# Patient Record
Sex: MALE | Race: WHITE
[De-identification: names, ages, dates, MRNs, and addresses within clinical notes are randomized per-mention and may not be internally consistent; named-entity substitution may affect disease eponyms.]

---

## 2019-01-06 ENCOUNTER — HOSPITAL ENCOUNTER (EMERGENCY)
Dept: HOSPITAL 57 - BURERS | Age: 69
Discharge: ANOTHER HOSPITAL | End: 2019-01-06
Payer: COMMERCIAL

## 2019-01-06 DIAGNOSIS — I10: ICD-10-CM

## 2019-01-06 DIAGNOSIS — Z79.4: ICD-10-CM

## 2019-01-06 DIAGNOSIS — I25.2: ICD-10-CM

## 2019-01-06 DIAGNOSIS — A41.9: Primary | ICD-10-CM

## 2019-01-06 DIAGNOSIS — N19: ICD-10-CM

## 2019-01-06 DIAGNOSIS — Z79.82: ICD-10-CM

## 2019-01-06 DIAGNOSIS — Z79.899: ICD-10-CM

## 2019-01-06 DIAGNOSIS — E78.1: ICD-10-CM

## 2019-01-06 LAB
ALBUMIN SERPL BCG-MCNC: 3.6 G/DL (ref 3.4–4.8)
ALP SERPL-CCNC: 85 U/L (ref 40–150)
ALT SERPL W P-5'-P-CCNC: 36 U/L (ref 8–55)
ANION GAP SERPL CALC-SCNC: 29 MMOL/L (ref 10–20)
AST SERPL-CCNC: 44 U/L (ref 5–34)
BILIRUB SERPL-MCNC: 1.2 MG/DL (ref 0.2–1.2)
BUN SERPL-MCNC: 44 MG/DL (ref 8.4–25.7)
CALCIUM SERPL-MCNC: 9.8 MG/DL (ref 7.8–10.44)
CHLORIDE SERPL-SCNC: 84 MMOL/L (ref 98–107)
CK MB SERPL-MCNC: 2.2 NG/ML (ref 0–6.6)
CO2 SERPL-SCNC: 15 MMOL/L (ref 23–31)
CREAT CL PREDICTED SERPL C-G-VRATE: 0 ML/MIN (ref 70–130)
DOHLE BOD BLD QL SMEAR: SLIGHT
GLOBULIN SER CALC-MCNC: 3.8 G/DL (ref 2.4–3.5)
GLUCOSE SERPL-MCNC: 538 MG/DL (ref 80–115)
HGB BLD-MCNC: 15.1 G/DL (ref 14–18)
MCH RBC QN AUTO: 30.5 PG (ref 27–31)
MCV RBC AUTO: 90.6 FL (ref 78–98)
MDIFF COMPLETE?: YES
PLATELET # BLD AUTO: 114 THOU/UL (ref 130–400)
POTASSIUM SERPL-SCNC: 3.6 MMOL/L (ref 3.5–5.1)
RBC # BLD AUTO: 4.93 MILL/UL (ref 4.7–6.1)
SODIUM SERPL-SCNC: 124 MMOL/L (ref 136–145)
TOXIC GRANULES BLD QL SMEAR: SLIGHT
WBC # BLD AUTO: 12.5 THOU/UL (ref 4.8–10.8)

## 2019-01-06 PROCEDURE — 93005 ELECTROCARDIOGRAM TRACING: CPT

## 2019-01-06 PROCEDURE — 87070 CULTURE OTHR SPECIMN AEROBIC: CPT

## 2019-01-06 PROCEDURE — 87186 SC STD MICRODIL/AGAR DIL: CPT

## 2019-01-06 PROCEDURE — 87077 CULTURE AEROBIC IDENTIFY: CPT

## 2019-01-06 PROCEDURE — 36416 COLLJ CAPILLARY BLOOD SPEC: CPT

## 2019-01-06 PROCEDURE — 84484 ASSAY OF TROPONIN QUANT: CPT

## 2019-01-06 PROCEDURE — 96375 TX/PRO/DX INJ NEW DRUG ADDON: CPT

## 2019-01-06 PROCEDURE — 83605 ASSAY OF LACTIC ACID: CPT

## 2019-01-06 PROCEDURE — 87040 BLOOD CULTURE FOR BACTERIA: CPT

## 2019-01-06 PROCEDURE — 82553 CREATINE MB FRACTION: CPT

## 2019-01-06 PROCEDURE — 96374 THER/PROPH/DIAG INJ IV PUSH: CPT

## 2019-01-06 PROCEDURE — 87205 SMEAR GRAM STAIN: CPT

## 2019-01-06 PROCEDURE — 80053 COMPREHEN METABOLIC PANEL: CPT

## 2019-01-06 PROCEDURE — 87149 DNA/RNA DIRECT PROBE: CPT

## 2019-01-06 PROCEDURE — 10060 I&D ABSCESS SIMPLE/SINGLE: CPT

## 2019-01-06 PROCEDURE — 71045 X-RAY EXAM CHEST 1 VIEW: CPT

## 2019-01-06 PROCEDURE — 83880 ASSAY OF NATRIURETIC PEPTIDE: CPT

## 2019-01-06 PROCEDURE — 85025 COMPLETE CBC W/AUTO DIFF WBC: CPT

## 2019-01-06 NOTE — RAD
AP PORTABLE CHEST:

 

01/06/2019

 

2214 HOURS

 

COMPARISON:

 No prior films available for comparison.

 

FINDINGS:

The heart is mildly enlarged, but there is no congestive change or pleural effusion.  The lung are cl
ear.  The mediastinum shows no widening or shift.

 

IMPRESSION:

Slight cardiomegaly.

 

POS: HOME

## 2019-01-07 ENCOUNTER — HOSPITAL ENCOUNTER (INPATIENT)
Dept: HOSPITAL 92 - ERS | Age: 69
LOS: 25 days | Discharge: NURSING, REHAB OR LONG-TERM CARE | DRG: 3 | End: 2019-02-01
Attending: FAMILY MEDICINE | Admitting: FAMILY MEDICINE
Payer: COMMERCIAL

## 2019-01-07 VITALS — BODY MASS INDEX: 33.7 KG/M2

## 2019-01-07 DIAGNOSIS — E11.51: ICD-10-CM

## 2019-01-07 DIAGNOSIS — I47.1: ICD-10-CM

## 2019-01-07 DIAGNOSIS — E87.6: ICD-10-CM

## 2019-01-07 DIAGNOSIS — R65.21: ICD-10-CM

## 2019-01-07 DIAGNOSIS — J44.9: ICD-10-CM

## 2019-01-07 DIAGNOSIS — D64.9: ICD-10-CM

## 2019-01-07 DIAGNOSIS — I13.0: ICD-10-CM

## 2019-01-07 DIAGNOSIS — Z88.5: ICD-10-CM

## 2019-01-07 DIAGNOSIS — E66.01: ICD-10-CM

## 2019-01-07 DIAGNOSIS — Z79.899: ICD-10-CM

## 2019-01-07 DIAGNOSIS — Z95.5: ICD-10-CM

## 2019-01-07 DIAGNOSIS — I50.31: ICD-10-CM

## 2019-01-07 DIAGNOSIS — G92: ICD-10-CM

## 2019-01-07 DIAGNOSIS — I48.91: ICD-10-CM

## 2019-01-07 DIAGNOSIS — E11.10: ICD-10-CM

## 2019-01-07 DIAGNOSIS — L03.116: ICD-10-CM

## 2019-01-07 DIAGNOSIS — E78.5: ICD-10-CM

## 2019-01-07 DIAGNOSIS — J96.01: ICD-10-CM

## 2019-01-07 DIAGNOSIS — E87.5: ICD-10-CM

## 2019-01-07 DIAGNOSIS — A40.9: Primary | ICD-10-CM

## 2019-01-07 DIAGNOSIS — F17.210: ICD-10-CM

## 2019-01-07 DIAGNOSIS — E87.2: ICD-10-CM

## 2019-01-07 DIAGNOSIS — I25.2: ICD-10-CM

## 2019-01-07 DIAGNOSIS — E87.1: ICD-10-CM

## 2019-01-07 DIAGNOSIS — R53.81: ICD-10-CM

## 2019-01-07 DIAGNOSIS — N17.9: ICD-10-CM

## 2019-01-07 DIAGNOSIS — I25.10: ICD-10-CM

## 2019-01-07 DIAGNOSIS — N18.9: ICD-10-CM

## 2019-01-07 DIAGNOSIS — E11.22: ICD-10-CM

## 2019-01-07 DIAGNOSIS — L02.416: ICD-10-CM

## 2019-01-07 DIAGNOSIS — E27.40: ICD-10-CM

## 2019-01-07 DIAGNOSIS — N17.0: ICD-10-CM

## 2019-01-07 DIAGNOSIS — G47.33: ICD-10-CM

## 2019-01-07 LAB
ALBUMIN SERPL BCG-MCNC: 2.7 G/DL (ref 3.4–4.8)
ALP SERPL-CCNC: 66 U/L (ref 40–150)
ALT SERPL W P-5'-P-CCNC: 26 U/L (ref 8–55)
ANALYZER IN CARDIO: (no result)
ANION GAP SERPL CALC-SCNC: 17 MMOL/L (ref 10–20)
ANION GAP SERPL CALC-SCNC: 17 MMOL/L (ref 10–20)
ANION GAP SERPL CALC-SCNC: 21 MMOL/L (ref 10–20)
ANION GAP SERPL CALC-SCNC: 22 MMOL/L (ref 10–20)
ANION GAP SERPL CALC-SCNC: 23 MMOL/L (ref 10–20)
AST SERPL-CCNC: 37 U/L (ref 5–34)
BASE EXCESS STD BLDA CALC-SCNC: -11.5 MEQ/L
BILIRUB SERPL-MCNC: 0.8 MG/DL (ref 0.2–1.2)
BUN SERPL-MCNC: 45 MG/DL (ref 8.4–25.7)
BUN SERPL-MCNC: 46 MG/DL (ref 8.4–25.7)
BUN SERPL-MCNC: 47 MG/DL (ref 8.4–25.7)
BUN SERPL-MCNC: 47 MG/DL (ref 8.4–25.7)
BUN SERPL-MCNC: 50 MG/DL (ref 8.4–25.7)
CA-I BLDA-SCNC: 1.04 MMOL/L (ref 1.12–1.3)
CALCIUM SERPL-MCNC: 7.4 MG/DL (ref 7.8–10.44)
CALCIUM SERPL-MCNC: 7.4 MG/DL (ref 7.8–10.44)
CALCIUM SERPL-MCNC: 7.9 MG/DL (ref 7.8–10.44)
CHLORIDE SERPL-SCNC: 94 MMOL/L (ref 98–107)
CHLORIDE SERPL-SCNC: 94 MMOL/L (ref 98–107)
CHLORIDE SERPL-SCNC: 95 MMOL/L (ref 98–107)
CHLORIDE SERPL-SCNC: 98 MMOL/L (ref 98–107)
CHLORIDE SERPL-SCNC: 99 MMOL/L (ref 98–107)
CO2 SERPL-SCNC: 14 MMOL/L (ref 23–31)
CO2 SERPL-SCNC: 14 MMOL/L (ref 23–31)
CO2 SERPL-SCNC: 15 MMOL/L (ref 23–31)
CO2 SERPL-SCNC: 16 MMOL/L (ref 23–31)
CO2 SERPL-SCNC: 17 MMOL/L (ref 23–31)
CREAT CL PREDICTED SERPL C-G-VRATE: 0 ML/MIN (ref 70–130)
CREAT CL PREDICTED SERPL C-G-VRATE: 0 ML/MIN (ref 70–130)
CREAT CL PREDICTED SERPL C-G-VRATE: 32 ML/MIN (ref 70–130)
CREAT CL PREDICTED SERPL C-G-VRATE: 33 ML/MIN (ref 70–130)
CREAT CL PREDICTED SERPL C-G-VRATE: 36 ML/MIN (ref 70–130)
GLOBULIN SER CALC-MCNC: 3 G/DL (ref 2.4–3.5)
GLUCOSE SERPL-MCNC: 167 MG/DL (ref 80–115)
GLUCOSE SERPL-MCNC: 207 MG/DL (ref 80–115)
GLUCOSE SERPL-MCNC: 384 MG/DL (ref 80–115)
GLUCOSE SERPL-MCNC: 430 MG/DL (ref 80–115)
GLUCOSE SERPL-MCNC: 477 MG/DL (ref 80–115)
HCO3 BLDA-SCNC: 14.6 MEQ/L (ref 22–28)
HCT VFR BLDA CALC: 40 % (ref 42–52)
HGB BLD-MCNC: 13.1 G/DL (ref 14–18)
HGB BLDA-MCNC: 13.7 G/DL (ref 14–18)
MCH RBC QN AUTO: 32.2 PG (ref 27–31)
MCV RBC AUTO: 92.2 FL (ref 78–98)
MDIFF COMPLETE?: YES
PCO2 BLDA: 33.8 MMHG (ref 35–45)
PH BLDA: 7.25 [PH] (ref 7.35–7.45)
PLATELET # BLD AUTO: 93 THOU/UL (ref 130–400)
PO2 BLDA: 92.9 MMHG (ref 80–?)
POTASSIUM BLD-SCNC: 3.51 MMOL/L (ref 3.7–5.3)
POTASSIUM SERPL-SCNC: 3.6 MMOL/L (ref 3.5–5.1)
POTASSIUM SERPL-SCNC: 3.8 MMOL/L (ref 3.5–5.1)
POTASSIUM SERPL-SCNC: 4 MMOL/L (ref 3.5–5.1)
RBC # BLD AUTO: 4.07 MILL/UL (ref 4.7–6.1)
SODIUM SERPL-SCNC: 126 MMOL/L (ref 136–145)
SODIUM SERPL-SCNC: 127 MMOL/L (ref 136–145)
SODIUM SERPL-SCNC: 127 MMOL/L (ref 136–145)
SODIUM SERPL-SCNC: 128 MMOL/L (ref 136–145)
SODIUM SERPL-SCNC: 128 MMOL/L (ref 136–145)
SPECIMEN DRAWN FROM PATIENT: (no result)
TOXIC GRANULES BLD QL SMEAR: SLIGHT
WBC # BLD AUTO: 10.9 THOU/UL (ref 4.8–10.8)

## 2019-01-07 PROCEDURE — 85007 BL SMEAR W/DIFF WBC COUNT: CPT

## 2019-01-07 PROCEDURE — C1887 CATHETER, GUIDING: HCPCS

## 2019-01-07 PROCEDURE — 74176 CT ABD & PELVIS W/O CONTRAST: CPT

## 2019-01-07 PROCEDURE — 71045 X-RAY EXAM CHEST 1 VIEW: CPT

## 2019-01-07 PROCEDURE — C1725 CATH, TRANSLUMIN NON-LASER: HCPCS

## 2019-01-07 PROCEDURE — 86850 RBC ANTIBODY SCREEN: CPT

## 2019-01-07 PROCEDURE — 93010 ELECTROCARDIOGRAM REPORT: CPT

## 2019-01-07 PROCEDURE — 36556 INSERT NON-TUNNEL CV CATH: CPT

## 2019-01-07 PROCEDURE — P9016 RBC LEUKOCYTES REDUCED: HCPCS

## 2019-01-07 PROCEDURE — 5A1955Z RESPIRATORY VENTILATION, GREATER THAN 96 CONSECUTIVE HOURS: ICD-10-PCS | Performed by: SURGERY

## 2019-01-07 PROCEDURE — 80061 LIPID PANEL: CPT

## 2019-01-07 PROCEDURE — 85520 HEPARIN ASSAY: CPT

## 2019-01-07 PROCEDURE — C1769 GUIDE WIRE: HCPCS

## 2019-01-07 PROCEDURE — P9045 ALBUMIN (HUMAN), 5%, 250 ML: HCPCS

## 2019-01-07 PROCEDURE — 82010 KETONE BODYS QUAN: CPT

## 2019-01-07 PROCEDURE — 94003 VENT MGMT INPAT SUBQ DAY: CPT

## 2019-01-07 PROCEDURE — 90935 HEMODIALYSIS ONE EVALUATION: CPT

## 2019-01-07 PROCEDURE — 93454 CORONARY ARTERY ANGIO S&I: CPT

## 2019-01-07 PROCEDURE — 80076 HEPATIC FUNCTION PANEL: CPT

## 2019-01-07 PROCEDURE — 87205 SMEAR GRAM STAIN: CPT

## 2019-01-07 PROCEDURE — 96374 THER/PROPH/DIAG INJ IV PUSH: CPT

## 2019-01-07 PROCEDURE — 36415 COLL VENOUS BLD VENIPUNCTURE: CPT

## 2019-01-07 PROCEDURE — 94640 AIRWAY INHALATION TREATMENT: CPT

## 2019-01-07 PROCEDURE — C9113 INJ PANTOPRAZOLE SODIUM, VIA: HCPCS

## 2019-01-07 PROCEDURE — 80053 COMPREHEN METABOLIC PANEL: CPT

## 2019-01-07 PROCEDURE — 36430 TRANSFUSION BLD/BLD COMPNT: CPT

## 2019-01-07 PROCEDURE — 85027 COMPLETE CBC AUTOMATED: CPT

## 2019-01-07 PROCEDURE — 93798 PHYS/QHP OP CAR RHAB W/ECG: CPT

## 2019-01-07 PROCEDURE — 87086 URINE CULTURE/COLONY COUNT: CPT

## 2019-01-07 PROCEDURE — 96375 TX/PRO/DX INJ NEW DRUG ADDON: CPT

## 2019-01-07 PROCEDURE — 92928 PRQ TCAT PLMT NTRAC ST 1 LES: CPT

## 2019-01-07 PROCEDURE — 87040 BLOOD CULTURE FOR BACTERIA: CPT

## 2019-01-07 PROCEDURE — 86704 HEP B CORE ANTIBODY TOTAL: CPT

## 2019-01-07 PROCEDURE — 83605 ASSAY OF LACTIC ACID: CPT

## 2019-01-07 PROCEDURE — 84443 ASSAY THYROID STIM HORMONE: CPT

## 2019-01-07 PROCEDURE — 93970 EXTREMITY STUDY: CPT

## 2019-01-07 PROCEDURE — 87149 DNA/RNA DIRECT PROBE: CPT

## 2019-01-07 PROCEDURE — G0257 UNSCHED DIALYSIS ESRD PT HOS: HCPCS

## 2019-01-07 PROCEDURE — 5A1D70Z PERFORMANCE OF URINARY FILTRATION, INTERMITTENT, LESS THAN 6 HOURS PER DAY: ICD-10-PCS | Performed by: SURGERY

## 2019-01-07 PROCEDURE — 99153 MOD SED SAME PHYS/QHP EA: CPT

## 2019-01-07 PROCEDURE — 84300 ASSAY OF URINE SODIUM: CPT

## 2019-01-07 PROCEDURE — C1876 STENT, NON-COA/NON-COV W/DEL: HCPCS

## 2019-01-07 PROCEDURE — 82533 TOTAL CORTISOL: CPT

## 2019-01-07 PROCEDURE — 76770 US EXAM ABDO BACK WALL COMP: CPT

## 2019-01-07 PROCEDURE — 80162 ASSAY OF DIGOXIN TOTAL: CPT

## 2019-01-07 PROCEDURE — 80202 ASSAY OF VANCOMYCIN: CPT

## 2019-01-07 PROCEDURE — 85347 COAGULATION TIME ACTIVATED: CPT

## 2019-01-07 PROCEDURE — 83735 ASSAY OF MAGNESIUM: CPT

## 2019-01-07 PROCEDURE — 94660 CPAP INITIATION&MGMT: CPT

## 2019-01-07 PROCEDURE — G0365 VESSEL MAPPING HEMO ACCESS: HCPCS

## 2019-01-07 PROCEDURE — 86803 HEPATITIS C AB TEST: CPT

## 2019-01-07 PROCEDURE — 84484 ASSAY OF TROPONIN QUANT: CPT

## 2019-01-07 PROCEDURE — 86706 HEP B SURFACE ANTIBODY: CPT

## 2019-01-07 PROCEDURE — 93306 TTE W/DOPPLER COMPLETE: CPT

## 2019-01-07 PROCEDURE — 81001 URINALYSIS AUTO W/SCOPE: CPT

## 2019-01-07 PROCEDURE — 80048 BASIC METABOLIC PNL TOTAL CA: CPT

## 2019-01-07 PROCEDURE — 87077 CULTURE AEROBIC IDENTIFY: CPT

## 2019-01-07 PROCEDURE — 86900 BLOOD TYPING SEROLOGIC ABO: CPT

## 2019-01-07 PROCEDURE — P9047 ALBUMIN (HUMAN), 25%, 50ML: HCPCS

## 2019-01-07 PROCEDURE — 85025 COMPLETE CBC W/AUTO DIFF WBC: CPT

## 2019-01-07 PROCEDURE — 87070 CULTURE OTHR SPECIMN AEROBIC: CPT

## 2019-01-07 PROCEDURE — 82570 ASSAY OF URINE CREATININE: CPT

## 2019-01-07 PROCEDURE — 82805 BLOOD GASES W/O2 SATURATION: CPT

## 2019-01-07 PROCEDURE — 94002 VENT MGMT INPAT INIT DAY: CPT

## 2019-01-07 PROCEDURE — 36416 COLLJ CAPILLARY BLOOD SPEC: CPT

## 2019-01-07 PROCEDURE — C1752 CATH,HEMODIALYSIS,SHORT-TERM: HCPCS

## 2019-01-07 PROCEDURE — 83036 HEMOGLOBIN GLYCOSYLATED A1C: CPT

## 2019-01-07 PROCEDURE — 87340 HEPATITIS B SURFACE AG IA: CPT

## 2019-01-07 PROCEDURE — 86901 BLOOD TYPING SEROLOGIC RH(D): CPT

## 2019-01-07 PROCEDURE — 96361 HYDRATE IV INFUSION ADD-ON: CPT

## 2019-01-07 PROCEDURE — 99152 MOD SED SAME PHYS/QHP 5/>YRS: CPT

## 2019-01-07 PROCEDURE — 93005 ELECTROCARDIOGRAM TRACING: CPT

## 2019-01-07 RX ADMIN — POTASSIUM CHLORIDE, DEXTROSE MONOHYDRATE AND SODIUM CHLORIDE PRN MLS: 150; 5; 450 INJECTION, SOLUTION INTRAVENOUS at 11:24

## 2019-01-07 RX ADMIN — INSULIN HUMAN SCH MLS: 100 INJECTION, SOLUTION PARENTERAL at 17:06

## 2019-01-07 RX ADMIN — Medication SCH ML: at 08:27

## 2019-01-07 RX ADMIN — POTASSIUM CHLORIDE, DEXTROSE MONOHYDRATE AND SODIUM CHLORIDE PRN MLS: 150; 5; 450 INJECTION, SOLUTION INTRAVENOUS at 07:20

## 2019-01-07 RX ADMIN — Medication SCH ML: at 20:23

## 2019-01-07 RX ADMIN — ASPIRIN 81 MG CHEWABLE TABLET SCH MG: 81 TABLET CHEWABLE at 08:27

## 2019-01-07 RX ADMIN — POTASSIUM CHLORIDE, DEXTROSE MONOHYDRATE AND SODIUM CHLORIDE PRN MLS: 150; 5; 450 INJECTION, SOLUTION INTRAVENOUS at 21:45

## 2019-01-07 RX ADMIN — POTASSIUM CHLORIDE, DEXTROSE MONOHYDRATE AND SODIUM CHLORIDE PRN MLS: 150; 5; 450 INJECTION, SOLUTION INTRAVENOUS at 16:13

## 2019-01-07 NOTE — CON
DATE OF CONSULTATION:  



HISTORY OF PRESENT ILLNESS:  Danial Bojorquez is a morbidly obese 68-year-old

gentleman from [place], [place].  They states he is working in [place] when he

presented with several day history of swelling of his left groin.  He went to

[place] where diagnosis of cellulitis and sepsis was made.  His lactic acid was

elevated at 7.7.  His initial blood pressure was 98/61, pulse 135, O2 saturation is

98% on room air.  Apparently, there was a swelling in the groin, which was lanced.

He was admitted to the ICU with hypotension, uncontrolled diabetes, and sepsis

syndrome. 



He has smoked a pack a day most of his life.  Denies any prior history of TB,

pneumonia, or bronchial asthma.  According to his son, who is present at the

bedside, he clearly snores. 



PAST MEDICAL HISTORY:  Coronary artery disease, myocardial infarction, status post

multiple stents at least 4, three last year in 2017, hypertension, diabetes, and

high triglyceride. 



PREVIOUS SURGERIES:  Including multiple stents, shoulder surgery, and knee surgery.



SOCIAL HISTORY:  Alcohol, none.  Tobacco, as noted.  He is apparently does some kind

of . 



MEDICATIONS:  From home includes;

1. Metformin 500 two tablets twice a day.

2. Metoprolol one tablet a day.

3. Losartan insulin 35 units twice a day.

4. Aspirin.

5. Amlodipine. 

He is now started on vancomycin and Zosyn.



REVIEW OF SYSTEMS:  Otherwise, 10-point negative.



PHYSICAL EXAMINATION:

GENERAL:  Obese gentleman. 

VITAL SIGNS:  O2 saturations are 99% on room air, pulse is 91, blood pressure is

140/72, respiratory rate 18. 

CHEST:  Decreased breath sounds.  No wheezing. 

CARDIAC:  Normal S1, S2.  No gallops. 

ABDOMEN:  Obese. 

EXTREMITIES:  His left leg is swollen.  There is evidence of edema.



DIAGNOSTIC DATA:  Creatinine 3.62.  Sodium is 128.  His blood sugar is 257 in

insulin drip.  His calcium is 7.9, and his lactic acid is still elevated at 6.9,

magnesium 0.7.  Ultrasound has been ordered. 



Chest x-ray was otherwise unremarkable.  He had a CT done of his abdomen and pelvis,

which showed cellulitis above his proximal left thigh inguinal area. 



IMPRESSION:  

1. Cellulitis, left leg.

2. Uncontrolled diabetes.

3. Renal failure.

4. Hypertension.

5. Coronary artery disease.

6. Sleep apnea.

7. Chronic obstructive pulmonary disease.



PLAN:  I agree with present antibiotic coverage.  Ultrasound of leg is being

ordered.  Initiate some neb treatments.  Diet, PT supportive Care.  We will follow

while in the ICU. 



Consultation note, 70 minutes, 50% direct patient care.







Job ID:  [id number]

## 2019-01-07 NOTE — EKG
Test Reason : 

Blood Pressure : ***/*** mmHG

Vent. Rate : 102 BPM     Atrial Rate : 102 BPM

   P-R Int : 096 ms          QRS Dur : 090 ms

    QT Int : 334 ms       P-R-T Axes : 031 027 011 degrees

   QTc Int : 435 ms

 

Sinus tachycardia with short MI

ns T wave changes

When compared with ECG of 07-JAN-2019 00:17, (Unconfirmed)

Sinus rhythm has replaced Atrial fibrillation

Nonspecific T wave abnormality, improved in Lateral leads

Confirmed by DR. KEEGAN NAVARRO ([id number]) on 1/7/2019 5:14:59 PM

 

Referred By:  MALLORIE           Confirmed By:DR. KEEGAN NAVARRO

## 2019-01-07 NOTE — ULT
ULTRASOUND WITH DOPPLER DUPLEX VENOUS LOWER EXTREMITY LEFT:

 

CPT: 44669

ICD-10-PCS: B54D

 

INDICATION:

Left lower extremity edema. 

 

TECHNIQUE:

Color flow Doppler, spectral waveform analysis of pulsed Doppler, and gray-scale imaging with radha
arcelia and augmentation, were used to evaluate the left common femoral, femoral, popliteal, posterior t
ibial, and superficial femoral, veins; and the proximal portions of the profunda femoral and greater 
saphenous, veins.

 

FINDINGS:

There is appropriate compressibility and flow within the imaged dep vein system of the visualized lef
t lower extremity. Incidental note of prominent sized left inguinal regional lymph node. There is sof
t tissue edema. 

 

IMPRESSION:

No deep venous thrombosis of the imaged left lower extremity. 

 

 

 

POS: [facility]

## 2019-01-07 NOTE — RAD
CHEST 1 VIEW:

 

HISTORY: 

Chest pain.

 

COMPARISON: 

Chest radiograph from prior day.

 

FINDINGS: 

New right IJ central venous catheter is in place with tip at the mid SVC.  No pneumothorax.  The rosaline
venice of the findings are unchanged.

 

IMPRESSION: 

Uncomplicated placement right internal jugular central venous catheter.

 

POS: CET

## 2019-01-07 NOTE — CT
CT ABDOMEN AND PELVIS NONCONTARST:

 

INDICATION: 

Cellulitis, sepsis.  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

 

FINDINGS: 

There is abnormal increased density of the ventral and left lateral subcutaneous tissues of the proxi
mal left thigh with overlying skin thickening.  No soft tissue gas is seen.  There is increased subcu
taneous density of the scrotal soft tissues, incompletely evaluated.  There is increased subcutaneous
 density of the scrotal soft tissues, incompletely evaluated.  No soft tissue gas in this region.  Th
ere is hepatic steatosis.  The gallbladder is contracted.  There is bilateral mild perinephric fat st
randing.  Prostate calcification is seen. There is mild distention of the urinary bladder with mild w
all prominence primarily to the incompletely distended state.  Visualized lung bases reveal no consol
idation or significant effusion.  There is diffuse vascular calcification.  No free air.  Metallic cl
ips are seen at the right lower quadrant.  No acute osseous abnormality.  Mild enlargement of left in
guinal lymph nodes may be reactive.

 

IMPRESSION: 

1.  Findings indicating cellulitis of the proximal left thigh and inguinal region with probable assoc
iated left inguinal reactive adenopathy.  No soft tissue gas is seen.

 

2.  There is mild increased density of the soft tissues of the scrotum without soft tissue gas.  If h
ere is concern, consider followup with scrotal ultrasound as indicated.

 

3.  Additional details are as described above.

 

POS: [facility]

## 2019-01-07 NOTE — PDOC.PN
- Subjective


Encounter Start Date: 01/07/19


Encounter Start Time: 09:31


Subjective: some sob, no chest pain, fever





- Objective


Resuscitation Status - Order Detail:





01/07/19 04:33


Resuscitation Status Routine 


   Resuscitation Status: FULL: Full Resuscitation








MAR Reviewed: Yes


Vital Signs & Weight: 


 Vital Signs (12 hours)











  Temp Pulse Ox


 


 01/07/19 07:00  97.9 F 


 


 01/07/19 05:00  98.1 F 


 


 01/07/19 04:00   95


 


 01/07/19 03:40  98.1 F 








 Weight











Weight                         249 lb 1.957 oz











 Most Recent Monitor Data











Heart Rate from ECG            105


 


NIBP                           124/62


 


NIBP BP-Mean                   82


 


Respiration from ECG           33


 


SpO2                           96














I&O: 


 











 01/06/19 01/07/19 01/08/19





 06:59 06:59 06:59


 


Intake Total  1100.6 480


 


Output Total  250 20


 


Balance  850.6 460











Result Diagrams: 


 01/07/19 00:18





 01/07/19 04:27


Additional Labs: 


 Accuchecks











  01/07/19 01/07/19 01/07/19





  09:12 08:27 07:15


 


POC Glucose  205 H  215 H  208 H














  01/07/19 01/07/19 01/07/19





  06:13 05:22 04:30


 


POC Glucose  257 H  306 H  321 H














  01/07/19 01/07/19





  03:35 02:52


 


POC Glucose  425 H  376 H














Phys Exam





- Physical Examination


Neck: no JVD


Respiratory: clear to auscultation bilateral


Cardiovascular: RRR, no significant murmur


Gastrointestinal: soft, non-tender, positive bowel sounds


tense edema L leg to groin, erythema, 2 cm open lesion L medial upper thigh





Dx/Plan


(1) Septic shock


Code(s): A41.9 - SEPSIS, UNSPECIFIED ORGANISM; R65.21 - SEVERE SEPSIS WITH 

SEPTIC SHOCK   Status: Acute   





(2) Acute renal failure


Status: Acute   





(3) Abscess of left thigh


Code(s): L02.416 - CUTANEOUS ABSCESS OF LEFT LOWER LIMB   Status: Acute   





(4) CAD (coronary artery disease)


Code(s): I25.10 - ATHSCL HEART DISEASE OF NATIVE CORONARY ARTERY W/O ANG PCTRS 

  Status: Acute   


Qualifiers: 


   Coronary Disease-Associated Artery/Lesion type: native artery   Native vs. 

transplanted heart: native heart   Associated angina: without angina   

Qualified Code(s): I25.10 - Atherosclerotic heart disease of native coronary 

artery without angina pectoris   





(5) HTN (hypertension)


Code(s): I10 - ESSENTIAL (PRIMARY) HYPERTENSION   Status: Acute   


Qualifiers: 


   Hypertension type: essential hypertension   Qualified Code(s): I10 - 

Essential (primary) hypertension   





(6) DM type 2 (diabetes mellitus, type 2)


Status: Acute   


Qualifiers: 


   Diabetes mellitus long term insulin use: with long term use   Diabetes 

mellitus complication status: with kidney complications 





- Plan


cont iv fluids, iv insulin, serial accu/bmp


-: cont iv antibx, blood C&S pending


-: cont levophed as needed for BP control


-: serial exams





* .

## 2019-01-07 NOTE — CON
DATE OF CONSULTATION:  01/07/2019



HISTORY OF PRESENT ILLNESS:  Danial Bojorquez is a 68-year-old white male, 
transferred

from [place] with sepsis and atrial fibrillation.  He has a previous cardiac

history with history of myocardial infarction, as well as previous stents 
placed x5.

 Last time stents were placed was in April 2017.  Currently, he is not on dual

antiplatelet therapy and when asked about Plavix, Brilinta or Effient, he is not

familiar with any of those medications. 



He has been noticing increasing edema and redness of his left leg.  He began to 
have

increasing weakness, shortness of breath and with the left groin pain, went to 
the

emergency room in [place].  He underwent incision and drainage of a small 
abscess

of the left groin.  He was transferred here for further care.  He is in atrial

fibrillation with fast ventricular response, was given digoxin 0.25 IV and

eventually has converted to sinus rhythm.  He denied any palpitations or ever 
having

A type of rhythm problems.  He denied any chest discomfort or palpitations, but 
did have some

shortness of breath. 



PAST MEDICAL HISTORY:  

1. Coronary artery disease status post multiple stent placements since 1998.

2. Diabetes.

3. Hypertension.

4. Hyperlipidemia.

5. Smoker and uses smokeless tobacco.

6. Peripheral neuropathy.



OPERATIONS:  Cardiac stent placement, left shoulder repair, knee surgery.



MEDICATIONS:  

1. Amlodipine 2.5 mg daily.

2. Enteric-coated aspirin 81 daily.

3. Fenofibrate 160 daily.

4. Omega-3 of 1000 mg b.i.d.

5. NovoLog 70/30 of 35 units b.i.d.

6. Losartan/hydrochlorothiazide 100/12.5 daily.

7. Metformin 1000 mg b.i.d.

8. Metoprolol 100 daily.

9. Multivitamin daily.



ALLERGIES:  MORPHINE.



SOCIAL HISTORY:  He continues to smoke one-half to 1 pack per day and dips 
snuff.

He is a .  He occasionally drinks alcohol. 



REVIEW OF SYSTEMS:  A 12-point review of systems is otherwise unremarkable.



PHYSICAL EXAMINATION:

VITAL SIGNS:  Blood pressure 85/69, pulse 86. 

HEENT:  PERRL. 

NECK:  Supple. 

CHEST:  Clear. 

CARDIAC:  S1 and S2 normal without any S3, S4, or murmurs. 

ABDOMEN:  Obese.  Normal bowel sounds.  No tenderness. 

EXTREMITIES:  Revealed 3 to 4+ tense edema of the left leg, trace edema of the 
right

leg. 

NEUROLOGICAL:  Grossly intact. 

SKIN:  Warm and dry.



LABORATORY DATA:  EKG in [place] revealed atrial fibrillation with fast 
ventricular

response of 140 per minute, nonspecific ST and T-wave changes.  Another EKG

here shows sinus tachycardia with rate of 102 per minute.  White count 10,900;

hemoglobin 13.1; hematocrit 37.5; platelets 93,000.  PH 7.25, pCO2 of 33.8, PO2 
of

92.9.  Sodium 128, potassium 4.0, chloride 98, carbon dioxide 17, BUN 50, 
creatinine

3.51.  Creatinine on admission was 4.39.  Troponin I 0.054. 



IMPRESSION:  

1. Septic shock with abscess in the left groin and hypotension.

2. Atrial fibrillation with fast ventricular response.  He ultimately slowed and

converted to sinus rhythm. 

3. Diabetic ketoacidosis.

4. Left groin abscess, status post incision and drainage.

5. Diabetes.

6. Smoker.

7. Smokeless tobacco use.

8. Acute kidney injury.

9. Hypercholesterolemia.

10.Coronary artery disease, history of stent placement and MI.



PLAN:  The patient at the present time has converted to sinus rhythm.

Echocardiogram will be performed to assess left ventricular function.  The 
patient

will be maintained on his beta blocker once his blood pressure is adequate. 







Job ID:  [id number]



[facility]

## 2019-01-08 LAB
ANALYZER IN CARDIO: (no result)
ANION GAP SERPL CALC-SCNC: 17 MMOL/L (ref 10–20)
BACTERIA UR QL AUTO: (no result) HPF
BASE EXCESS STD BLDA CALC-SCNC: -12.5 MEQ/L
BUN SERPL-MCNC: 57 MG/DL (ref 8.4–25.7)
CA-I BLDA-SCNC: 0.99 MMOL/L (ref 1.12–1.3)
CALCIUM SERPL-MCNC: 7 MG/DL (ref 7.8–10.44)
CHLORIDE SERPL-SCNC: 99 MMOL/L (ref 98–107)
CO2 SERPL-SCNC: 14 MMOL/L (ref 23–31)
CREAT CL PREDICTED SERPL C-G-VRATE: 32 ML/MIN (ref 70–130)
CRYSTAL-AUWI FLAG: 0.1 (ref 0–15)
DOHLE BOD BLD QL SMEAR: SLIGHT
GLUCOSE SERPL-MCNC: 220 MG/DL (ref 80–115)
HCO3 BLDA-SCNC: 16.5 MEQ/L (ref 22–28)
HCT VFR BLDA CALC: 38 % (ref 42–52)
HEV IGM SER QL: 15.7 (ref 0–7.99)
HGB BLD-MCNC: 12 G/DL (ref 14–18)
HGB BLDA-MCNC: 13 G/DL (ref 14–18)
HYALINE CASTS #/AREA URNS LPF: (no result) LPF
MCH RBC QN AUTO: 31.6 PG (ref 27–31)
MCV RBC AUTO: 93 FL (ref 78–98)
MDIFF COMPLETE?: YES
PATHC CAST-AUWI FLAG: 1.01 (ref 0–2.49)
PCO2 BLDA: 49.8 MMHG (ref 35–45)
PH BLDA: 7.14 [PH] (ref 7.35–7.45)
PLATELET # BLD AUTO: 116 THOU/UL (ref 130–400)
PO2 BLDA: 93.7 MMHG (ref 80–?)
POTASSIUM BLD-SCNC: 4 MMOL/L (ref 3.7–5.3)
POTASSIUM SERPL-SCNC: 4.1 MMOL/L (ref 3.5–5.1)
PROT UR STRIP.AUTO-MCNC: 30 MG/DL
RBC # BLD AUTO: 3.81 MILL/UL (ref 4.7–6.1)
RBC UR QL AUTO: (no result) HPF (ref 0–3)
SODIUM SERPL-SCNC: 126 MMOL/L (ref 136–145)
SODIUM UR-SCNC: 30 MMOL/L
SP GR UR STRIP: 1.01 (ref 1–1.04)
SPECIMEN DRAWN FROM PATIENT: (no result)
SPERM-AUWI FLAG: 0 (ref 0–9.9)
TOXIC GRANULES BLD QL SMEAR: SLIGHT
VANCOMYCIN TROUGH SERPL-MCNC: 15.4 UG/ML
WBC # BLD AUTO: 12.2 THOU/UL (ref 4.8–10.8)
WBC UR QL AUTO: (no result) HPF (ref 0–3)
YEAST-AUWI FLAG: 157.5 (ref 0–25)

## 2019-01-08 PROCEDURE — 0J9C0ZX DRAINAGE OF PELVIC REGION SUBCUTANEOUS TISSUE AND FASCIA, OPEN APPROACH, DIAGNOSTIC: ICD-10-PCS | Performed by: SURGERY

## 2019-01-08 RX ADMIN — INSULIN HUMAN SCH MLS: 100 INJECTION, SOLUTION PARENTERAL at 18:11

## 2019-01-08 RX ADMIN — Medication SCH: at 10:17

## 2019-01-08 RX ADMIN — PENICILLIN G POTASSIUM SCH MLS: 5000000 POWDER, FOR SOLUTION INTRAMUSCULAR; INTRAPLEURAL; INTRATHECAL; INTRAVENOUS at 17:15

## 2019-01-08 RX ADMIN — POTASSIUM CHLORIDE, DEXTROSE MONOHYDRATE AND SODIUM CHLORIDE PRN MLS: 150; 5; 450 INJECTION, SOLUTION INTRAVENOUS at 22:48

## 2019-01-08 RX ADMIN — CLINDAMYCIN PHOSPHATE SCH MLS: 900 INJECTION, SOLUTION INTRAVENOUS at 09:11

## 2019-01-08 RX ADMIN — CLINDAMYCIN PHOSPHATE SCH MLS: 900 INJECTION, SOLUTION INTRAVENOUS at 16:55

## 2019-01-08 RX ADMIN — FENTANYL CITRATE SCH MLS: 50 INJECTION, SOLUTION INTRAMUSCULAR; INTRAVENOUS at 16:00

## 2019-01-08 RX ADMIN — Medication SCH MLS: at 22:49

## 2019-01-08 RX ADMIN — POTASSIUM CHLORIDE, DEXTROSE MONOHYDRATE AND SODIUM CHLORIDE PRN MLS: 150; 5; 450 INJECTION, SOLUTION INTRAVENOUS at 02:07

## 2019-01-08 RX ADMIN — PENICILLIN G POTASSIUM SCH MLS: 5000000 POWDER, FOR SOLUTION INTRAMUSCULAR; INTRAPLEURAL; INTRATHECAL; INTRAVENOUS at 22:51

## 2019-01-08 RX ADMIN — ASPIRIN 81 MG CHEWABLE TABLET SCH: 81 TABLET CHEWABLE at 10:00

## 2019-01-08 RX ADMIN — Medication SCH ML: at 19:47

## 2019-01-08 NOTE — CON
DATE OF CONSULTATION:  01/08/2019



REASON FOR CONSULTATION:  Sepsis with likely necrotizing fasciitis.



HISTORY OF PRESENT ILLNESS:  This is a 68-year-old who has a history of type 2

diabetes mellitus and coronary artery disease with prior revascularization and

hypertension, who developed an acute inflammatory process left lower extremity

associated with Streptococcus pyogenes bacteremia and now is in the intensive care

unit septic with multiorgan dysfunction.  Surgical evaluation has been done in the

groin to see if there were necrotizing features and that was not the case, but he

does have other findings described below in the lower extremity that are concerning

for necrotizing process.  The patient is intubated and sedated at this time.  He is

on pressors and broad-spectrum antimicrobial coverage. 



PAST MEDICAL HISTORY:  Coronary artery disease, type 2 diabetes, hypertension,

hyperlipidemia, peripheral neuropathy. 



PAST SURGICAL HISTORY:  Shoulder repair, knee arthroscopy.



MEDICATIONS:  

1. Norvasc.

2. Aspirin.

3. Fenofibrate.

4. Insulin.

5. Losartan.

6. Metformin.

7. Metoprolol.

8. He is also on inhalers.

9. He had been on vancomycin, Zosyn, and clindamycin, and has been switched to

clindamycin and penicillin G. 



PHYSICAL EXAMINATION:

VITAL SIGNS:  T-max 98.6, blood pressure 134/50, pulse 92, respirations 12, blood

pressure 150/63, and O2 saturation 96%. 

SKIN:  Exam shows multiple blisters with sort of a hemorrhagic fluid, some of them

have ruptured in the left leg.  The left thigh is swollen, but not as taut as it had

been before.  In the groin area, there is an area of surgical dissection which is

limited to the subcutaneous tissue. 

:  The patient has an indwelling Tom catheter.  Urinary output ranging from 200

to 410 per 24 hours.  The patient has a right IJ central venous catheter in place. 

HEENT:  His sclerae are white.  Pupils are miotic. 

NECK:  No jugular venous distention. 

LUNGS:  Symmetric air entry.  S1 and S2, regular rate. 

ABDOMEN:  Soft, mildly distended.  No bladder distention or organomegaly.  The left

leg has dopplerable dorsalis pedis pulses in the left side, the right side has 1+

cap refill is delayed.  There is blanching of the second toe left foot, multiple

blisters with a taut skin, some bluish discoloration of some segments of the

anterior left leg.  He has movements of the extremities, but does not follow

commands. 



LABORATORY DATA:  White cell count 10.9 and 12.2, hemoglobin 13, platelets 93 and

116,000, with 21 and now 48% bands, 2% metamyelocytes.  PH of 7.14, pCO2 49, PO2 93.

 Sodium 126, creatinine 3.53, AST 37, ALT 26, and alkaline phosphatase 66.

Bilirubin 0.8, albumin 2.7, globulin 3.0. 



Microbiology:  We have a Strep pyogenes from January 6.  Groin cultures have Strep

pyogenes from 2 sets of blood cultures. 



Echocardiogram with LV size mildly increased, EF 40% to 45%, mild valvular

regurgitation. 



Abdomen and pelvis CT with cellulitis, left thigh, inguinal region, reactive

lymphadenopathy. 



Chest x-ray with no infiltrates.



ASSESSMENT:  

1. Type 2 diabetes.

2. Inflammatory process with aggressive features, left lower extremity.

3. Multiorgan dysfunction.

4. Strep pyogenes bacteremia as well as Strep pyogenes, recovering from groin

blister. 



DISCUSSION:  The patient has an aggressive necrotizing infection of the left lower

extremity, ascending toward the groin with multiorgan dysfunction.  We will switch

him to pen G plus clindamycin.  Pen G adjustment for renal function.  Supportive

care.  The patient is at high risk for amputation.  Consideration could be given to

exploration of the soft tissues of the left leg to see if there is already evidence

of necrosis that would warrant further debridement.  The patient is at high risk for

amputation of the left lower extremity at a high level. 







Job ID:  [id number]

## 2019-01-08 NOTE — CON
DATE OF CONSULTATION:  



RENAL MEDICINE



HISTORY OF PRESENT ILLNESS:  Mr. Bojorquez is a 68-year-old white male, known history

of diabetes mellitus and initially presented with generalized malaise and shortness

of breath.  He also complained of left groin pain based on the history.  During the

initial evaluation, the patient was noted to be hyponatremic and had metabolic

acidosis.  The consent was for DKA.  He was admitted in the ICU and IV hydration has

been done.  IV fluid has been adjusted downwards.  We are now being consulted for

his acute kidney injury on top of his chronic renal failure.  He tells me he has

underlying chronic renal problem.  He is originally from [place], but currently

travels and has a temporary residence in [place]. 



REVIEW OF SYSTEMS:  Positive for generalized malaise.  Positive for left leg

erythema and cellulitis.  Positive for nausea, but no vomiting.  Decreased energy

level.  Decreased appetite.  No headache.  No diplopia.  No syncopal episode.

Denies any fever or chills.  No abdominal pain.  No hematochezia.  No melena.  No

hematemesis. 



MEDICATIONS:  Currently on;

1. DuoNeb q.6 p.r.n.

2. Aspirin 81 mg tablet daily.

3. Clindamycin 900 mg IV q.8.

4. D5 half-normal saline at 150 mL an hour.

5. Lovenox 110 mg subcu daily.

6. Pepcid 20 mg daily.

7. Insulin drip.

8. Zofran 4 mg IV q.6 p.r.n.

9. Zosyn 2.25 g IV q.6.

10. Status post vancomycin.



PAST MEDICAL HISTORY:  

1. History of decreased EF.

2. Type 2 diabetes mellitus.

3. Chronic renal failure from ? diabetic nephropathy.

4. Hyperlipidemia.

5. Hypertension.

6. Coronary artery disease.



PAST SURGICAL HISTORY:  Status post cardiac cath with coronary stent placement,

status post left shoulder surgery, and status post left knee surgery. 



SOCIAL HISTORY:  The patient is .  He is originally from [place], but

temporarily resides in [place] due to his job.  He is an .  He

has 3 natural children and 1 step child.  Smoke half a pack a day currently for the

last 40 years.  In addition, he dips snuff.  Education, high school.  No IV drug

abuse.  Alcohol rarely. 



ALLERGIES:  MORPHINE SULFATE.



TRAUMA:  None.



IMMUNIZATION:  Up-to-date.



HOSPITALIZATIONS:  Please see past medical history.



FAMILY HISTORY:  No family history of ESRD.



PHYSICAL EXAMINATION:

VITAL SIGNS:  Blood pressure is 111/66, heart rate 107, respiratory rate 20, and

pulse ox 100%. 

GENERAL:  Awake, alert, comfortable, obese, not in overt distress. 

SKIN:  Adequate turgor. 

HEENT:  He has a pinkish conjunctivae.  Anicteric sclerae. 

NECK:  No neck mass.  No carotid bruits.  No JVD. 

CHEST:  No deformities. 

LUNGS:  Decreased breath sounds. 

HEART:  Normal sinus rhythm.  No murmurs, gallops, or rubs. 

ABDOMEN:  Globular, soft, and nontender.  No masses. 

EXTREMITIES:  No edema.  Positive for erythema in left leg.



LABORATORY DATA:  Laboratories of January 2019, white count 12.2 and hemoglobin 12. 



January 2019; sodium 126, potassium 4.1, chloride 99, carbon dioxide 14, BUN 57,

creatinine 3.53, glucose 220, and calcium 7.0. 



On January 7, 2019; sodium 128, BUN 50, and creatinine 3.51. 



Chest x-ray of January 7, 2019, shows no CHF. 



Cardiac echo, decreased EF of 45%.



ASSESSMENT AND PLAN:  

1. Hyponatremia - consider this secondary to the elevated blood sugar.  If the serum

sodium remains unimproved, I will change the IV fluid to a D5 normal saline. 

2. Acute kidney injury on top of his chronic renal failure secondary to

hemodynamically-mediated dysfunction.  Please note, he has some degree of volume

depletion on clinical exam.  In addition, the patient has been taking losartan.

Please note these losartan has been discontinued.  Continue gentle volume repletion.

 Currently, on IV fluid.  There is no indication for any dialytic intervention with

this patient. 

3. Sepsis syndrome - on IV antibiotics.

4. ? Diabetic ketoacidosis - currently on insulin regimen.

5. Left leg cellulitis.  Dr. Oglesby to evaluate the patient.







Job ID:  [id number]

## 2019-01-08 NOTE — PRG
DATE OF SERVICE:  01/08/2019



SUBJECTIVE:  Danial Bojorquez this morning remains in the ICU, confused and agitated. 



His pulse is 113, atrial fibrillation, respirations 34, blood pressure 110/86.

Denies any pain. His family is at the bedside. His left leg is much more swollen

with multiple blisters.  He has an area in the left groin which looks infected. 



OBJECTIVE:  CHEST: Decreased breath sounds. No wheezing. 

CARDIAC: Normal S1 and S2. No gallops. 

ABDOMEN: No masses.



LABORATORY DATA:  White count is 12,000, H and H 12 and 35, platelet count is 116,

40 segs, 48 bands. His creatinine 3.53. 



Cortisol level is 24.



IMPRESSION:  

1. Chronic obstructive pulmonary disease.

2. Sepsis syndrome.

3. Infected leg.

4. Encephalopathy.

5. Renal failure.

6. Sleep apnea.



PLAN:  Surgery is being consulted today to assess his leg.  Additional intervention

at this time is warranted. He is on broad-spectrum antibiotics including

clindamycin, vancomycin. 



Continue nocturnal BiPAP, supportive care, PT. Continue observation in the ICU.

Prognosis remains guarded. 



This is a one-half hour of critical time.





Job ID:  [id number]

## 2019-01-08 NOTE — PDOC.PN
- Subjective


Encounter Start Date: 01/08/19


Encounter Start Time: 10:13


Subjective: confused





- Objective


Resuscitation Status - Order Detail:





01/07/19 04:33


Resuscitation Status Routine 


   Resuscitation Status: FULL: Full Resuscitation








MAR Reviewed: Yes


Vital Signs & Weight: 


 Vital Signs (12 hours)











  Temp Pulse Resp Pulse Ox


 


 01/08/19 09:11   113 H  


 


 01/08/19 07:55   113 H  34 H  99


 


 01/08/19 07:00  98.1 F   


 


 01/08/19 04:07   127 H  


 


 01/08/19 04:00  97.8 F   


 


 01/08/19 01:16   90  20  98








 Weight











Admit Weight                   249 lb


 


Weight                         249 lb 1.957 oz











 Most Recent Monitor Data











Heart Rate from ECG            107


 


NIBP                           111/66


 


NIBP BP-Mean                   81


 


Respiration from ECG           20


 


SpO2                           100














I&O: 


 











 01/07/19 01/08/19 01/09/19





 06:59 06:59 06:59


 


Intake Total 1100.6 7591.8 


 


Output Total 250 410 0


 


Balance 850.6 7181.8 0











Result Diagrams: 


 01/08/19 04:09





 01/08/19 04:09


Additional Labs: 


 Accuchecks











  01/08/19 01/08/19 01/08/19





  09:37 07:54 06:24


 


POC Glucose  174 H  174 H  180 H














  01/08/19 01/08/19 01/08/19





  05:21 04:13 03:37


 


POC Glucose  201 H  209 H  228 H














  01/08/19 01/08/19 01/08/19





  02:14 01:32 00:18


 


POC Glucose  194 H  181 H  178 H














  01/07/19 01/07/19 01/07/19





  23:39 22:22 21:36


 


POC Glucose  217 H  237 H  240 H














  01/07/19 01/07/19 01/07/19





  20:22 19:28 18:03


 


POC Glucose  252 H  231 H  186 H














  01/07/19 01/07/19 01/07/19





  17:17 16:19 15:10


 


POC Glucose  193 H  164 H  153 H














  01/07/19 01/07/19 01/07/19





  14:06 12:08 11:23


 


POC Glucose  147 H  166 H  180 H














  01/07/19





  10:12


 


POC Glucose  196 H











Radiology Reviewed by me: No (echo-LVEF 40-45%)


EKG Reviewed by me: Yes (AF with RVR)





Phys Exam





- Physical Examination


Neck: no JVD


Respiratory: clear to auscultation bilateral


Cardiovascular: irregular


tachy


Gastrointestinal: soft, positive bowel sounds


left leg-tense swelling to groin- purpura popliteal and calf area


both feet warm





Dx/Plan


(1) Septic shock


Code(s): A41.9 - SEPSIS, UNSPECIFIED ORGANISM; R65.21 - SEVERE SEPSIS WITH 

SEPTIC SHOCK   Status: Acute   





(2) Acute renal failure


Status: Acute   


Qualifiers: 


   Acute renal failure type: with acute tubular necrosis   Qualified Code(s): 

N17.0 - Acute kidney failure with tubular necrosis   





(3) Abscess of left thigh


Code(s): L02.416 - CUTANEOUS ABSCESS OF LEFT LOWER LIMB   Status: Acute   





(4) CAD (coronary artery disease)


Code(s): I25.10 - ATHSCL HEART DISEASE OF NATIVE CORONARY ARTERY W/O ANG PCTRS 

  Status: Acute   


Qualifiers: 


   Coronary Disease-Associated Artery/Lesion type: native artery   Native vs. 

transplanted heart: native heart   Associated angina: without angina   

Qualified Code(s): I25.10 - Atherosclerotic heart disease of native coronary 

artery without angina pectoris   





(5) HTN (hypertension)


Code(s): I10 - ESSENTIAL (PRIMARY) HYPERTENSION   Status: Acute   


Qualifiers: 


   Hypertension type: essential hypertension   Qualified Code(s): I10 - 

Essential (primary) hypertension   





(6) DM type 2 (diabetes mellitus, type 2)


Status: Acute   


Qualifiers: 


   Diabetes mellitus long term insulin use: with long term use   Diabetes 

mellitus complication status: with kidney complications 





- Plan


pressors as needed. iv amiodaarone for Atrial fib with RVR


-: renal consult. surgery cnsult


-: cont broad spectrum antibx





* .

## 2019-01-08 NOTE — OP
DATE OF PROCEDURE:  01/08/2019



PREOPERATIVE DIAGNOSIS:  Suspected necrotizing soft tissue infection, left groin.



POSTOPERATIVE DIAGNOSES:  Left groin and left leg bullous cellulitis.



OPERATIONS PERFORMED:  Incision and drainage of left groin cellulitis.



ESTIMATED BLOOD LOSS:  Less than 5 mL.



COMPLICATIONS:  None apparent.



INDICATIONS FOR OPERATION:  A 68-year-old morbidly obese male with history of type 2

diabetes mellitus, presented with worsening left groin pain associated with swelling

and bullous lesions. 



Clinical radiographic examination was suspicious for necrotizing soft tissue

infection of the left groin, for which the patient was brought to the operating room

for left groin exploration. 



Findings consistent with profound edema and inflammation of the left groin.  No

active gross purulence on necrosis present. 



DESCRIPTION OF OPERATION:  Informed consent was obtained from the patient, who was

brought to the operating room and placed in supine position. 



Following general anesthesia, the left groin and left leg were widely prepped and

draped in the usual fashion. 



Two previous openings of the left groin were noted. 



I then made an oblique incision to connect it to openings using a 10 scalpel. 



Incision was carried through subcutaneous tissues, maintain hemostasis using

cautery. 



Healthy fat was encountered.  There was profound amount of edema, serous fluid was

poured out of the wound. 



Cultures were taken here.  There was no gross purulence or necrosis encountered. 



Exploration was attempted at this juncture. 



The wound was packed with a saline saturated gauze. 



The patient tolerated this procedure without any apparent complication and was

returned to the intensive care unit in critical, but stable condition. 







Job ID:  [id number]

## 2019-01-08 NOTE — CON
DATE OF CONSULTATION:  01/08/2019



REQUESTING PHYSICIAN:  Dr. Lawrence Oglesby.



BRIEF HISTORY OF PRESENT ILLNESS:  The patient is a 68-year-old gentleman who I have

examined in the intensive care unit at the request of Dr. Oglesby.  The patient

initially presented to the [facility] with complaints of left

groin pain, fatigue, and shortness of breath.  He states that he had been feeling

ill over the prior 3-5 days.  Upon evaluation, he was found to have two small areas

that appeared to be small abscess cavities as well as cellulitic change in this

thigh extending down towards the knee.  Over the last 48 hours, his general health

has significantly worsened and he now has lower extremity swelling that is extensive

with punctate areas of erythema as well as some blistering at the popliteal fossa of

the skin as well as along the shin.  On evaluation, he was found to have a very

tense lower leg and concern was appropriately demonstrated regarding the possibility

of compartment syndrome, as such Orthopedic consultation requested. 



PAST MEDICAL HISTORY:  Remarkable for coronary artery disease, diabetes,

hypertension, hyperlipidemia, and peripheral neuropathy. 



PAST SURGICAL HISTORY:  Includes cardiac stents, left shoulder surgery, and knee

surgery. 



MEDICATIONS:  Include amlodipine, aspirin, fenofibrate, NovoLog, losartan,

metformin, metoprolol, and multivitamins. 



ALLERGIES:  TO MULTIPLE MORPHINE.



FAMILY HISTORY:  Noncontributory for this compartment syndrome.



SOCIAL HISTORY:  He is .  Smokes half pack cigarettes daily.  Also consumes

snuff.  Occasional alcohol.  Denies recreational drug use. 



PHYSICAL EXAMINATION:

VITAL SIGNS:  He is found to have a temperature 98.6, heart rate of 92, respiratory

rate of 34, blood pressure of 145/55. 

GENERAL:  The patient is found to be awake, alert, and oriented, resting comfortably

in his hospital bed.  Breathing is unlabored. 

EXTREMITIES:  Exam today is really limited to this left lower extremity as an urgent

exam given concern for compartment.  This extremity is remarkable for in the

anteromedial groin 1-2 cm areas of what appeared to be superficial abscesses with

now some fibrinous exudate over the top of both these areas.  Around this area, the

skin is felt to be woody and indurated with some erythema.  As you head down the

thigh, the erythema tends to dissipate, although he still has significant

subcutaneous edema extending all the way down to the level of the knee.  At this

level, the erythema begins to recur and he is found to have this punctate almost

bruised type appearance of the lower leg with a number of areas of blistering of the

skin.  This extends all the way down to the foot.  There is no full-thickness loss

of skin.  These blisters are predominantly serous filled.  He is found to have a

calf that feels tense, however, I believe this is subcutaneous fluid that is giving

this feel.  The patient does not have pain with passive stretch of his great toe or

lesser toes.  He has minimal pain with passive plantar and dorsiflexion of the ankle

and I do not find any evidence for compartment syndrome at this time. 



LABORATORY DATA:  He was found to have a white blood cell count of 12.2, hematocrit

of 35.5 and 116,000 platelets. 



ASSESSMENT:  A 68-year-old gentleman with clinical evidence for sepsis and

significant cellulitic change in the left lower extremity without evidence for

obvious lower leg abscess and certainly no evidence for compartment syndrome at this

time. 



PLAN:  Today, I did discuss these findings with Dr. Oglesby.  Our plan at this time is

for Dr. Oglesby to proceed to the operating room for incision and drainage of these

two small groin subcu abscesses.  If there is found to be more extensive tissue

death deeper, then obviously a more extensive debridement will be necessary,

however, I do not see an obvious surgical lesion in the lower leg and again no

evidence for compartment syndrome at this time.  We will follow the patient while he

is in the hospital. 







Job ID:  [id number]

## 2019-01-08 NOTE — CON
DATE OF CONSULTATION:  01/08/2019



REQUESTING PHYSICIAN:  Dr. Corral.



HISTORY OF PRESENT ILLNESS:  A 68-year-old morbidly obese  man with 
history

of diabetes mellitus. 



The patient was admitted yesterday with worsening painful left groin associated 
with

redness and bullous lesions to the lower extremity. 



Five days previously, the patient was seen in an [facility] hospital in [place] with

malaise and left groin pain. 



Viral etiology was suspected, at which time, the patient was discharged home 
with

conservative therapy. 



He presented again to the emergency department yesterday with redness to the 
left

groin associated with worsening pain and swelling. 



I and D of suspected left groin abscess was performed.  The patient was 
transferred

to [place], [place], where he was admitted to intensive care unit 
with

a diagnosis of septic shock complicating left lower extremity cellulitis.  CT 
scan

of the pelvis was obtained, which was suspicious for necrotizing soft tissue

infection of the left groin and scrotum.  I was asked to evaluate the patient to

exclude necrotizing soft tissue infection.  On my evaluation, the patient is 
awake,

but confused, though interactive.  He moves all extremities and complains of 
severe

left lower extremity pain. 



PAST MEDICAL HISTORY:  Significant for type 2 diabetes mellitus, coronary artery

disease, essential hypertension, hyperlipidemia. 



SURGICAL HISTORY:  Pertinent for five-vessel coronary artery stenting in 1998.

Other surgical history includes left shoulder arthroplasty and arthroscopic knee

surgery. 



SOCIAL HISTORY:  The patient is .  Lives at home with his wife.  He 
smokes

half pack of cigarettes per day and has about 30-pack-year cigarette smoking

history.  He also dips snuff.  The patient admits to occasional intake of 
ethanol in

very moderate amounts.  He denies any illicit drug abuse. 



FAMILY HISTORY:  Noncontributory for this patient's age.



CURRENT MEDICATIONS:  Include,

1. Enoxaparin subcutaneously daily.

2. Famotidine 20 mg p.o. daily.

3. Zosyn 3.375 g IV q.6 hours.

4. Clindamycin 900 mg IV q.8 hours.

5. Amiodarone by continuous infusion.



ALLERGIES:  MORPHINE.



REVIEW OF SYSTEMS:  Essentially unremarkable except for as stated in the past

medical history and chief complaint. 



PHYSICAL EXAMINATION:

GENERAL:  This reveals a 68-year-old normally developed man, who is although

confused, but interactive and appears to be in no acute distress at the time of 
my

evaluation. 

VITAL SIGNS:  Today include blood pressure 99/56, pulse 121 and irregular,

respiratory rate is 17, temperature is 97.8 degrees Fahrenheit, oxygen 
saturation

100% on 4 L by nasal cannula oxygen. 

HEENT:  Reveals normocephalic and atraumatic.  Pupils are equal, round, and 
reactive

to light and accommodation.  Extraocular muscles are intact bilaterally.  No 
scleral

icterus is present. 

HEART:  Reveals irregular rate and irregular rhythm. 

LUNGS:  Reveal bibasilar rhonchi.  Breathing is otherwise regular and 
nonlabored. 

ABDOMEN:  Soft and obese with no tenderness to palpation.  Liver and spleen are

nonpalpable below costal margin. 

EXTREMITIES:  Reveal 2+ bilateral radial and pedal pulses.  There are 2 punctate

openings in the left groin laterally and medially with surrounding erythema.  No

active drainage from these openings, which represent recent incision and 
drainage of

suspected groin abscesses.  The left thigh is markedly edematous, but nontender.

The patient has bullous lesions above the left popliteal fossa as well as 
diffuse

area of the left leg below the knee extending to the left heel.  Some of the 
bullous

lesions are now open.  No gross purulence noted.  The patient has negative 
Homans

sign.  The left calf and left thigh, however, appeared tense.  Left foot is 
warm to

touch, although both the posterior tibial and dorsalis pedis pulses were not

palpable.  Capillary refills are less than 2 seconds in all extremities. 

NEUROLOGIC:  Reveals no focal deficits present.



LABORATORY DATA:  Pertinent laboratory findings today include CBC with 12,200 
white

blood cells, hemoglobin and hematocrit 12.0 and 35.5 respectively, platelet 
count is

116,000. 



Differential count as follows; 40% segmented neutrophils, 48% bands, 3 
lymphocytes,

3 monocytes. 



Metabolic profile; sodium 126, potassium is 4.1, chloride is 99, bicarb is 14, 
BUN

57, creatinine is 3.53, glucose is 220. 



I have personally reviewed the CT scan of the abdomen and pelvis, which was 
obtained

on this admission.  This is remarkable for cellulitis of the proximal left thigh

region with associated inguinal adenopathy.  I did not see any evidence of

necrotizing soft tissue infection.  No subcutaneous emphysema or fluid 
collection is

evident. 



IMPRESSIONS:  

1. Bullous cellulitis of the left groin and leg.

2. History of coronary artery disease.

3. History of diabetes mellitus.

4. Acute on chronic renal insufficiency.



RECOMMENDATIONS:  Continue with current antibiotic regimen.  We will take the

patient to the operating room and explore the left groin to exclude necrotizing 
soft

tissue infection. 



Above findings and plan has been discussed with the patient and his wife at 
bedside.

 They both indicated understanding of the information given. 



I answered their questions.







Job ID:  [id number]



MTDD

## 2019-01-08 NOTE — ULT
ULTRASOUND RENAL BILATERAL STANDARD:

 

HISTORY: 

Acute renal failure.

 

COMPARISON: 

None.

 

FINDINGS: 

Real-time, gray scale, and color evaluation of the kidneys and urinary bladder was performed.

 

The right kidney measures 7.5 x 6.8 x 7.4 cm and the left kidney measures 13 x 7 x 7.2 cm.  No renal 
mass, hydronephrosis, or abnormal calcifications.  The urinary bladder is decompressed with a Tom c
atheter.

 

IMPRESSION: 

No evidence for obstructive uropathy.

 

POS: [facility]

## 2019-01-09 LAB
ANALYZER IN CARDIO: (no result)
ANION GAP SERPL CALC-SCNC: 15 MMOL/L (ref 10–20)
BASE EXCESS STD BLDA CALC-SCNC: -11.4 MEQ/L
BUN SERPL-MCNC: 59 MG/DL (ref 8.4–25.7)
CA-I BLDA-SCNC: 0.96 MMOL/L (ref 1.12–1.3)
CALCIUM SERPL-MCNC: 7 MG/DL (ref 7.8–10.44)
CHD RISK SERPL-RTO: (no result) (ref ?–4.5)
CHLORIDE SERPL-SCNC: 102 MMOL/L (ref 98–107)
CHOLEST SERPL-MCNC: 124 MG/DL
CO2 SERPL-SCNC: 14 MMOL/L (ref 23–31)
CREAT CL PREDICTED SERPL C-G-VRATE: 35 ML/MIN (ref 70–130)
DIGOXIN SERPL-MCNC: 1.39 NG/ML (ref 0.8–2)
DOHLE BOD BLD QL SMEAR: SLIGHT
GLUCOSE SERPL-MCNC: 218 MG/DL (ref 80–115)
HCO3 BLDA-SCNC: 14.7 MEQ/L (ref 22–28)
HCT VFR BLDA CALC: 36 % (ref 42–52)
HDLC SERPL-MCNC: (no result) MG/DL
HGB BLD-MCNC: 11.3 G/DL (ref 14–18)
HGB BLDA-MCNC: 12.1 G/DL (ref 14–18)
LDLC SERPL CALC-MCNC: (no result) MG/DL
MCH RBC QN AUTO: 31.4 PG (ref 27–31)
MCV RBC AUTO: 93.3 FL (ref 78–98)
MDIFF COMPLETE?: YES
O2 A-A PPRESDIFF RESPIRATORY: 155.07 MM[HG] (ref 0–20)
PCO2 BLDA: 33.9 MMHG (ref 35–45)
PH BLDA: 7.26 [PH] (ref 7.35–7.45)
PLATELET # BLD AUTO: 152 THOU/UL (ref 130–400)
PO2 BLDA: 123.4 MMHG (ref 80–?)
POLYCHROMASIA BLD QL SMEAR: (no result) (100X)
POTASSIUM BLD-SCNC: 4.43 MMOL/L (ref 3.7–5.3)
POTASSIUM SERPL-SCNC: 4.4 MMOL/L (ref 3.5–5.1)
RBC # BLD AUTO: 3.61 MILL/UL (ref 4.7–6.1)
SODIUM SERPL-SCNC: 127 MMOL/L (ref 136–145)
SPECIMEN DRAWN FROM PATIENT: (no result)
TRIGL SERPL-MCNC: 492 MG/DL (ref ?–150)
WBC # BLD AUTO: 23.5 THOU/UL (ref 4.8–10.8)

## 2019-01-09 RX ADMIN — INSULIN HUMAN PRN UNITS: 100 INJECTION, SOLUTION PARENTERAL at 11:54

## 2019-01-09 RX ADMIN — ASPIRIN 81 MG CHEWABLE TABLET SCH MG: 81 TABLET CHEWABLE at 08:14

## 2019-01-09 RX ADMIN — CLINDAMYCIN PHOSPHATE SCH MLS: 900 INJECTION, SOLUTION INTRAVENOUS at 08:14

## 2019-01-09 RX ADMIN — Medication SCH ML: at 08:15

## 2019-01-09 RX ADMIN — HYDROCORTISONE SODIUM SUCCINATE SCH MG: 100 INJECTION, POWDER, FOR SOLUTION INTRAMUSCULAR; INTRAVENOUS at 11:46

## 2019-01-09 RX ADMIN — POTASSIUM CHLORIDE, DEXTROSE MONOHYDRATE AND SODIUM CHLORIDE PRN MLS: 150; 5; 450 INJECTION, SOLUTION INTRAVENOUS at 08:14

## 2019-01-09 RX ADMIN — Medication SCH MLS: at 17:11

## 2019-01-09 RX ADMIN — Medication SCH MLS: at 20:45

## 2019-01-09 RX ADMIN — CLINDAMYCIN PHOSPHATE SCH MLS: 900 INJECTION, SOLUTION INTRAVENOUS at 00:55

## 2019-01-09 RX ADMIN — HYDROCORTISONE SODIUM SUCCINATE SCH MG: 100 INJECTION, POWDER, FOR SOLUTION INTRAMUSCULAR; INTRAVENOUS at 23:07

## 2019-01-09 RX ADMIN — INSULIN HUMAN PRN UNITS: 100 INJECTION, SOLUTION PARENTERAL at 23:05

## 2019-01-09 RX ADMIN — CLINDAMYCIN PHOSPHATE SCH MLS: 900 INJECTION, SOLUTION INTRAVENOUS at 16:35

## 2019-01-09 RX ADMIN — Medication SCH MLS: at 07:17

## 2019-01-09 RX ADMIN — PENICILLIN G POTASSIUM SCH MLS: 5000000 POWDER, FOR SOLUTION INTRAMUSCULAR; INTRAPLEURAL; INTRATHECAL; INTRAVENOUS at 06:00

## 2019-01-09 RX ADMIN — Medication SCH MLS: at 11:47

## 2019-01-09 RX ADMIN — INSULIN HUMAN PRN UNITS: 100 INJECTION, SOLUTION PARENTERAL at 16:10

## 2019-01-09 RX ADMIN — Medication SCH ML: at 19:49

## 2019-01-09 RX ADMIN — PENICILLIN G POTASSIUM SCH MLS: 5000000 POWDER, FOR SOLUTION INTRAMUSCULAR; INTRAPLEURAL; INTRATHECAL; INTRAVENOUS at 13:22

## 2019-01-09 RX ADMIN — PENICILLIN G POTASSIUM SCH MLS: 5000000 POWDER, FOR SOLUTION INTRAMUSCULAR; INTRAPLEURAL; INTRATHECAL; INTRAVENOUS at 21:04

## 2019-01-09 RX ADMIN — HYDROCORTISONE SODIUM SUCCINATE SCH MG: 100 INJECTION, POWDER, FOR SOLUTION INTRAMUSCULAR; INTRAVENOUS at 17:10

## 2019-01-09 RX ADMIN — INSULIN HUMAN PRN UNITS: 100 INJECTION, SOLUTION PARENTERAL at 19:49

## 2019-01-09 RX ADMIN — Medication SCH MLS: at 02:18

## 2019-01-09 NOTE — PDOC.PN
- Subjective


Encounter Start Date: 01/09/19


Encounter Start Time: 08:22


Subjective: intubated, arousable





- Objective


Resuscitation Status - Order Detail:





01/07/19 04:33


Resuscitation Status Routine 


   Resuscitation Status: FULL: Full Resuscitation








MAR Reviewed: Yes


Vital Signs & Weight: 


 Vital Signs (12 hours)











  Temp Pulse Resp BP Pulse Ox


 


 01/09/19 08:00    24 H  


 


 01/09/19 07:44      96


 


 01/09/19 07:00  99.4 F    


 


 01/09/19 06:42   81   125/51 L 


 


 01/09/19 06:39   81  20   98


 


 01/09/19 06:00    20  


 


 01/09/19 05:00  99.0 F    


 


 01/09/19 04:00    20  


 


 01/09/19 02:00    20  


 


 01/09/19 00:00  99.1 F   20  


 


 01/08/19 23:37   81  20   97


 


 01/08/19 22:00    20  








 Weight











Admit Weight                   249 lb


 


Weight                         249 lb 1.957 oz











 Most Recent Monitor Data











Heart Rate from ECG            83


 


NIBP                           124/58


 


NIBP BP-Mean                   80


 


Respiration from ECG           16


 


SpO2                           98














I&O: 


 











 01/08/19 01/09/19 01/10/19





 06:59 06:59 06:59


 


Intake Total 7591.8 4454.1 


 


Output Total 410 1515 80


 


Balance 7181.8 2939.1 -80











Result Diagrams: 


 01/09/19 05:30





 01/09/19 05:30


Additional Labs: 


 Accuchecks











  01/09/19 01/09/19 01/09/19





  06:14 05:36 04:28


 


POC Glucose  190 H  196 H  196 H














  01/09/19 01/09/19 01/09/19





  03:36 02:31 01:37


 


POC Glucose  177 H  171 H  161 H














  01/09/19 01/08/19 01/08/19





  00:35 23:41 22:43


 


POC Glucose  144 H  136 H  133 H














  01/08/19 01/08/19 01/08/19





  21:34 20:30 19:37


 


POC Glucose  127 H  128 H  132 H














  01/08/19 01/08/19 01/08/19





  17:22 15:10 13:34


 


POC Glucose  149 H  172 H  174 H














  01/08/19 01/08/19 01/08/19





  11:09 09:37 07:54


 


POC Glucose  157 H  174 H  174 H











Radiology Reviewed by me: Yes (cxr-ET tube, plate atelectasis)





Phys Exam





- Physical Examination


Neck: no JVD


coarse BS


Cardiovascular: RRR, no significant murmur


Gastrointestinal: soft, non-tender, positive bowel sounds


left leg-tense swelling foot to groin, purpura and areas of eschar on calf





Dx/Plan


(1) Septic shock


Code(s): A41.9 - SEPSIS, UNSPECIFIED ORGANISM; R65.21 - SEVERE SEPSIS WITH 

SEPTIC SHOCK   Status: Acute   





(2) Acute renal failure


Status: Acute   


Qualifiers: 


   Acute renal failure type: with acute tubular necrosis   Qualified Code(s): 

N17.0 - Acute kidney failure with tubular necrosis   





(3) Abscess of left thigh


Code(s): L02.416 - CUTANEOUS ABSCESS OF LEFT LOWER LIMB   Status: Acute   





(4) CAD (coronary artery disease)


Code(s): I25.10 - ATHSCL HEART DISEASE OF NATIVE CORONARY ARTERY W/O ANG PCTRS 

  Status: Acute   


Qualifiers: 


   Coronary Disease-Associated Artery/Lesion type: native artery   Native vs. 

transplanted heart: native heart   Associated angina: without angina   

Qualified Code(s): I25.10 - Atherosclerotic heart disease of native coronary 

artery without angina pectoris   





(5) HTN (hypertension)


Code(s): I10 - ESSENTIAL (PRIMARY) HYPERTENSION   Status: Acute   


Qualifiers: 


   Hypertension type: essential hypertension   Qualified Code(s): I10 - 

Essential (primary) hypertension   





(6) DM type 2 (diabetes mellitus, type 2)


Status: Acute   


Qualifiers: 


   Diabetes mellitus long term insulin use: with long term use   Diabetes 

mellitus complication status: with kidney complications 





- Plan


cont iv antibx


-: cont accu/ iv insulin


-: vent per intensivist


-: tube feedings on hold


-: pronosis guarded, viability of left leg questionable





* .

## 2019-01-09 NOTE — PRG
DATE OF SERVICE:  



Danial Bojorquez remains intubated in the vent, sedated on Diprivan and fentanyl.



OBJECTIVE:  VITAL SIGNS:  Pulse 85, blood pressure 116/60, respiratory set at 20.

He opens his eyes.  His urine output is 4454 in, 1515 out.  His maximum temperature

has been 99. 

CHEST:  Decreased breath sounds, no wheezing. CARDIAC:  Normal S1 and S2.  No

gallops. 

ABDOMEN:  No masses. 



His left leg still remains markedly swollen with extensive blistering.  His white

count is 23,000.  His platelet count is normal.  H and H are unremarkable.  He has

57 segs, 36 bands.  PO2 is 123, pCO2 of __________ rate of 20, 45%.  Creatinine is

3.27.  Sodium 127. 



ASSESSMENT:  

1. Sepsis syndrome, cellulitis, left leg.

2. Diabetes.

3. Renal failure.

4. Decreased ejection fraction, supraventricular tachycardia.



PLAN:  He is not weanable.  Continue clindamycin, Zosyn, neb treatments, supportive

care. 



I am going to initiate low-dose steroids because of his relative adrenal

insufficiency. 



One half hour critical time.







Job ID:  [id number]

## 2019-01-09 NOTE — RAD
PORTABLE SEMIUPRIGHT FRONTAL CHEST:

 

Date:  01/09/19 

 

COMPARISON:  

01/07/19. 

 

HISTORY:  

Ventilated patient. 

 

FINDINGS:

Shallow inspiration, body habitus, and portable technique limit detailed assessment. Right-sided vasc
ular catheter terminates over the region of the SVC. Endotracheal tube and nasogastric tube in proper
 position. Increased density in the medial left base suggests infiltrate or volume loss. Pulmonary va
scular congestion noted with mild nonspecific increased density in the right paratracheal region. 

 

IMPRESSION: 

Lines and tubes as above. Nonspecific increased density in right perihilar region and medial left gus
g base. 

 

 

POS: [facility]

## 2019-01-09 NOTE — PRG
DATE OF SERVICE:  01/09/2019



RENAL MEDICINE



SUBJECTIVE:  Mr. Bojorquez is a 68-year-old white male, who was seen by the Renal

Service for his acute kidney injury secondary to a presumed hemodynamically mediated

renal dysfunction.  The patient was admitted due to severely elevated blood sugar

and for a sepsis syndrome.  He was also diagnosed to have necrotizing fasciitis. 



Renal function has slowly been sterilizing with IV volume repletion. 



Most recent creatinine is now noted at 3.27 and yesterday, this was noted to be

3.53. 



No acute events noted last night.  The patient is noted to be diuresing.



OBJECTIVE:  VITAL SIGNS:  Blood pressure is 106/44, heart rate 84, respiratory rate

is 20, pulse ox is 97%. 

GENERAL:  The patient is sedated and intubated on ventilator support. 

SKIN:  Adequate turgor. 

HEENT:  He has pinkish conjunctivae.  Anicteric sclerae.  No neck mass.  No carotid

bruits.  No JVD. 

CHEST:  No deformities. 

LUNGS:  Decreased breath sounds. 

HEART:  Normal sinus rhythm.  No murmur.  No gallops.  No rubs. 

ABDOMEN:  Globular, soft, nontender.  No masses. 

EXTREMITIES:  Trace edema.



MEDICATIONS:  Medications of January 9, 2019 was reviewed.



LABORATORY DATA:  Laboratories of January 9, 2019; sodium 127, potassium 4.4,

chloride 102, carbon dioxide 14, BUN 59, creatinine 3.27, glucose 218, calcium 7.0.

Triglycerides 492, cholesterol 124. 



Urinalysis of January 8, 2019, protein is 30.  No pigmented granular casts. 



January 8, 2019, renal ultrasound shows no evidence for obstructive uropathy.



ASSESSMENT AND PLAN:  

1. Acute kidney injury, consider hemodynamically mediated renal dysfunction.

Continue to optimize hemodynamics.  Continue IV fluids-currently on normal saline.

Please note, the patient is diuresing at least 50 mL an hour.  Continue supportive

care.  There is no indication for any acute dialytic intervention. 

2. Chronic renal failure-the patient most likely has underlying diabetic nephropathy.

3. Necrotizing fasciitis, currently on IV antibiotics.  ID and surgery are following.







Job ID:  [id number]

## 2019-01-09 NOTE — PRG
DATE OF SERVICE:  01/09/2019



SUBJECTIVE:  The patient is in the ICU, intubated.



OBJECTIVE:  VITAL SIGNS:  T-max 99.4, blood pressure 150/68, pulse 84, respirations

21, O2 saturation 95%.  His urine output has picked up quite a bit over the past 24

hours. 

HEENT:  His ocular movements are conjugate.  Pupils are miotic. 

LUNGS:  With coarse breath sounds with no crackles or wheezing. 

HEART:  S1, S2.  Regular rate. 

ABDOMEN:  Soft, mildly distended. 

EXTREMITIES:  Left leg, the skin of the leg is more supple, still there are those

blisters, hemorrhagic.  The ones that are ruptured have a sort of necrotic base. 



LABORATORY DATA:  Microbiology with group A Streptococcus, Streptococcus pyogenes

from 2 sets of blood cultures. 



IMAGING STUDIES:  Chest x-ray with lines and tubes as above, nonspecific, increased

density in right perihilar region. 



ASSESSMENT AND DISCUSSION:  Type 2 diabetes inflammatory process with aggressive

features in left lower extremity, multiorgan dysfunction with Streptococcus pyogenes

bacteremia.  The patient continues on supportive therapy, clindamycin, and

penicillin G, adjusted for renal function.  His kidney function, I think, is

starting to  significantly with now nonoliguric renal failure.  The concern

with necrotizing infection persists, and we will continue monitoring. 







Job ID:  [id number]

## 2019-01-10 LAB
ANALYZER IN CARDIO: (no result)
ANION GAP SERPL CALC-SCNC: 20 MMOL/L (ref 10–20)
BASE EXCESS STD BLDA CALC-SCNC: -16.7 MEQ/L
BUN SERPL-MCNC: 66 MG/DL (ref 8.4–25.7)
CA-I BLDA-SCNC: 1.02 MMOL/L (ref 1.12–1.3)
CALCIUM SERPL-MCNC: 7.4 MG/DL (ref 7.8–10.44)
CHLORIDE SERPL-SCNC: 105 MMOL/L (ref 98–107)
CO2 SERPL-SCNC: 10 MMOL/L (ref 23–31)
CREAT CL PREDICTED SERPL C-G-VRATE: 33 ML/MIN (ref 70–130)
DOHLE BOD BLD QL SMEAR: SLIGHT
GLUCOSE SERPL-MCNC: 300 MG/DL (ref 80–115)
HCO3 BLDA-SCNC: 11.2 MEQ/L (ref 22–28)
HCT VFR BLDA CALC: 35 % (ref 42–52)
HGB BLD-MCNC: 11.2 G/DL (ref 14–18)
HGB BLDA-MCNC: 11.9 G/DL (ref 14–18)
MCH RBC QN AUTO: 30.8 PG (ref 27–31)
MCV RBC AUTO: 95.1 FL (ref 78–98)
MDIFF COMPLETE?: YES
O2 A-A PPRESDIFF RESPIRATORY: 135.62 MM[HG] (ref 0–20)
PCO2 BLDA: 33.9 MMHG (ref 35–45)
PH BLDA: 7.14 [PH] (ref 7.35–7.45)
PLATELET # BLD AUTO: 160 THOU/UL (ref 130–400)
PO2 BLDA: 107.2 MMHG (ref 80–?)
POTASSIUM BLD-SCNC: 5.28 MMOL/L (ref 3.7–5.3)
POTASSIUM SERPL-SCNC: 5.4 MMOL/L (ref 3.5–5.1)
RBC # BLD AUTO: 3.63 MILL/UL (ref 4.7–6.1)
SODIUM SERPL-SCNC: 130 MMOL/L (ref 136–145)
SPECIMEN DRAWN FROM PATIENT: (no result)
WBC # BLD AUTO: 30.2 THOU/UL (ref 4.8–10.8)

## 2019-01-10 RX ADMIN — CLINDAMYCIN PHOSPHATE SCH MLS: 900 INJECTION, SOLUTION INTRAVENOUS at 08:24

## 2019-01-10 RX ADMIN — HYDROCORTISONE SODIUM SUCCINATE SCH MG: 100 INJECTION, POWDER, FOR SOLUTION INTRAMUSCULAR; INTRAVENOUS at 17:20

## 2019-01-10 RX ADMIN — INSULIN HUMAN PRN UNITS: 100 INJECTION, SOLUTION PARENTERAL at 12:17

## 2019-01-10 RX ADMIN — FENTANYL CITRATE SCH MLS: 50 INJECTION, SOLUTION INTRAMUSCULAR; INTRAVENOUS at 03:17

## 2019-01-10 RX ADMIN — CLINDAMYCIN PHOSPHATE SCH MLS: 900 INJECTION, SOLUTION INTRAVENOUS at 17:20

## 2019-01-10 RX ADMIN — Medication SCH MLS: at 02:35

## 2019-01-10 RX ADMIN — HYDROCORTISONE SODIUM SUCCINATE SCH MG: 100 INJECTION, POWDER, FOR SOLUTION INTRAMUSCULAR; INTRAVENOUS at 11:30

## 2019-01-10 RX ADMIN — HYDROCORTISONE SODIUM SUCCINATE SCH MG: 100 INJECTION, POWDER, FOR SOLUTION INTRAMUSCULAR; INTRAVENOUS at 05:04

## 2019-01-10 RX ADMIN — ALBUMIN HUMAN SCH GM: 250 SOLUTION INTRAVENOUS at 21:50

## 2019-01-10 RX ADMIN — Medication SCH MLS: at 07:31

## 2019-01-10 RX ADMIN — PENICILLIN G POTASSIUM SCH MLS: 5000000 POWDER, FOR SOLUTION INTRAMUSCULAR; INTRAPLEURAL; INTRATHECAL; INTRAVENOUS at 14:36

## 2019-01-10 RX ADMIN — INSULIN GLARGINE SCH MLS: 100 INJECTION, SOLUTION SUBCUTANEOUS at 09:49

## 2019-01-10 RX ADMIN — INSULIN HUMAN PRN UNITS: 100 INJECTION, SOLUTION PARENTERAL at 16:21

## 2019-01-10 RX ADMIN — CLINDAMYCIN PHOSPHATE SCH MLS: 900 INJECTION, SOLUTION INTRAVENOUS at 00:08

## 2019-01-10 RX ADMIN — Medication SCH ML: at 21:50

## 2019-01-10 RX ADMIN — FENTANYL CITRATE SCH MLS: 50 INJECTION, SOLUTION INTRAMUSCULAR; INTRAVENOUS at 22:15

## 2019-01-10 RX ADMIN — INSULIN HUMAN PRN UNITS: 100 INJECTION, SOLUTION PARENTERAL at 03:11

## 2019-01-10 RX ADMIN — PENICILLIN G POTASSIUM SCH MLS: 5000000 POWDER, FOR SOLUTION INTRAMUSCULAR; INTRAPLEURAL; INTRATHECAL; INTRAVENOUS at 21:49

## 2019-01-10 RX ADMIN — INSULIN HUMAN PRN UNITS: 100 INJECTION, SOLUTION PARENTERAL at 20:33

## 2019-01-10 RX ADMIN — INSULIN GLARGINE SCH MLS: 100 INJECTION, SOLUTION SUBCUTANEOUS at 21:48

## 2019-01-10 RX ADMIN — Medication SCH ML: at 09:15

## 2019-01-10 RX ADMIN — INSULIN HUMAN PRN UNITS: 100 INJECTION, SOLUTION PARENTERAL at 08:23

## 2019-01-10 RX ADMIN — Medication SCH MLS: at 14:43

## 2019-01-10 RX ADMIN — ALBUMIN HUMAN SCH GM: 250 SOLUTION INTRAVENOUS at 16:12

## 2019-01-10 RX ADMIN — PENICILLIN G POTASSIUM SCH MLS: 5000000 POWDER, FOR SOLUTION INTRAMUSCULAR; INTRAPLEURAL; INTRATHECAL; INTRAVENOUS at 05:04

## 2019-01-10 NOTE — PRG
DATE OF SERVICE:  01/10/2019



RENAL MEDICINE



SUBJECTIVE:  Mr. Bojorquez is a 68-year-old white male, who came in for septic shock

and seen by the Renal Service for his acute kidney injury.  We feel that this could

be a hemodynamically-mediated renal dysfunction.  He is currently on fluid support.

I have restarted back his IV infusion.  He continues to diurese well. 



He has a presumptive diagnosis of left necrotizing fascitis of the left leg causing

sepsis. 



Creatinine is slightly higher today from yesterday. 



His last urine output the last 24 hours was noted 2.4 L.  He is averaging about 100

to 150 mL of urine per hour. 



No acute events noted.



OBJECTIVE:  VITAL SIGNS:  Blood pressure 113/50, heart rate 108, respiratory rate

71, O2 saturation 94%. 

GENERAL:  The patient is sedated, intubated, on ventilator support. 

SKIN:  Adequate turgor. 

HEENT:  He has pinkish conjunctivae.  Anicteric sclerae.  No neck mass.  No carotid

bruits.  No JVD. 

CHEST:  No deformities. 

LUNGS:  Clear breath sounds.  No wheezing.  No crackles. 

HEART:  Normal sinus rhythm.  No murmurs, gallops, or rubs. 

ABDOMEN:  Globular, soft, nontender.  No masses. 

EXTREMITIES:  Positive for edema.  Left leg erythema.



MEDICATIONS:  Medications of January 10, 2019, was reviewed.



LABORATORY DATA:  Laboratories of January 10, 2019, white count 30.2, hemoglobin

11.2. 



On January 10, 2019, sodium 130, potassium 5.4, chloride 105, carbon dioxide 10, BUN

66, creatinine 3.52, glucose 300, calcium 7.4. 



ASSESSMENT AND PLAN:  

1. Acute kidney injury, secondary to presumed hemodynamically-mediated renal

dysfunction.  Creatinine has worsen from yesterday's value of 3.27, to a most recent

value of 3.52.  He is noted also to have severe with significant metabolic acidosis,

which would be also secondary to multifactorial etiology, which includes lactic

acidosis as well as renal failure.  We are trying to hold off dialysis.  If the

renal function will further worsen in a.m., we will probably consider initiating

dialysis with this patient.  For the moment, he is adequately making urine output.

In addition, he has only a mild hyperkalemia.  The metabolic acidosis is being

addressed with p.r.n. sodium bicarbonate.  I have started him back on salt poor

albumin 25 g IV q.6. 

2. Sepsis/necrotizing fascitis, on IV antibiotics.  ID is following.

3. So far, blood culture shows no growth to date.

4. Overall, agree with current management.







Job ID:  [id number]

## 2019-01-10 NOTE — PRG
DATE OF SERVICE:  01/10/2019



SUBJECTIVE:  Mr. Bojorquez is in the ICU.  He is intubated and sedated.



OBJECTIVE:  VITAL SIGNS:  Show normal temperature.  Other vital signs, he is still

on a little bit of vasopressors, being titrated down.  His /51 and heart rate

92, and O2 saturations are 95%. 

EXTREMITIES:  The left leg is better.  The temperature is warmer and appears more

supple.  Still with those areas of hemorrhagic blisters, but overall clear-cut

improvement in the appearance of the leg.  He is moving around. 

LUNGS:  Somewhat coarse breath sounds. 

HEART:  S1, S2.  Regular rate. 

ABDOMEN:  Mildly distended.



LABORATORY DATA:  White cell count is up to 30,000, hemoglobin 11, platelets 160,

which is improved.  The bands are down to 24%.  Chemistry shows creatinine, which is

3.52.  Urine output is good.  Chest x-ray with no pneumothorax.  Left lung base

opacity. 



ASSESSMENT:  Type 2 diabetes with aggressive Strep pyogenes infection with

bacteremia and now with evidence of improvement in the left lower extremity with the

risk of amputation somewhat receding now. 







Job ID:  [id number]

## 2019-01-10 NOTE — PDOC.PN
- Subjective


Encounter Start Date: 01/10/19


Encounter Start Time: 07:43


Subjective: intubated, confused, restless





- Objective


Resuscitation Status - Order Detail:





01/07/19 04:33


Resuscitation Status Routine 


   Resuscitation Status: FULL: Full Resuscitation








MAR Reviewed: Yes


Vital Signs & Weight: 


 Vital Signs (12 hours)











  Temp Pulse Resp BP Pulse Ox


 


 01/10/19 07:20   79   147/55 H 


 


 01/10/19 07:00  98.2 F    


 


 01/10/19 06:00    20  


 


 01/10/19 04:00    20  


 


 01/10/19 03:00  98.2 F    


 


 01/10/19 02:00    20  


 


 01/10/19 00:00    20  


 


 01/09/19 23:22   84  19   98


 


 01/09/19 23:00  99.1 F    


 


 01/09/19 22:00    20  


 


 01/09/19 20:00    20  








 Weight











Admit Weight                   249 lb


 


Weight                         249 lb 1.957 oz











 Most Recent Monitor Data











Heart Rate from ECG            76


 


NIBP                           150/54


 


NIBP BP-Mean                   86


 


Respiration from ECG           23


 


SpO2                           96














I&O: 


 











 01/09/19 01/10/19 01/11/19





 06:59 06:59 06:59


 


Intake Total 4454.1 4575.4 


 


Output Total 1515 2545 160


 


Balance 2939.1 2030.4 -160











Result Diagrams: 


 01/10/19 06:14





 01/10/19 06:14


Additional Labs: 


 Accuchecks











  01/10/19 01/09/19 01/09/19





  03:11 23:06 19:15


 


POC Glucose  240 H  220 H  211 H














  01/09/19 01/09/19 01/09/19





  16:08 11:53 10:12


 


POC Glucose  208 H  191 H  214 H














  01/09/19 01/09/19 01/09/19





  09:31 08:28 07:37


 


POC Glucose  196 H  194 H  194 H











Radiology Reviewed by me: Yes (ET tube, scattered infiltrates)





Phys Exam





- Physical Examination


Neck: no JVD


scatterd rhonchi


Cardiovascular: RRR, no significant murmur


Gastrointestinal: soft, positive bowel sounds


distended


tense swelling left leg with purpura, bullae





Dx/Plan


(1) Septic shock


Code(s): A41.9 - SEPSIS, UNSPECIFIED ORGANISM; R65.21 - SEVERE SEPSIS WITH 

SEPTIC SHOCK   Status: Acute   





(2) Acute renal failure


Status: Acute   


Qualifiers: 


   Acute renal failure type: with acute tubular necrosis   Qualified Code(s): 

N17.0 - Acute kidney failure with tubular necrosis   





(3) Abscess of left thigh


Code(s): L02.416 - CUTANEOUS ABSCESS OF LEFT LOWER LIMB   Status: Acute   





(4) CAD (coronary artery disease)


Code(s): I25.10 - ATHSCL HEART DISEASE OF NATIVE CORONARY ARTERY W/O ANG PCTRS 

  Status: Acute   


Qualifiers: 


   Coronary Disease-Associated Artery/Lesion type: native artery   Native vs. 

transplanted heart: native heart   Associated angina: without angina   

Qualified Code(s): I25.10 - Atherosclerotic heart disease of native coronary 

artery without angina pectoris   





(5) HTN (hypertension)


Code(s): I10 - ESSENTIAL (PRIMARY) HYPERTENSION   Status: Acute   


Qualifiers: 


   Hypertension type: essential hypertension   Qualified Code(s): I10 - 

Essential (primary) hypertension   





(6) DM type 2 (diabetes mellitus, type 2)


Status: Acute   


Qualifiers: 


   Diabetes mellitus long term insulin use: with long term use   Diabetes 

mellitus complication status: with kidney complications 





- Plan


on iv pressors


-: vent dependent


-: on iv antibx


-: still at risk for limb loss


-: on iv amiodarone for Atrial fib





* .

## 2019-01-10 NOTE — PRG
DATE OF SERVICE:  01/10/2019



SUBJECTIVE:  Mr. Bojorquez is a 68-year-old man with history of strep infection with

septicemia and bullous left lower extremity cellulitis. 



The patient remains on mechanical ventilator support. 



Blood pressure is improving with non-escalating dose of norepinephrine. 



Urinary output is adequate.



OBJECTIVE:  VITAL SIGNS:  Today include blood pressure 120/52, pulse 94, respiratory

rate is 20, temperature is 98.2 degrees Fahrenheit, and oxygen saturation is 96% on

FiO2 of 40%. 

HEART:  Reveals regular rate and rhythm. 

LUNGS:  Clear to auscultation bilaterally.  Breathing regular and nonlabored. 

ABDOMEN:  Soft, nontender, and nondistended. 

EXTREMITIES:  Revealed 2+ radial and pedal pulses bilaterally. 

Left thigh and calf much softer today. 

The bullous lesions to the left lower extremity resolving. 

Capillary refill remains less than 2 seconds in all extremities. 

NEUROLOGIC:  Reveals no focal deficits present.



LABORATORY DATA:  Laboratory findings include CBC with 30,200 white blood cells,

hemoglobin and hematocrit are 11.2 and 34.5 respectively. 



Platelet count is 162,000. 



Differential count is as follows; 65% segmented neutrophils, 24% bands, 4

lymphocytes, and 2 monocytes. 



Metabolic profile; sodium 130, potassium is 5.4, chloride is 105, bicarb is 10, BUN

66, creatinine is 3.52, glucose is 300. 



IMPRESSION:  

1. Left lower extremity bullous cellulitis, resolving.

2. Streptococcus pyogenes septicemia, resolving.

3. Septic shock, resolving.



PLAN:  

1. There remains no acute surgical indication for this patient at this time.

2. Recommend weaning off vasopressor support as tolerated.







Job ID:  [id number]

## 2019-01-10 NOTE — RAD
CHEST 1 VIEW:

 

INDICATION: 

History of intubation.

 

COMPARISON: 

Prior exam dated 1/9/2019.

 

FINDINGS: 

Left lung base opacity persists.  Pulmonary vascular congestion and perihilar edema is similar-appear
ing.  ET tube, gastric catheter, and right IJ central venous catheter is unchanged.  Tiny bilateral p
leural effusions remain.  No pneumothorax is evident.

 

IMPRESSION: 

Stable exam.  No pneumothorax.

 

POS: [facility]

## 2019-01-10 NOTE — PRG
DATE OF SERVICE:  



SUBJECTIVE:  A 68-year-old gentleman, morbidly obese, remains intubated on the vent.



OBJECTIVE:  VITAL SIGNS:  His blood pressure is 147/57, temperature 98, respirations

of 20, sats are 95%. 

GENERAL:  He opens his eyes, does move all four extremities.  His left leg is still

markedly swollen. 

CHEST:  Decreased breath sounds and wheezing. CARDIAC:  Sinus tach. 

ABDOMEN:  Distended and soft.



DIAGNOSTIC DATA:  White count is 30,000, H and H 11 and 34, platelet count is

normal.  PO2 is 107, pCO2 of 33%, pH 7.14, at the rate of 20, 40%.  His bicarb is

10.  Potassium is 5.4, sodium 130, BUN and creatinine 66 and 3.5. 



IMPRESSION:  

1. Sepsis syndrome.

2. Cellulitis.

3. Diabetes.

4. Renal failure.

5. Severe metabolic acidosis.

6. Encephalopathy.



PLAN:  He is not weanable.  Continue nutrition.  Start long-acting insulin.  PT

supportive care. 



This is one-half hour of critical time.







Job ID:  [id number]

## 2019-01-11 LAB
ANALYZER IN CARDIO: (no result)
ANION GAP SERPL CALC-SCNC: 16 MMOL/L (ref 10–20)
BASE EXCESS STD BLDA CALC-SCNC: -8.5 MEQ/L
BUN SERPL-MCNC: 78 MG/DL (ref 8.4–25.7)
CA-I BLDA-SCNC: 1 MMOL/L (ref 1.12–1.3)
CALCIUM SERPL-MCNC: 7.5 MG/DL (ref 7.8–10.44)
CHLORIDE SERPL-SCNC: 107 MMOL/L (ref 98–107)
CO2 SERPL-SCNC: 17 MMOL/L (ref 23–31)
CREAT CL PREDICTED SERPL C-G-VRATE: 35 ML/MIN (ref 70–130)
GLUCOSE SERPL-MCNC: 195 MG/DL (ref 80–115)
HCO3 BLDA-SCNC: 17.9 MEQ/L (ref 22–28)
HCT VFR BLDA CALC: 29 % (ref 42–52)
HGB BLD-MCNC: 9.7 G/DL (ref 14–18)
HGB BLDA-MCNC: 10 G/DL (ref 14–18)
MCH RBC QN AUTO: 31.3 PG (ref 27–31)
MCV RBC AUTO: 92.2 FL (ref 78–98)
MDIFF COMPLETE?: YES
PCO2 BLDA: 40.4 MMHG (ref 35–45)
PH BLDA: 7.27 [PH] (ref 7.35–7.45)
PLATELET # BLD AUTO: 133 THOU/UL (ref 130–400)
PO2 BLDA: 100.6 MMHG (ref 80–?)
POTASSIUM BLD-SCNC: 4.29 MMOL/L (ref 3.7–5.3)
POTASSIUM SERPL-SCNC: 4.4 MMOL/L (ref 3.5–5.1)
RBC # BLD AUTO: 3.1 MILL/UL (ref 4.7–6.1)
SODIUM SERPL-SCNC: 136 MMOL/L (ref 136–145)
SPECIMEN DRAWN FROM PATIENT: (no result)
WBC # BLD AUTO: 20 THOU/UL (ref 4.8–10.8)

## 2019-01-11 RX ADMIN — Medication SCH ML: at 20:30

## 2019-01-11 RX ADMIN — CLINDAMYCIN PHOSPHATE SCH MLS: 900 INJECTION, SOLUTION INTRAVENOUS at 16:58

## 2019-01-11 RX ADMIN — HYDROCORTISONE SODIUM SUCCINATE SCH MG: 100 INJECTION, POWDER, FOR SOLUTION INTRAMUSCULAR; INTRAVENOUS at 00:33

## 2019-01-11 RX ADMIN — INSULIN GLARGINE SCH MLS: 100 INJECTION, SOLUTION SUBCUTANEOUS at 20:30

## 2019-01-11 RX ADMIN — INSULIN HUMAN PRN UNITS: 100 INJECTION, SOLUTION PARENTERAL at 00:37

## 2019-01-11 RX ADMIN — ALBUMIN HUMAN SCH GM: 250 SOLUTION INTRAVENOUS at 15:39

## 2019-01-11 RX ADMIN — CLINDAMYCIN PHOSPHATE SCH MLS: 900 INJECTION, SOLUTION INTRAVENOUS at 00:33

## 2019-01-11 RX ADMIN — MAGNESIUM HYDROXIDE PRN ML: 400 SUSPENSION ORAL at 11:42

## 2019-01-11 RX ADMIN — SILVER SULFADIAZINE SCH APPLIC: 10 CREAM TOPICAL at 20:31

## 2019-01-11 RX ADMIN — INSULIN HUMAN PRN UNITS: 100 INJECTION, SOLUTION PARENTERAL at 15:35

## 2019-01-11 RX ADMIN — HYDROCORTISONE SODIUM SUCCINATE SCH MG: 100 INJECTION, POWDER, FOR SOLUTION INTRAMUSCULAR; INTRAVENOUS at 23:48

## 2019-01-11 RX ADMIN — CLINDAMYCIN PHOSPHATE SCH MLS: 900 INJECTION, SOLUTION INTRAVENOUS at 08:25

## 2019-01-11 RX ADMIN — HYDROCORTISONE SODIUM SUCCINATE SCH MG: 100 INJECTION, POWDER, FOR SOLUTION INTRAMUSCULAR; INTRAVENOUS at 17:29

## 2019-01-11 RX ADMIN — ALBUMIN HUMAN SCH GM: 250 SOLUTION INTRAVENOUS at 09:26

## 2019-01-11 RX ADMIN — PENICILLIN G POTASSIUM SCH MLS: 5000000 POWDER, FOR SOLUTION INTRAMUSCULAR; INTRAPLEURAL; INTRATHECAL; INTRAVENOUS at 21:56

## 2019-01-11 RX ADMIN — INSULIN HUMAN PRN UNITS: 100 INJECTION, SOLUTION PARENTERAL at 20:32

## 2019-01-11 RX ADMIN — PENICILLIN G POTASSIUM SCH MLS: 5000000 POWDER, FOR SOLUTION INTRAMUSCULAR; INTRAPLEURAL; INTRATHECAL; INTRAVENOUS at 13:26

## 2019-01-11 RX ADMIN — INSULIN HUMAN PRN UNITS: 100 INJECTION, SOLUTION PARENTERAL at 05:03

## 2019-01-11 RX ADMIN — ALBUMIN HUMAN SCH GM: 250 SOLUTION INTRAVENOUS at 04:43

## 2019-01-11 RX ADMIN — Medication SCH ML: at 08:26

## 2019-01-11 RX ADMIN — INSULIN GLARGINE SCH MLS: 100 INJECTION, SOLUTION SUBCUTANEOUS at 09:30

## 2019-01-11 RX ADMIN — HYDROCORTISONE SODIUM SUCCINATE SCH MG: 100 INJECTION, POWDER, FOR SOLUTION INTRAMUSCULAR; INTRAVENOUS at 11:41

## 2019-01-11 RX ADMIN — HYDROCORTISONE SODIUM SUCCINATE SCH MG: 100 INJECTION, POWDER, FOR SOLUTION INTRAMUSCULAR; INTRAVENOUS at 06:07

## 2019-01-11 RX ADMIN — PENICILLIN G POTASSIUM SCH MLS: 5000000 POWDER, FOR SOLUTION INTRAMUSCULAR; INTRAPLEURAL; INTRATHECAL; INTRAVENOUS at 06:07

## 2019-01-11 NOTE — PRG
DATE OF SERVICE:  01/11/2019



SUBJECTIVE:  This morning, he remains intubated in the vent, sedated, and remains

still quite agitated. 



OBJECTIVE:  VITAL SIGNS:  His blood pressure is 110/44, pulse 87, saturations 90%,

and respirations 18. 

CHEST:  Extensive rhonchi and crackles. 

CARDIAC:  Sinus tach. 

ABDOMEN:  Distended and soft.  Decreased bowel sounds.



LABORATORY DATA:  Still got left shift, 66 segs, 26 bands, white count 20,000,

platelet count is normal.  PO2 is 100, pCO2 is __________ at a rate of 60.

Creatinine 3.3, BUN is 78, glucose 185. 



IMPRESSION:  

1. Multiorgan failure and sepsis.

2. Renal failure.

3. __________ with congestive heart failure.

4. Extensive cellulitis.



PLAN:  He is not weanable at this stage.  Trial of Lasix, dulcolax, supportive care.

 Continue antibiotics.  Prognosis is guarded.  Discussed with family at the bedside.

 We will continue stress dose of steroids. 



One half hour critical time.







Job ID:  [id number]

## 2019-01-11 NOTE — PRG
DATE OF SERVICE:  01/11/2019



SUBJECTIVE:  The patient is seen and examined at the bedside.  He is feeling

significantly better.  This morning, his blood spitting is significantly improved.

His shortness of breath is significantly improved. 



OBJECTIVE:  VITAL SIGNS:  Blood pressure is 101/42, pulse is 79, respiratory rate is

20 times per minute.  O2 saturation is 96% on room air. 

HEENT:  His head is atraumatic, normocephalic.  Eyes are PERRLA.  Sclerae are

nonicteric.  Oral mucosa is moist. 

NECK:  Supple. 

LUNGS:  Breath sounds diminished at both bases with few crackles bilaterally. 

HEART:  S1, S2 normal.  No S3.  No S4. 

ABDOMEN:  Soft, obese, nontender, nondistended. 

EXTREMITIES: 





CANCELED DICTATION







Job ID:  [id number]

## 2019-01-11 NOTE — PRG
DATE OF SERVICE:  01/11/2019



SUBJECTIVE:  The patient is seen and examined at the bedside.  The daughter is

present at the bedside. 



OBJECTIVE:  GENERAL:  He is sedated and intubated orally. 

HEENT:  His pupils respond to light.  Sclerae are nonicteric.  Conjunctivae are

pinkish. 

LUNGS:  Breath sounds diminished at both bases. 

HEART:  S1 and S2, somewhat irregular.  No S3.  No S4. 

ABDOMEN:  Soft.  Distended mildly.  Bowel sounds are sluggish. 

EXTREMITIES:  Peripheral edema present, 1 to 2+ on __________ extremities.  The left

groin is dressed.  The left lower extremity with open bullae. 

NEUROLOGICAL:  Postponed since he is sedated.



LABORATORY DATA:  White count of 20,000, hemoglobin 9.7, hematocrit 28.6, and

platelet count 133,000.  ABGs; pH of 7.27, pCO2 of 40.4, pO2 of 100.6, base excess

is 29.0.  A-a gradient 276.  Sodium of 136, potassium 4.4, chloride 107, CO2 of 17,

BUN 78, creatinine 3.3, and glucose 195, __________ is ranging from 153 to 278. 



IMAGING DATA:  Chest x-ray this morning showed no new findings.  Mixed

alveolar/interstitial opacities unchanged. 



IMPRESSION:  

1. Septic shock.

2. Acute renal failure.

3. Abscess of the left thigh.

4. Coronary artery disease.

5. Hypertension.

6. Diabetes mellitus.



PLAN:  The plan is to continue critical care per Pulmonology/Critical Care Service.

Increase the dose of his metoclopramide since he was not able to tolerate any

feeding. 

Also, he received Lasix for his swelling.  We will continue his antibiotics, which

is doxycycline and penicillin G.  We will continue full dose of Lovenox and

amiodarone drip 

along with albumins.  We will continue his insulin glargine and the dose will be

increased from 20 to 25 units. 







Job ID:  [id number]

## 2019-01-11 NOTE — PQF
DATE:     1-14-19                                                ATTN:  DR. DARIUS NOBLES / DR. LIVIER WHITE



Please exercise your independent, professional judgment in responding to the 
clarification form. 

Clinical indicators are provided on the bottom of this form for your review



Please check appropriate box(s):



[  ] Acute Respiratory Failure:                     [  ] with Hypoxia[  ] with 
Hypercapnia

[  ] Acute Respiratory Failure due to: (etiology) ______________________ 

[  ] Other diagnosis ___________

[  ] Unable to determine



In addition, please specify:

Present on Admission (POA):  [  ] Yes             [  ] No             [  ] 
Unable to determine



For continuity of documentation, please document condition throughout progress 
notes and discharge summary.  Thank You.



CLINICAL INDICATORS - SIGNS / SYMPTOMS / LABS



H&P:   INCREASING SHORTNESS OF BREATH, WORSENING IN THE LAST 48 HRS,  O2 SAT 98
% ON 2LNC, 

          DIMINISHED BREATH SOUNDS IN THE BASES BILATERALLY



CONSULT DR. GUIDRY 1-7-19:  COPD



CONSULT DR. GUIDRY 1-11-19:   REMAINS INTUBATED IN THE VENT



CXR 11-11-19:   HISTORY:  VENTILATED PATIENT WITH RESP FAILURE



CONSULT NOTE DR. GUIDRY 1-11-19:   MULTIORGAN FAILURE AND SEPSIS,  HE IS NOT 
WEANABLE AT THIS STAGE



RISK FACTOR:  H&P:   HX OF SMOKING

                         CONSULT DR. GUIDRY 1-7-19:  COPD

                          PROCEDURE 1-8-19:  I&D LEFT GROIN,  INSERTION OF 
HEMODIALYSIS CATH L GROIN

                         VENT  STATUS:  SIMV 1-8-19 TO PRESENT

                              

TREATMENTS:   VENT  STATUS:  SIMV 1-8-19 TO PRESENT

                          MAR:   DUONEBS 1-7-19,  LASIX IVP 1-11-19

                          SERIAL CXR'S

                          PULMONARY CONSULT 1-7-19



(This form is maintained as a part of the permanent medical record)

 2015 [employer].  All Rights Reserved

MALACHI [email or web address]    Office:  [phone]

                                                              

[facility]

## 2019-01-11 NOTE — RAD
SINGLE VIEW OF THE CHEST:

 

Comparison: 1-10-19

 

History: Ventilated patient with respiratory failure. 

 

FINDINGS: 

Single view of the chest shows an enlarged but stable cardiomediastinal silhouette. The lines and tub
es are unchanged in position. There are diffuse mixed alveolar/interstitial opacities, unchanged. 

 

IMPRESSION: 

Stable exam. 

 

POS: [facility]

## 2019-01-11 NOTE — PRG
DATE OF SERVICE:  01/11/2019



RENAL MEDICINE



SUBJECTIVE:  Mr. Bojroquez is a 68-year-old white male, who was seen for his acute

kidney injury secondary to a presumed prerenal azotemia.  Volume depletion has 
been

given with this patient in addition, he has been placed on pressor support.  He

was admitted for sepsis secondary to a possible necrotizing fasciitis.  Doing 
stable

for the last 24 hours.  Renal function is holding steady. 



OBJECTIVE:  VITAL SIGNS:  Blood pressure is 101/42, heart rate 79, respiratory 
rate

is 12, and pulse ox 93%. 

GENERAL:  The patient is sedated, intubated on ventilator support. 

SKIN:  Adequate turgor. 

HEENT:  Slightly pale conjunctivae.  Anicteric sclerae.  No neck mass.  No 
carotid

bruits.  No JVD. 

CHEST:  No deformities. 

LUNGS:  Decreased breath sounds. 

HEART:  Normal sinus rhythm.  No murmurs, no gallops, no rubs. 

ABDOMEN:  Globular, soft, nontender.  No masses. 

EXTREMITIES:  Positive for edema.  Positive for erythema and bullous lesions of 
the

left leg. 



MEDICATIONS:  Medications of January 11, 2019 was reviewed.



LABORATORY DATA:  Laboratories of January 11, 2019; sodium 136, potassium 4.4,

chloride 107, carbon dioxide 17, BUN 78, creatinine 3.31, glucose 195, calcium 
7.5.

White count 20, hemoglobin 9.7. 



ASSESSMENT AND PLAN:  

1. Acute kidney injury-initially felt to be a hemodynamically-mediated renal

dysfunction.  However, in spite of colloids and crystalloids, renal function 
has not

dramatically improved.  A superimposed acute tubular necrosis is a possibility 
with 

this patient.  So far, the patient is diuresing.  His potassium is within 
normal and

he is not in volume overload.  We will continue to hold off any dialytic

intervention. 

2. Sepsis syndrome/necrotizing fasciitis, on IV antibiotics.  ID is following.

3. Agree with current management.







Job ID:  [id number]



[facility]

## 2019-01-11 NOTE — PQF
DATE:     1-14-19                                     ATTN:  LUISA NOBLES 
/ DR. LIVIER WHITE / DR. QUIRINO MAY



Please exercise your independent, professional judgment in responding to the 
clarification form. 

Clinical indicators are provided on the bottom of this form for your review



Please check appropriate box(s):



[ x ] Encephalopathy:

     Type:       [ x ] Acute       [  ] Subacute       [  ] Chronic

     Etiology:     [ x ] Metabolic      [  ] Toxic       

                               [  ] Hypoxic         [  ] Septic

                     [  ] Other (please specify) 

[  ] Transient Alteration of Awareness 

[  ] Other diagnosis ___________

[  ] Unable to determine



In addition, please specify:

Present on Admission (POA):  [ x ] Yes             [  ] No             [  ] 
Unable to determine



For continuity of documentation, please document condition throughout progress 
notes and discharge summary.  Thank You.



CLINICAL INDICATORS - SIGNS / SYMPTOMS / LABS



CONSULT NOTE DR. AMIN 1-8-19:   REMAINS IN ICU, CONFUSED AND AGITATED,  
ENCEPHALOPATHY



PN DR. NOBLES 1-8-19:   CONFUSED



CONSULT NOTE DR. GUIDRY 1-10-19:  SEPSIS SYNDROME,  SEVERE METABOLIC ACIDOSIS,  
ENCEPHALOPATHY





RISK FACTORS: 

      CONSULT NOTE DR. GUIDRY 1-10-19:   SEPSIS SYNDROME,  SEVERE METABOLIC 
ACIDOSIS,  ENCEPHALOPATHY

      H&P:   SEPTIC SHOCK,  DKA, L GROIN ABSCESS, DM 2,  CLARENCE,  



TREATMENTS:    VENT STATUS 1-8-19 TO PRESENT SIMV

                           MAR:   SODIUM BICARBONATE IVF,  NS IVF,  PENICILLIN 
G IV,  CLEOCIN IV,  LANTUS SC



(This form is maintained as a part of the permanent medical record)

 2015 [email or web address]  All Rights Reserved

[email or web address]    Office:  [phone]



[place]

## 2019-01-11 NOTE — PRG
DATE OF SERVICE:  01/11/2019



SUBJECTIVE:  Mr. Bojorquez is a 68-year-old man with group A strep septicemia. 



He also has the left lower extremity bullous cellulitis, which is stable.



OBJECTIVE:  VITAL SIGNS:  Today include blood pressure 124/44, pulse 79, respiratory

rate 20, temperature is 98.7 degrees Fahrenheit, and oxygen saturation is 97%. 

EXTREMITIES:  Left lower extremity swelling is markedly improved.  There is no

progressive bullous formation.  The patient has 2+ radial and pedal pulses

bilaterally. 



The left groin wound is stable and no erythema or purulence present.



IMPRESSION:  Stable left lower extremity bullous cellulitis secondary to group A

strep infection. 



PLAN:  

1. Continue current antibiotic regimen.

2. There is no acute surgical indication for this patient at this time.







Job ID:  [id number]

## 2019-01-12 LAB
ANALYZER IN CARDIO: (no result)
ANION GAP SERPL CALC-SCNC: 17 MMOL/L (ref 10–20)
BASE EXCESS STD BLDA CALC-SCNC: -5.4 MEQ/L
BUN SERPL-MCNC: 88 MG/DL (ref 8.4–25.7)
CA-I BLDA-SCNC: 1.03 MMOL/L (ref 1.12–1.3)
CALCIUM SERPL-MCNC: 7.7 MG/DL (ref 7.8–10.44)
CHLORIDE SERPL-SCNC: 105 MMOL/L (ref 98–107)
CO2 SERPL-SCNC: 18 MMOL/L (ref 23–31)
CREAT CL PREDICTED SERPL C-G-VRATE: 36 ML/MIN (ref 70–130)
GLUCOSE SERPL-MCNC: 217 MG/DL (ref 80–115)
HCO3 BLDA-SCNC: 20.5 MEQ/L (ref 22–28)
HCT VFR BLDA CALC: 29 % (ref 42–52)
HGB BLD-MCNC: 9.3 G/DL (ref 14–18)
HGB BLDA-MCNC: 10 G/DL (ref 14–18)
MCH RBC QN AUTO: 31.4 PG (ref 27–31)
MCV RBC AUTO: 91.7 FL (ref 78–98)
MDIFF COMPLETE?: YES
PCO2 BLDA: 41.6 MMHG (ref 35–45)
PH BLDA: 7.31 [PH] (ref 7.35–7.45)
PLATELET # BLD AUTO: 150 THOU/UL (ref 130–400)
PO2 BLDA: 98.7 MMHG (ref 80–?)
POTASSIUM BLD-SCNC: 4.27 MMOL/L (ref 3.7–5.3)
POTASSIUM SERPL-SCNC: 4.3 MMOL/L (ref 3.5–5.1)
RBC # BLD AUTO: 2.98 MILL/UL (ref 4.7–6.1)
SODIUM SERPL-SCNC: 136 MMOL/L (ref 136–145)
SPECIMEN DRAWN FROM PATIENT: (no result)
WBC # BLD AUTO: 18.4 THOU/UL (ref 4.8–10.8)

## 2019-01-12 RX ADMIN — CLINDAMYCIN PHOSPHATE SCH MLS: 900 INJECTION, SOLUTION INTRAVENOUS at 00:07

## 2019-01-12 RX ADMIN — HYDROCORTISONE SODIUM SUCCINATE SCH MG: 100 INJECTION, POWDER, FOR SOLUTION INTRAMUSCULAR; INTRAVENOUS at 11:31

## 2019-01-12 RX ADMIN — HYDROCORTISONE SODIUM SUCCINATE SCH MG: 100 INJECTION, POWDER, FOR SOLUTION INTRAMUSCULAR; INTRAVENOUS at 05:13

## 2019-01-12 RX ADMIN — INSULIN GLARGINE SCH: 100 INJECTION, SOLUTION SUBCUTANEOUS at 19:31

## 2019-01-12 RX ADMIN — MAGNESIUM HYDROXIDE PRN ML: 400 SUSPENSION ORAL at 08:32

## 2019-01-12 RX ADMIN — INSULIN HUMAN PRN UNITS: 100 INJECTION, SOLUTION PARENTERAL at 16:48

## 2019-01-12 RX ADMIN — INSULIN HUMAN PRN UNITS: 100 INJECTION, SOLUTION PARENTERAL at 04:15

## 2019-01-12 RX ADMIN — Medication SCH ML: at 19:27

## 2019-01-12 RX ADMIN — PENICILLIN G POTASSIUM SCH MLS: 5000000 POWDER, FOR SOLUTION INTRAMUSCULAR; INTRAPLEURAL; INTRATHECAL; INTRAVENOUS at 05:17

## 2019-01-12 RX ADMIN — HYDROCORTISONE SODIUM SUCCINATE SCH MG: 100 INJECTION, POWDER, FOR SOLUTION INTRAMUSCULAR; INTRAVENOUS at 18:05

## 2019-01-12 RX ADMIN — INSULIN GLARGINE SCH MLS: 100 INJECTION, SOLUTION SUBCUTANEOUS at 08:14

## 2019-01-12 RX ADMIN — CLINDAMYCIN PHOSPHATE SCH MLS: 900 INJECTION, SOLUTION INTRAVENOUS at 16:42

## 2019-01-12 RX ADMIN — INSULIN HUMAN PRN UNITS: 100 INJECTION, SOLUTION PARENTERAL at 11:35

## 2019-01-12 RX ADMIN — INSULIN HUMAN PRN UNITS: 100 INJECTION, SOLUTION PARENTERAL at 08:12

## 2019-01-12 RX ADMIN — INSULIN HUMAN PRN UNITS: 100 INJECTION, SOLUTION PARENTERAL at 19:25

## 2019-01-12 RX ADMIN — PENICILLIN G POTASSIUM SCH MLS: 5000000 POWDER, FOR SOLUTION INTRAMUSCULAR; INTRAPLEURAL; INTRATHECAL; INTRAVENOUS at 13:49

## 2019-01-12 RX ADMIN — INSULIN HUMAN PRN UNITS: 100 INJECTION, SOLUTION PARENTERAL at 23:05

## 2019-01-12 RX ADMIN — FENTANYL CITRATE SCH MLS: 50 INJECTION, SOLUTION INTRAMUSCULAR; INTRAVENOUS at 14:06

## 2019-01-12 RX ADMIN — Medication SCH ML: at 08:17

## 2019-01-12 RX ADMIN — SILVER SULFADIAZINE SCH APPLIC: 10 CREAM TOPICAL at 10:05

## 2019-01-12 RX ADMIN — PENICILLIN G POTASSIUM SCH MLS: 5000000 POWDER, FOR SOLUTION INTRAMUSCULAR; INTRAPLEURAL; INTRATHECAL; INTRAVENOUS at 21:01

## 2019-01-12 RX ADMIN — HYDROCORTISONE SODIUM SUCCINATE SCH MG: 100 INJECTION, POWDER, FOR SOLUTION INTRAMUSCULAR; INTRAVENOUS at 23:05

## 2019-01-12 RX ADMIN — SILVER SULFADIAZINE SCH APPLIC: 10 CREAM TOPICAL at 19:27

## 2019-01-12 RX ADMIN — INSULIN HUMAN PRN UNITS: 100 INJECTION, SOLUTION PARENTERAL at 00:04

## 2019-01-12 RX ADMIN — FENTANYL CITRATE SCH: 50 INJECTION, SOLUTION INTRAMUSCULAR; INTRAVENOUS at 10:24

## 2019-01-12 RX ADMIN — CLINDAMYCIN PHOSPHATE SCH MLS: 900 INJECTION, SOLUTION INTRAVENOUS at 08:18

## 2019-01-12 RX ADMIN — INSULIN GLARGINE SCH MLS: 100 INJECTION, SOLUTION SUBCUTANEOUS at 19:25

## 2019-01-12 NOTE — RAD
PORTABLE UPRIGHT FRONTAL CHEST RADIOGRAPH:

 

DATE: 1/12/2019.

 

COMPARISON: 

1/11/2019.

 

HISTORY: 

Respiratory failure, intubated patient.

 

FINDINGS: 

Endotracheal tube in place, terminating at the level of the clavicles.  Nasogastric tube is faintly v
isualized and appears to extend into the left upper quadrant.  Stable right vascular catheter.  Stabl
e nonspecific diffuse interstitial opacity with pulmonary vascular congestion and hazy ground-glass o
pacity bilaterally.

 

IMPRESSION: 

No significant interval change.

 

POS: [facility]

## 2019-01-12 NOTE — PRG
DATE OF SERVICE:  01/12/2019



SUBJECTIVE:  He remains intubated in the vent, sedated. 



He still remains encephalopathic.



OBJECTIVE:  VITAL SIGNS:  Blood pressure 130/54, pulse 91, respirations 20, sats are

98% to 99%.  His urine output is improved 4559 in, 2810 out. 

CHEST:  Bilateral rhonchi. 

CARDIAC:  Normal S1, S2.  No gallops. 

ABDOMEN:  No masses. 

NEUROLOGICAL:  He is sedated. 

EXTREMITIES:  His left leg is still somewhat swollen.



LABORATORY DATA:  White count 18,000, H and H are 9 and 27, platelet count 150.  PO2

is 98, pCO2 is 41, pH is 7.31, on 50%, rate of 20.  Creatinine is 3.1, BUN 88. 



IMPRESSION:  

1. Streptococcus sepsis, left leg.

2. Respiratory failure.

3. Congestive heart failure.

4. Renal failure.



PLAN:  Continue nutrition, PT, supportive care.  Still not weanable. 



One-half hour of critical time.







Job ID:  [id number]

## 2019-01-12 NOTE — PRG
DATE OF SERVICE:  01/12/2019



SUBJECTIVE:  The patient is seen and examined at the bedside.  He is off

vasopressors at this point.  He started on a tube feeding at 20 mL/h.  Apparently,

he had bowel movement and he started tolerating the feeding better.  He is on

amiodarone drip.  He is on bicarb drip. 



OBJECTIVE:  VITAL SIGNS:  Blood pressure is 168/73, pulse is 79, respiratory rate is

20.  He is intubated orally and mechanically ventilated.  Temperature today is 98.6. 

GENERAL:  He is awake at this point.  He is trying to fight the ventilator and wrist

restraints. 

LUNGS:  His breath sounds diminished at both bases. 

HEART:  S1, S2 normal.  No S3.  No S4. 

ABDOMEN:  Distended, obese.  Bowel sounds are sluggish. 

EXTREMITIES:  The left lower extremity is wrapped.  The swelling of the right lower

extremity is significantly improved since yesterday. 

HEENT:  His pupils are responding to light properly. 

NEURO EXAM:  Postponed.



LABORATORY DATA:  White count of 18.4, hemoglobin 9.3, hematocrit 27.3, platelet

count 150,000, 85% of neutrophils, 10 bands.  ABGs; pH of 7.31, pCO2 41.6, PO2 98.7.

Base excess is -5.4.  Sodium of 136, potassium 4.3, chloride 105, CO2 of 18, BUN 88,

creatinine 3.19, glycemia is ranging from 178 to 206, calcium 7.7.  Microbiology, no

new findings.  The last blood culture came back negative.  Dipstick negative for 48

hours.  The left groin wound reveals growth of Streptococcus pyogenes. 



IMPRESSION:  

1. Septic shock.

2. Acute renal failure.

3. Abscess of the left thigh with positive culture of Streptococcus pyogenes.

4. Coronary artery disease.

5. Respiratory failure, currently on mechanical ventilation.

6. Hypotension, requiring vasopressors.

7. History of hypertension.

8. Diabetes mellitus.



PLAN:  Plan is to continue his IV penicillin plus clindamycin.  He will continue on

his mechanical ventilation.  We will continue on metoclopramide.  He will continue

on amiodarone drip.  He will continue on hydrocortisone 25 mg IV push every 6 hours.

 He was started on insulin glargine 20 units twice a day yesterday.  If his glycemia

is not any better by tomorrow, we will have to go up on the dose of insulin glargine

to 25 units twice a day.  We will try to increase the feeding rate as tolerated.  He

will stay in the Critical Care Unit. 







Job ID:  [id number]

## 2019-01-13 LAB
ANALYZER IN CARDIO: (no result)
ANION GAP SERPL CALC-SCNC: 16 MMOL/L (ref 10–20)
BASE EXCESS STD BLDA CALC-SCNC: -2.7 MEQ/L
BUN SERPL-MCNC: 101 MG/DL (ref 8.4–25.7)
CA-I BLDA-SCNC: 1.07 MMOL/L (ref 1.12–1.3)
CALCIUM SERPL-MCNC: 7.9 MG/DL (ref 7.8–10.44)
CHLORIDE SERPL-SCNC: 106 MMOL/L (ref 98–107)
CO2 SERPL-SCNC: 24 MMOL/L (ref 23–31)
CREAT CL PREDICTED SERPL C-G-VRATE: 37 ML/MIN (ref 70–130)
GLUCOSE SERPL-MCNC: 161 MG/DL (ref 80–115)
HCO3 BLDA-SCNC: 22.5 MEQ/L (ref 22–28)
HCT VFR BLDA CALC: 31 % (ref 42–52)
HGB BLD-MCNC: 8.9 G/DL (ref 14–18)
HGB BLDA-MCNC: 10.4 G/DL (ref 14–18)
MCH RBC QN AUTO: 31.5 PG (ref 27–31)
MCV RBC AUTO: 91.2 FL (ref 78–98)
MDIFF COMPLETE?: YES
PCO2 BLDA: 40.6 MMHG (ref 35–45)
PH BLDA: 7.36 [PH] (ref 7.35–7.45)
PLATELET # BLD AUTO: 168 THOU/UL (ref 130–400)
PO2 BLDA: 91.9 MMHG (ref 80–?)
POTASSIUM BLD-SCNC: 4.6 MMOL/L (ref 3.7–5.3)
POTASSIUM SERPL-SCNC: 4.6 MMOL/L (ref 3.5–5.1)
RBC # BLD AUTO: 2.81 MILL/UL (ref 4.7–6.1)
SODIUM SERPL-SCNC: 141 MMOL/L (ref 136–145)
SPECIMEN DRAWN FROM PATIENT: (no result)
WBC # BLD AUTO: 17.6 THOU/UL (ref 4.8–10.8)

## 2019-01-13 RX ADMIN — Medication SCH ML: at 20:22

## 2019-01-13 RX ADMIN — HYDROCORTISONE SODIUM SUCCINATE SCH MG: 100 INJECTION, POWDER, FOR SOLUTION INTRAMUSCULAR; INTRAVENOUS at 23:52

## 2019-01-13 RX ADMIN — INSULIN GLARGINE SCH MLS: 100 INJECTION, SOLUTION SUBCUTANEOUS at 09:44

## 2019-01-13 RX ADMIN — HYDROCORTISONE SODIUM SUCCINATE SCH MG: 100 INJECTION, POWDER, FOR SOLUTION INTRAMUSCULAR; INTRAVENOUS at 17:03

## 2019-01-13 RX ADMIN — INSULIN HUMAN PRN UNITS: 100 INJECTION, SOLUTION PARENTERAL at 23:55

## 2019-01-13 RX ADMIN — SILVER SULFADIAZINE SCH APPLIC: 10 CREAM TOPICAL at 23:59

## 2019-01-13 RX ADMIN — INSULIN HUMAN PRN UNITS: 100 INJECTION, SOLUTION PARENTERAL at 03:07

## 2019-01-13 RX ADMIN — FENTANYL CITRATE SCH MLS: 50 INJECTION, SOLUTION INTRAMUSCULAR; INTRAVENOUS at 08:49

## 2019-01-13 RX ADMIN — CLINDAMYCIN PHOSPHATE SCH MLS: 900 INJECTION, SOLUTION INTRAVENOUS at 08:52

## 2019-01-13 RX ADMIN — INSULIN GLARGINE SCH MLS: 100 INJECTION, SOLUTION SUBCUTANEOUS at 20:22

## 2019-01-13 RX ADMIN — CLINDAMYCIN PHOSPHATE SCH MLS: 900 INJECTION, SOLUTION INTRAVENOUS at 16:51

## 2019-01-13 RX ADMIN — PENICILLIN G POTASSIUM SCH MLS: 5000000 POWDER, FOR SOLUTION INTRAMUSCULAR; INTRAPLEURAL; INTRATHECAL; INTRAVENOUS at 21:34

## 2019-01-13 RX ADMIN — INSULIN HUMAN PRN UNITS: 100 INJECTION, SOLUTION PARENTERAL at 12:38

## 2019-01-13 RX ADMIN — CLINDAMYCIN PHOSPHATE SCH MLS: 900 INJECTION, SOLUTION INTRAVENOUS at 23:59

## 2019-01-13 RX ADMIN — Medication SCH ML: at 08:53

## 2019-01-13 RX ADMIN — INSULIN HUMAN PRN UNITS: 100 INJECTION, SOLUTION PARENTERAL at 17:04

## 2019-01-13 RX ADMIN — PENICILLIN G POTASSIUM SCH MLS: 5000000 POWDER, FOR SOLUTION INTRAMUSCULAR; INTRAPLEURAL; INTRATHECAL; INTRAVENOUS at 05:04

## 2019-01-13 RX ADMIN — HYDROCORTISONE SODIUM SUCCINATE SCH MG: 100 INJECTION, POWDER, FOR SOLUTION INTRAMUSCULAR; INTRAVENOUS at 12:32

## 2019-01-13 RX ADMIN — PENICILLIN G POTASSIUM SCH MLS: 5000000 POWDER, FOR SOLUTION INTRAMUSCULAR; INTRAPLEURAL; INTRATHECAL; INTRAVENOUS at 14:05

## 2019-01-13 RX ADMIN — CLINDAMYCIN PHOSPHATE SCH MLS: 900 INJECTION, SOLUTION INTRAVENOUS at 00:14

## 2019-01-13 RX ADMIN — SILVER SULFADIAZINE SCH APPLIC: 10 CREAM TOPICAL at 08:53

## 2019-01-13 RX ADMIN — HYDROCORTISONE SODIUM SUCCINATE SCH MG: 100 INJECTION, POWDER, FOR SOLUTION INTRAMUSCULAR; INTRAVENOUS at 05:03

## 2019-01-13 NOTE — PRG
DATE OF SERVICE:  01/13/2019



SUBJECTIVE:  The patient remains in the critical care unit on full mechanical

ventilatory support.  The patient is being seen by service status post incision and

drainage of a thigh wound that wound is continuing to be dressed daily and appears

to be improving.  The left lower extremity swelling continues to decrease, though

his skin changes with some sloughing of the previous vesicles and blisters is more

apparent. 



IMPRESSION:  Stable left lower extremity bullous cellulitis, secondary to group A

streptococcus. 



PLAN:  

1. Continue all supportive care to include appropriate antibiotics by the primary

team. 

2. There continues to be no acute surgical indications at this time, we will

continue to check on this patient. 







Job ID:  [id number]

## 2019-01-13 NOTE — PDOC.PN
- Subjective


Encounter Start Date: 01/13/19


Encounter Start Time: 08:55


Subjective: Seen and examined still intubated





- Objective


Resuscitation Status - Order Detail:





01/07/19 04:33


Resuscitation Status Routine 


   Resuscitation Status: FULL: Full Resuscitation








Vital Signs & Weight: 


 Vital Signs (12 hours)











  Temp Pulse Resp BP


 


 01/13/19 07:29   84   174/75 H


 


 01/13/19 06:00    20 


 


 01/13/19 04:00    20 


 


 01/13/19 03:00  98.1 F   


 


 01/13/19 02:09   63  


 


 01/13/19 02:00    20 


 


 01/13/19 00:00    20 


 


 01/12/19 23:37   63  


 


 01/12/19 23:00  97.8 F   


 


 01/12/19 22:09   64  


 


 01/12/19 22:00    20 








 Weight











Admit Weight                   249 lb


 


Weight                         249 lb 1.957 oz











 Most Recent Monitor Data











Heart Rate from ECG            77


 


NIBP                           140/64


 


NIBP BP-Mean                   89


 


Respiration from ECG           23


 


SpO2                           99














I&O: 


 











 01/12/19 01/13/19 01/14/19





 06:59 06:59 06:59


 


Intake Total 3618 3982.1 


 


Output Total 2810 2345 


 


Balance 808 1637.1 











Result Diagrams: 


 01/13/19 06:15





 01/13/19 06:15


Additional Labs: 


 Accuchecks











  01/13/19 01/12/19 01/12/19





  03:08 23:05 19:23


 


POC Glucose  187 H  166 H  203 H














  01/12/19 01/12/19





  16:48 11:36


 


POC Glucose  214 H  206 H














Phys Exam





- Physical Examination


intubated


Neck: no nodes


vented sounds


Cardiovascular: no significant murmur


Gastrointestinal: soft, non-tender


Musculoskeletal: pulses present





Dx/Plan


(1) Abscess of left thigh


Code(s): L02.416 - CUTANEOUS ABSCESS OF LEFT LOWER LIMB   Status: Acute   





(2) Acute renal failure


Status: Acute   


Qualifiers: 


   Acute renal failure type: with acute tubular necrosis   Qualified Code(s): 

N17.0 - Acute kidney failure with tubular necrosis   





(3) DM type 2 (diabetes mellitus, type 2)


Status: Acute   


Qualifiers: 


   Diabetes mellitus long term insulin use: with long term use   Diabetes 

mellitus complication status: with kidney complications 





(4) HTN (hypertension)


Code(s): I10 - ESSENTIAL (PRIMARY) HYPERTENSION   Status: Acute   


Qualifiers: 


   Hypertension type: essential hypertension   Qualified Code(s): I10 - 

Essential (primary) hypertension   





(5) Septic shock


Code(s): A41.9 - SEPSIS, UNSPECIFIED ORGANISM; R65.21 - SEVERE SEPSIS WITH 

SEPTIC SHOCK   Status: Acute   





- Plan


plan discussed w/ family, continue antibiotics, , respiratory 

therapy


Vent management per the pulmonary team


-: Discontinue Biccarb gtt





* .

## 2019-01-13 NOTE — RAD
PORTABLE SEMIUPRIGHT FRONTAL CHEST RADIOGRAPH:

 

DATE: 1/13/2019.

 

COMPARISON: 

1/12/2019.

 

HISTORY: 

Respiratory failure, intubated patient.

 

FINDINGS: 

Stable endotracheal tube, nasogastric tube, and right vascular catheter.  Extensive interstitial and 
alveolar opacities noted bilaterally with a perihilar predominance, stable and nonspecific.  Mild jillian
vation of right hemidiaphragm noted, stable.

 

IMPRESSION: 

Stable appearance of the chest as detailed above.

 

[facility]: [facility]

## 2019-01-14 LAB
ANALYZER IN CARDIO: (no result)
ANION GAP SERPL CALC-SCNC: 15 MMOL/L (ref 10–20)
BASE EXCESS STD BLDA CALC-SCNC: -1.7 MEQ/L
BUN SERPL-MCNC: 101 MG/DL (ref 8.4–25.7)
CA-I BLDA-SCNC: 1.11 MMOL/L (ref 1.12–1.3)
CALCIUM SERPL-MCNC: 8.3 MG/DL (ref 7.8–10.44)
CHLORIDE SERPL-SCNC: 110 MMOL/L (ref 98–107)
CO2 SERPL-SCNC: 23 MMOL/L (ref 23–31)
CREAT CL PREDICTED SERPL C-G-VRATE: 41 ML/MIN (ref 70–130)
GLUCOSE SERPL-MCNC: 233 MG/DL (ref 80–115)
HCO3 BLDA-SCNC: 24.6 MEQ/L (ref 22–28)
HCT VFR BLDA CALC: 25 % (ref 42–52)
HGB BLD-MCNC: 9.7 G/DL (ref 14–18)
HGB BLDA-MCNC: 8.5 G/DL (ref 14–18)
MCH RBC QN AUTO: 33 PG (ref 27–31)
MCV RBC AUTO: 93.9 FL (ref 78–98)
MDIFF COMPLETE?: YES
O2 A-A PPRESDIFF RESPIRATORY: 79.72 MM[HG] (ref 0–20)
PCO2 BLDA: 49.9 MMHG (ref 35–45)
PH BLDA: 7.31 [PH] (ref 7.35–7.45)
PLATELET # BLD AUTO: 200 THOU/UL (ref 130–400)
PO2 BLDA: 143.1 MMHG (ref 80–?)
POTASSIUM BLD-SCNC: 4.98 MMOL/L (ref 3.7–5.3)
POTASSIUM SERPL-SCNC: 5 MMOL/L (ref 3.5–5.1)
RBC # BLD AUTO: 2.93 MILL/UL (ref 4.7–6.1)
SODIUM SERPL-SCNC: 143 MMOL/L (ref 136–145)
SPECIMEN DRAWN FROM PATIENT: (no result)
WBC # BLD AUTO: 16.4 THOU/UL (ref 4.8–10.8)

## 2019-01-14 RX ADMIN — INSULIN HUMAN PRN UNITS: 100 INJECTION, SOLUTION PARENTERAL at 20:34

## 2019-01-14 RX ADMIN — INSULIN GLARGINE SCH MLS: 100 INJECTION, SOLUTION SUBCUTANEOUS at 21:59

## 2019-01-14 RX ADMIN — INSULIN HUMAN PRN UNITS: 100 INJECTION, SOLUTION PARENTERAL at 04:23

## 2019-01-14 RX ADMIN — Medication SCH ML: at 21:31

## 2019-01-14 RX ADMIN — INSULIN HUMAN PRN UNITS: 100 INJECTION, SOLUTION PARENTERAL at 08:31

## 2019-01-14 RX ADMIN — INSULIN GLARGINE SCH MLS: 100 INJECTION, SOLUTION SUBCUTANEOUS at 08:33

## 2019-01-14 RX ADMIN — FENTANYL CITRATE SCH MLS: 50 INJECTION, SOLUTION INTRAMUSCULAR; INTRAVENOUS at 12:11

## 2019-01-14 RX ADMIN — INSULIN HUMAN PRN UNITS: 100 INJECTION, SOLUTION PARENTERAL at 18:01

## 2019-01-14 RX ADMIN — PENICILLIN G POTASSIUM SCH MLS: 5000000 POWDER, FOR SOLUTION INTRAMUSCULAR; INTRAPLEURAL; INTRATHECAL; INTRAVENOUS at 21:25

## 2019-01-14 RX ADMIN — SILVER SULFADIAZINE SCH APPLIC: 10 CREAM TOPICAL at 08:35

## 2019-01-14 RX ADMIN — HYDROCORTISONE SODIUM SUCCINATE SCH MG: 100 INJECTION, POWDER, FOR SOLUTION INTRAMUSCULAR; INTRAVENOUS at 05:04

## 2019-01-14 RX ADMIN — CLINDAMYCIN PHOSPHATE SCH MLS: 900 INJECTION, SOLUTION INTRAVENOUS at 08:32

## 2019-01-14 RX ADMIN — Medication SCH ML: at 08:36

## 2019-01-14 RX ADMIN — PENICILLIN G POTASSIUM SCH MLS: 5000000 POWDER, FOR SOLUTION INTRAMUSCULAR; INTRAPLEURAL; INTRATHECAL; INTRAVENOUS at 05:04

## 2019-01-14 RX ADMIN — SILVER SULFADIAZINE SCH APPLIC: 10 CREAM TOPICAL at 21:32

## 2019-01-14 RX ADMIN — PENICILLIN G POTASSIUM SCH MLS: 5000000 POWDER, FOR SOLUTION INTRAMUSCULAR; INTRAPLEURAL; INTRATHECAL; INTRAVENOUS at 14:31

## 2019-01-14 RX ADMIN — HYDROCORTISONE SODIUM SUCCINATE SCH MG: 100 INJECTION, POWDER, FOR SOLUTION INTRAMUSCULAR; INTRAVENOUS at 21:25

## 2019-01-14 RX ADMIN — CLINDAMYCIN PHOSPHATE SCH MLS: 900 INJECTION, SOLUTION INTRAVENOUS at 16:35

## 2019-01-14 NOTE — RAD
FRONTAL VIEW CHEST:

 

Date:  01/14/19 

 

COMPARISON:  

Previous day. 

 

INDICATION:

Respiratory failure. Intubated patient. 

 

FINDINGS:

Supportive lines and tubes remain. There are numerous extrinsic artifacts limiting detail. Persistent
 patchy bilateral alveolar and interstitial opacities favor edema. No significant interval change oth
erwise depicted. 

 

IMPRESSION: 

Stable findings to indicate edema. 

 

 

POS: [facility]

## 2019-01-14 NOTE — PRG
DATE OF SERVICE:  01/14/2019



SUBJECTIVE:  Danial Bojorquez is a 68-year-old morbidly obese gentleman, intubated on

the vent, still encephalopathic.  His blood sugar is 244, creatinine is 2, BUN is

100, slow improvement. 



OBJECTIVE:  VITAL SIGNS:  Blood pressure 170/78, pulse 101, saturations 90%,

respiratory rate 18. 

CHEST:  Decreased breath sounds.  No wheezing. 

CARDIAC:  Normal S1 and S2.  No gallops. 

ABDOMEN:  Soft.



LABORATORY DATA:  White count 16,000.  His pO2 is 143, pCO2 is __________.



IMPRESSION:  Respiratory failure, morbid obesity sleep apnea, supraventricular

tachycardia, sepsis syndrome. 



PLAN:  Minimize sedation.  Continue nutrition and PT. 



Start slowly weaning. 



Antibiotics. 



One hour of critical time.







Job ID:  [id number]

## 2019-01-14 NOTE — PDOC.PN
- Subjective


Encounter Start Date: 01/14/19


Encounter Start Time: 15:45


Subjective: Seen and examined still on life support





- Objective


Resuscitation Status - Order Detail:





01/07/19 04:33


Resuscitation Status Routine 


   Resuscitation Status: FULL: Full Resuscitation








Vital Signs & Weight: 


 Vital Signs (12 hours)











  Temp Pulse Pulse Resp BP BP Pulse Ox


 


 01/14/19 15:25   98    176/81 H  


 


 01/14/19 13:44    104 H    176/68 H 


 


 01/14/19 13:22   95    176/68 H  


 


 01/14/19 12:00  98.9 F    22 H   


 


 01/14/19 10:21   97    141/64 H  


 


 01/14/19 10:00     22 H   


 


 01/14/19 08:38   104 H    170/78 H  


 


 01/14/19 08:00  98 F    16    98


 


 01/14/19 06:00     14   


 


 01/14/19 04:00  99.1 F    17   














  Pulse Ox


 


 01/14/19 15:25 


 


 01/14/19 13:44  98


 


 01/14/19 13:22 


 


 01/14/19 12:00 


 


 01/14/19 10:21 


 


 01/14/19 10:00 


 


 01/14/19 08:38 


 


 01/14/19 08:00 


 


 01/14/19 06:00 


 


 01/14/19 04:00 








 Weight











Admit Weight                   249 lb


 


Weight                         249 lb 1.957 oz











 Most Recent Monitor Data











Heart Rate from ECG            110


 


NIBP                           176/68


 


NIBP BP-Mean                   104


 


Respiration from ECG           16


 


SpO2                           98














I&O: 


 











 01/13/19 01/14/19 01/15/19





 06:59 06:59 06:59


 


Intake Total 3982.1 4346 160


 


Output Total 2345 3935 1080


 


Balance 1637.1 411 -920











Result Diagrams: 


 01/14/19 04:34





 01/14/19 04:34


Additional Labs: 


 Accuchecks











  01/14/19 01/14/19 01/14/19





  12:00 08:28 04:24


 


POC Glucose  228 H  244 H  227 H














  01/13/19 01/13/19 01/13/19





  23:52 20:18 16:57


 


POC Glucose  214 H  186 H  174 H














Phys Exam





- Physical Examination


intubated


HEENT: PERRLA, moist MMs, sclera anicteric, TM's clear


Neck: no nodes, no JVD


Respiratory: no wheezing


vented sounds


Cardiovascular: RRR, no significant murmur, no rub


Gastrointestinal: soft, non-tender, no distention, positive bowel sounds


Musculoskeletal: no edema, pulses present





Dx/Plan


(1) Abscess of left thigh


Code(s): L02.416 - CUTANEOUS ABSCESS OF LEFT LOWER LIMB   Status: Acute   





(2) Acute renal failure


Status: Acute   


Qualifiers: 


   Acute renal failure type: with acute tubular necrosis   Qualified Code(s): 

N17.0 - Acute kidney failure with tubular necrosis   





(3) DM type 2 (diabetes mellitus, type 2)


Status: Acute   


Qualifiers: 


   Diabetes mellitus long term insulin use: with long term use   Diabetes 

mellitus complication status: with kidney complications 





(4) HTN (hypertension)


Code(s): I10 - ESSENTIAL (PRIMARY) HYPERTENSION   Status: Acute   


Qualifiers: 


   Hypertension type: essential hypertension   Qualified Code(s): I10 - 

Essential (primary) hypertension   





(5) Septic shock


Code(s): A41.9 - SEPSIS, UNSPECIFIED ORGANISM; R65.21 - SEVERE SEPSIS WITH 

SEPTIC SHOCK   Status: Acute   





(6) CHF (congestive heart failure)


Code(s): I50.9 - HEART FAILURE, UNSPECIFIED   Status: Acute   





(7) Diastolic CHF


Code(s): I50.30 - UNSPECIFIED DIASTOLIC (CONGESTIVE) HEART FAILURE   Status: 

Acute   





- Plan


PT/OT, , respiratory therapy


Appreciate Renal and pulmonary ,Trauma and ID input





* .

## 2019-01-14 NOTE — PRG
DATE OF SERVICE:  01/14/2019



SUBJECTIVE:  Mr. Bojorquez is a 68-year-old man, who was admitted with bullous

cellulitis involving the left lower extremity secondary to group A streptococcus

infection. 



The patient has been on mechanical ventilator support since admission. 



Group A strep septicemia appears to be resolving. 



The patient is currently on no vasopressor or inotropic support.



OBJECTIVE:  VITAL SIGNS:  Today include blood pressure 149/62, pulse 77, respiratory

rate is 13, temperature 99.1 degrees Fahrenheit, oxygen saturation is 100% on FiO2

of 30%. 

EXTREMITIES:  Left lower extremity, the groin incision is clean with no gross

purulence or necrosis.  The multiple left lower extremity bullous lesions have

coalesced into second-degree burn type wound.  The left thigh and left calf are soft

to palpation.  He has 2+ radial and pedal pulses bilaterally.  Capillary refill is

less than 2 seconds in all extremities. 



LABORATORY FINDINGS:  Today includes a CBC with [place]

[address], hemoglobin and hematocrit are 9.7 and 27.5 respectively.  Platelet count is

200,000. 



Metabolic profile; sodium 143, potassium 5, chloride is 110, bicarb 23, ,

creatinine is 2.79 and improving, glucose is 233. 



IMPRESSION:  

1. Left lower extremity bullous cellulitis secondary to group A soft tissue

infection. 

2. Resolving group A Strep septicemia.

3. Resolving acute kidney injury.



PLAN:  Continue with local wound care. 



There remains no acute surgical indication for this patient at this time.







Job ID:  [id number]

## 2019-01-14 NOTE — PRG
DATE OF SERVICE:  01/13/2019



SUBJECTIVE:  He remains in the ICU, intubated on the vent, sedated, on Diprivan and

Precedex. X-ray still shows diffuse pulmonary infiltrates. His white count is still

17,000, H and H 8 and 25, and platelet count normal. He still has a big left shift,

66 segs, 23 bands. PO2 is 91, pCO2 is 40%, __________, at the rate of 20, 40%; 600

tidal volume. Creatinine is 3 and his BUN is 100. His urine output is 4559 in, 2810

out. 



OBJECTIVE:  CHEST: Decreased breath sounds. Bilateral rhonchi. There is no wheezing. 

CARDIAC: Sinus tach. 

ABDOMEN: Soft.



IMPRESSION:  

1. Sepsis, cellulitis, left leg.

2. Respiratory failure.

3. Acute respiratory distress syndrome.

4. Congestive heart failure.

5. Renal failure.



PLAN:  He is on full-dose Lovenox for his SVT. He is on lot of pressors. He is on

clindamycin and Levaquin. He is not weanable at this stage. We will continue

sedation and PT. 



Continue nutrition. One-half hour of critical care time.







Job ID:  [id number]

## 2019-01-14 NOTE — PRG
DATE OF SERVICE:  01/14/2019



RENAL MEDICINE.



SUBJECTIVE:  Mr. Bojorquez is a 68-year-old white male, who came in septic secondary

to left leg cellulitis/necrotizing fascitis.  Renal Service saw the patient for his

acute kidney injury.  We still feel that this is hemodynamically-mediated renal

dysfunction.  His urine output remains constantly adequate for the moment.  His last

24-hour urine was noted at 3.9 L.  Please note, the patient has been receiving

p.r.n. furosemide in the past. 



OBJECTIVE:  VITAL SIGNS:  Blood pressure is 170/78, heart rate is 77, respiratory

rate 13, and pulse ox 100%. 

GENERAL:  Sedated, intubated on ventilator support. 

SKIN:  Adequate turgor. 

HEENT:  He has pinkish conjunctivae.  Anicteric sclerae. 

NECK:  No neck mass.  No carotid bruits.  No JVD. 

CHEST:  No deformities. 

LUNGS:  Decreased breath sounds. 

HEART:  Normal sinus rhythm.  No murmur.  No gallops.  No rubs. 

ABDOMEN:  Globular, soft, and nontender. 

EXTREMITIES:  No edema.  Positive for left leg cellulitis.



MEDICATIONS:  Medications of January 14, 2019, reviewed.



LABORATORY DATA:  Laboratories of January 14, 2019, white count 16.4 and hemoglobin

9.7.  Sodium 143, potassium 5, chloride 110, carbon dioxide 23, , creatinine

2.79, GFR 23 mL/minute, and calcium is 8.3. 



ASSESSMENT AND PLAN:  

1. Acute kidney injury - hemodynamically-mediated renal dysfunction with the

possibility of superimposed acute tubular necrosis. Stabilizing renal function.

Creatinine is much improved from yesterday's value of 3.12 to the most recent value

of 2.79.  Continue supportive care.  The patient has received colloids and 

crystalloids in the past.  He did receive also a p.r.n. Lasix.

2. Left leg cellulitis/necrotizing fasciitis - on antibiotics.  Surgery and ID are

following. 



No indication for any dialytic intervention.







Job ID:  [id number]

## 2019-01-14 NOTE — PQF
DATE:        1-14-19                                               ATTN:  DR. LIVIER WHITE



Please exercise your independent, professional judgment in responding to the 
clarification form. 

Clinical indicators are provided on the bottom of this form for your review



Please check appropriate box(s):



HEART FAILURE:



A.  TYPE:

             [  ] Systolic / HFrEF      [  ] Diastolic / HFpEF       [  ] 
Combined Systolic / Diastolic

B.  ACUITY

             [  ] Acute          [  ] Acute on Chronic          [  ] Chronic

[  ] Other diagnosis ___________

[  ] Unable to determine



In addition, please specify:

Present on Admission (POA):  [  ] Yes             [  ] No             [  ] 
Unable to determine



For continuity of documentation, please document condition throughout progress 
notes and discharge summary.  Thank You.



CLINICAL INDICATORS - SIGNS / SYMPTOMS / LABS



CONSULT NOTE DR. GUIDRY 1-11-19:   WITH CONGESTIVE HEART FAILURE,  TRIAL OF LASIX



CONSULT NOTE DR. GUIDRY 1-12-19:   CONGESTIVE HEART FAILURE



CXR:   1-12-19: 

   STABLE NONSPECIFIC DIFFUSE INTERSTITIAL OPACITITY WITH PULMONARY VASCULAR 
CONGESTION 

   AND HAZY GROUND- GLASS OPACITY BILATERALLY



ECHO 1-7-19:   OVERALL L VENTRICULAR FUNCTION IS MILDLY DEPRESSED,  EF IS 
VISUALLY ESTIMATED 40-45%



RISKS:  ER:   HX HTN,  DM 2,  MI,  SMOKER

             H&P:   HX OF CAD,  DM 2,  HTN,  HYPERLIPIDEMIA,  PVD SECONDARY TO 
DM 2



TREATMENTS:  CONSULT NOTE DR. GUIDRY 1-11-19:   WITH CONGESTIVE HEART FAILURE,  
TRIAL OF LASIX

                         RT OXYGEN:  1-7-19:   O2 NC



(This form is maintained as a part of the permanent medical record)

 2015 [employer]  All Rights Reserved

[email or web address]    Office:  [phone]

                                                              

 

[facility] Hospital[facility]

## 2019-01-14 NOTE — RAD
CHEST 1 VIEW:

 

HISTORY: 

Dyspnea.  Respiratory failure.

 

COMPARISON: 

1/14/2019.

 

FINDINGS: 

Cardiac silhouette is magnified and upper limits of normal in size.  Pulmonary vasculature is more en
gorged than on the prior study with worsening bilateral perihilar infiltrate and diffuse reticulonodu
lar interstitial prominence.  Mediastinum is midline.  Lines and tubes appear unchanged in position. 
 No evidence of pneumothorax.

 

IMPRESSION: 

Radiographic worsening of pulmonary vascular congestion/developing edema.

 

POS: [facility]

## 2019-01-15 LAB
ANALYZER IN CARDIO: (no result)
ANION GAP SERPL CALC-SCNC: 14 MMOL/L (ref 10–20)
BASE EXCESS STD BLDA CALC-SCNC: -2.1 MEQ/L
BUN SERPL-MCNC: 104 MG/DL (ref 8.4–25.7)
CA-I BLDA-SCNC: 1.15 MMOL/L (ref 1.12–1.3)
CALCIUM SERPL-MCNC: 8.3 MG/DL (ref 7.8–10.44)
CHLORIDE SERPL-SCNC: 115 MMOL/L (ref 98–107)
CO2 SERPL-SCNC: 24 MMOL/L (ref 23–31)
CREAT CL PREDICTED SERPL C-G-VRATE: 44 ML/MIN (ref 70–130)
GLUCOSE SERPL-MCNC: 219 MG/DL (ref 80–115)
HBSAG INDEX: 0.44 S/CO (ref 0–0.99)
HBV SURFACE AB SERPL IA-ACNC: 1.69 MIU/ML
HCO3 BLDA-SCNC: 24.5 MEQ/L (ref 22–28)
HCT VFR BLDA CALC: 29 % (ref 42–52)
HEP B CORE TOTAL INDEX: 0.17 S/CO (ref 0–0.79)
HEP C INDEX: 0.12 S/CO (ref 0–0.79)
HGB BLD-MCNC: 9.5 G/DL (ref 14–18)
HGB BLDA-MCNC: 9.7 G/DL (ref 14–18)
MCH RBC QN AUTO: 31.5 PG (ref 27–31)
MCV RBC AUTO: 95.6 FL (ref 78–98)
MDIFF COMPLETE?: YES
O2 A-A PPRESDIFF RESPIRATORY: 146.47 MM[HG] (ref 0–20)
PCO2 BLDA: 50.5 MMHG (ref 35–45)
PH BLDA: 7.3 [PH] (ref 7.35–7.45)
PLATELET # BLD AUTO: 240 THOU/UL (ref 130–400)
PO2 BLDA: 75.6 MMHG (ref 80–?)
POTASSIUM BLD-SCNC: 5.35 MMOL/L (ref 3.7–5.3)
POTASSIUM SERPL-SCNC: 5.9 MMOL/L (ref 3.5–5.1)
POTASSIUM SERPL-SCNC: 6.2 MMOL/L (ref 3.5–5.1)
RBC # BLD AUTO: 3.01 MILL/UL (ref 4.7–6.1)
SODIUM SERPL-SCNC: 147 MMOL/L (ref 136–145)
SPECIMEN DRAWN FROM PATIENT: (no result)
WBC # BLD AUTO: 17.1 THOU/UL (ref 4.8–10.8)

## 2019-01-15 RX ADMIN — Medication SCH ML: at 09:15

## 2019-01-15 RX ADMIN — PENICILLIN G POTASSIUM SCH MLS: 5000000 POWDER, FOR SOLUTION INTRAMUSCULAR; INTRAPLEURAL; INTRATHECAL; INTRAVENOUS at 21:46

## 2019-01-15 RX ADMIN — INSULIN GLARGINE SCH MLS: 100 INJECTION, SOLUTION SUBCUTANEOUS at 21:07

## 2019-01-15 RX ADMIN — INSULIN HUMAN PRN UNITS: 100 INJECTION, SOLUTION PARENTERAL at 05:45

## 2019-01-15 RX ADMIN — INSULIN HUMAN PRN UNITS: 100 INJECTION, SOLUTION PARENTERAL at 17:50

## 2019-01-15 RX ADMIN — HYDROCORTISONE SODIUM SUCCINATE SCH MG: 100 INJECTION, POWDER, FOR SOLUTION INTRAMUSCULAR; INTRAVENOUS at 09:10

## 2019-01-15 RX ADMIN — INSULIN GLARGINE SCH MLS: 100 INJECTION, SOLUTION SUBCUTANEOUS at 09:11

## 2019-01-15 RX ADMIN — CLINDAMYCIN PHOSPHATE SCH MLS: 900 INJECTION, SOLUTION INTRAVENOUS at 09:11

## 2019-01-15 RX ADMIN — FENTANYL CITRATE SCH MLS: 50 INJECTION, SOLUTION INTRAMUSCULAR; INTRAVENOUS at 17:24

## 2019-01-15 RX ADMIN — Medication SCH ML: at 21:24

## 2019-01-15 RX ADMIN — CLINDAMYCIN PHOSPHATE SCH MLS: 900 INJECTION, SOLUTION INTRAVENOUS at 00:51

## 2019-01-15 RX ADMIN — PENICILLIN G POTASSIUM SCH MLS: 5000000 POWDER, FOR SOLUTION INTRAMUSCULAR; INTRAPLEURAL; INTRATHECAL; INTRAVENOUS at 14:43

## 2019-01-15 RX ADMIN — INSULIN HUMAN PRN UNITS: 100 INJECTION, SOLUTION PARENTERAL at 09:13

## 2019-01-15 RX ADMIN — HYDROCORTISONE SODIUM SUCCINATE SCH MG: 100 INJECTION, POWDER, FOR SOLUTION INTRAMUSCULAR; INTRAVENOUS at 21:07

## 2019-01-15 RX ADMIN — INSULIN HUMAN PRN UNITS: 100 INJECTION, SOLUTION PARENTERAL at 20:45

## 2019-01-15 RX ADMIN — PENICILLIN G POTASSIUM SCH MLS: 5000000 POWDER, FOR SOLUTION INTRAMUSCULAR; INTRAPLEURAL; INTRATHECAL; INTRAVENOUS at 05:32

## 2019-01-15 RX ADMIN — INSULIN HUMAN PRN UNITS: 100 INJECTION, SOLUTION PARENTERAL at 12:46

## 2019-01-15 RX ADMIN — CLINDAMYCIN PHOSPHATE SCH MLS: 900 INJECTION, SOLUTION INTRAVENOUS at 17:24

## 2019-01-15 RX ADMIN — SILVER SULFADIAZINE SCH APPLIC: 10 CREAM TOPICAL at 21:23

## 2019-01-15 RX ADMIN — SILVER SULFADIAZINE SCH APPLIC: 10 CREAM TOPICAL at 09:15

## 2019-01-15 RX ADMIN — INSULIN HUMAN PRN UNITS: 100 INJECTION, SOLUTION PARENTERAL at 00:56

## 2019-01-15 RX ADMIN — FENTANYL CITRATE SCH MLS: 50 INJECTION, SOLUTION INTRAMUSCULAR; INTRAVENOUS at 06:20

## 2019-01-15 NOTE — PRG
DATE OF SERVICE:  01/15/2019



SUBJECTIVE:  This morning remains intubated in the vent on Precedex and fentanyl.

Still having asynchronous vent. 



OBJECTIVE:  VITAL SIGNS:  Pulse is 97, blood pressure 108/80, saturations are 90%,

respiratory rate 19.  His I's and O's are 4346 in, 3935 out. 

CHEST:  Decreased breath sounds.  Bilateral rhonchi. 

CARDIAC:  Sinus tach. 

ABDOMEN:  Soft.



LABORATORY STUDIES:  White count 17,000, H and H 9 and 28, platelet count 240.  PO2

is 75, pCO2 of 30%.  Potassium is 6.2, BUN is 104, creatinine is 2.53. 



IMAGING STUDIES:  X-ray shows diffuse pulmonary infiltrates, now some pleural

effusion. 



IMPRESSION:  

1. Respiratory failure.

2. Renal failure.

3. Congestive heart failure.

4. Supraventricular tachycardia.

5. Sepsis syndrome.

6. Encephalopathy.

7. He is clearly not weanable.  He is day #8 on the vent.



PLAN:  

1. Precedex is not helping his overall encephalopathy.

2. Adjust his vent.  Continue Nutrition and PT.  He is probably going to need

dialysis 

for his elevated potassium, worsening renal function, worsening chest x-ray.

Discuss with Nephrology. 

3. Prognosis is guarded.  We will follow. 



This is one-half hour critical time.





Job ID:  [id number]

## 2019-01-15 NOTE — RAD
PORTABLE AP CHEST RADIOGRAPH:

 

Date: 1-15-19 

 

History: Respiratory failure. Intubated. 

 

Comparison: 1-14-19

 

FINDINGS: 

Endotracheal tube remains in place. Multiple cardiac monitor leads overlie the chest. This exam was o
btained in shallow depth of inspiration and in combination with portable technique accentuates the ca
rdiac silhouette and the bronchovascular markings. However, there is evidence of increased interstiti
al alveolar opacities within the perihilar regions bilaterally. Findings may be related to an element
 of pulmonary edema. Pulmonary vasculature remains increased. No other interval change. 

 

IMPRESSION: 

Limited exam due to depth of inspiration and portable technique, but there are findings suggestive of
 pulmonary vascular congestion as well as pulmonary edema. Follow up examination is recommended. 

 

POS: [facility]

## 2019-01-15 NOTE — PRG
DATE OF SERVICE:  01/15/2019



SUBJECTIVE:  Mr. Bojorquez is a 68-year-old white male, who was admitted for sepsis.

We are following this patient for his acute kidney injury.  Renal function has been

stabilizing.  However, he is noted to have worsening mental status change.  In

addition, he has been noted to be in volume overload.  I did discuss the case with

Dr. Corral and consideration for hemodialysis was made for fluid removal with this

patient. 



OBJECTIVE:  VITAL SIGNS:  Blood pressure is 182/108 with a heart rate of 114,

respiratory rate 27, and pulse ox 93%. 

GENERAL:  The patient is sedated, intubated, on ventilator support. 

SKIN:  Adequate turgor. 

HEENT:  He has slightly pale conjunctivae.  Anicteric sclerae. 

NECK:  No neck mass.  No carotid bruits.  No JVD. 

CHEST:  No deformities. 

LUNGS:  Decreased breath sounds. 

HEART:  Normal sinus rhythm.  No murmur.  No gallops.  No rubs. 

ABDOMEN:  Globular, soft, nontender.  No masses. 

EXTREMITIES:  Positive for edema.



MEDICATIONS:  Medications of January 15, 2019 was reviewed.



LABORATORY DATA:  Laboratories of January 15, 2019; white count 17.1, hemoglobin

9.5.  On January 15, 2019; sodium 142, potassium 6.2, chloride 115, carbon dioxide

24, , creatinine 2.58, glucose 219, calcium 8.3. 



ASSESSMENT AND PLAN:  

1. Acute kidney injury - consider possibility of ischemic ATN.  Although creatinine

is improving, his overall status remains unimproved.  He may be uremic to explain

the decreased mentation.  He is also noted to be hypokalemic and volume overload.

For this reason, we will initiate dialysis.  We will consult Surgery for placement

of a temporary femoral dialysis catheter. 

2. Sepsis - on antibiotics.  Continue supportive care.

3. Leg cellulitis/necrotizing fasciitis - clinically improving on antibiotics.

Surgery and 

ID following.

4. Overall prognosis remains guarded.







Job ID:  [id number]

## 2019-01-15 NOTE — ULT
BILATERAL UPPER EXTREMITY VEIN MAPPING:

 

Date:  01/15/19

 

HISTORY:  

End-stage renal disease; evaluate for dialysis access. 

 

FINDINGS:

 

RIGHT UPPER EXTREMITY

 

CEPHALIC VEIN

Proximal Arm:  2.7 mm

Mid Arm:  2.0 mm

Distal Arm:  1.9 mm

Antecubital Fossa:  3.3 mm

Proximal Forearm:  2.4 mm

Mid Forearm:  1.7 mm

Distal Forearm:  2.8 mm

 

BASILIC VEIN

Proximal Arm:  2.6 mm

Mid Arm:  2.5 mm

Distal Arm:  2.1 mm

Antecubital Fossa:  1.8 mm

Proximal Forearm:  1.2 mm

Mid Forearm:  1.4 mm

Distal Forearm:  0.9 mm

 

 

LEFT UPPER EXTREMITY 

 

CEPHALIC VEIN

Proximal Arm:  1.2 mm

Mid Arm:  1.9 mm

Distal Arm:  1.4 mm

Antecubital Fossa:  1.8 mm

Proximal Forearm:  3.5 mm

Mid Forearm:  3.6 mm

Distal Forearm:  3.2 mm

 

BASILIC VEIN

Proximal Arm:  4.1 mm

Mid Arm:  5.2 mm

Distal Arm:  Not visualized due to bandage

Antecubital Fossa:  5.4 mm

Proximal Forearm:  3.5 mm

Mid Forearm:  1.1 mm

Distal Forearm:  1.0 mm

 

RIGHT BRACHIAL ARTERY:  4.9 mm

RIGHT RADIAL ARTERY:  2.5 mm

RIGHT ULNAR ARTERY:  1.0 mm

 

LEFT BRACHIAL ARTERY:  6.6 mm

LEFT RADIAL ARTERY:  1.7 mm

LEFT ULNAR ARTERY:  2.0 mm

 

IMPRESSION: 

Vein mapping as discussed above.  

 

POS: [facility]

## 2019-01-15 NOTE — PRG
DATE OF SERVICE:  01/15/2019



SUBJECTIVE:  This is a 68-year-old male whom the surgical team is seeing in

consultation for bullous cellulitis of the left lower extremity secondary to group A

streptococcus infection.  The patient remains on mechanical ventilator support.  He

is not on any vasopressor or inotropic support at this time.  Dressing changes are

being performed by nursing staff.  He has been afebrile overnight. 



OBJECTIVE:  VITAL SIGNS:  Temperature 99.3, pulse 114, blood pressure 182/108, and

respiratory rate 22. 

GENERAL:  Intubated and sedated. 

PULMONARY:  Symmetric chest rise. 

CARDIOVASCULAR:  Tachycardic.  No obvious murmurs, rubs, or gallops. 

GI:  Abdomen is soft, nontender, and nondistended. 

MUSCULOSKELETAL:  Left lower extremity dressing.  Groin wound is clean without any

obvious areas of necrosis.  The left thigh wounds, left calf wounds, and ankle

wounds were evaluated.  There does not appear to be any superimposed infection.

Erythema is stable. 



LABORATORY FINDINGS:  WBC 17.1, hemoglobin 9.5, hematocrit 28.8, platelet count 240,

and bandemia at 20%.  Sodium 147, potassium 5.9, chloride 115, carbon dioxide 24,

, creatinine 2.58, and glucose 219. 



ASSESSMENT:  

1. Left lower extremity bullous cellulitis secondary to group A soft tissue

infection. 

2. Resolving group A strep septicemia.

3. Acute kidney injury, improving.

4. Hyperkalemia.

5. Hyperglycemia.



PLAN:  Continue with local wound care as ordered.  We will have wound team

re-evaluate and re-document wounds.  There is no acute surgical indication for this

patient 

at this time.  Nephrology has contacted Dr. Weston for dialysis catheter placement.

Otherwise, care per primary team.  We will continue to follow.  Plan of care was 

discussed with the patient's wife and nurse at bedside, and all questions were

answered at the time of this dictation.  The patient was discussed with attending. 







Job ID:  [id number]

## 2019-01-16 LAB
ALBUMIN SERPL BCG-MCNC: 2.4 G/DL (ref 3.4–4.8)
ALP SERPL-CCNC: 96 U/L (ref 40–150)
ALT SERPL W P-5'-P-CCNC: 39 U/L (ref 8–55)
ANALYZER IN CARDIO: (no result)
ANION GAP SERPL CALC-SCNC: 11 MMOL/L (ref 10–20)
AST SERPL-CCNC: 23 U/L (ref 5–34)
BASE EXCESS STD BLDA CALC-SCNC: -2.8 MEQ/L
BASOPHILS # BLD AUTO: 0 THOU/UL (ref 0–0.2)
BASOPHILS NFR BLD AUTO: 0.2 % (ref 0–1)
BILIRUB DIRECT SERPL-MCNC: 0.7 MG/DL (ref 0.1–0.3)
BILIRUB SERPL-MCNC: 1 MG/DL (ref 0.2–1.2)
BUN SERPL-MCNC: 98 MG/DL (ref 8.4–25.7)
CA-I BLDA-SCNC: 1.2 MMOL/L (ref 1.12–1.3)
CALCIUM SERPL-MCNC: 8.5 MG/DL (ref 7.8–10.44)
CHLORIDE SERPL-SCNC: 114 MMOL/L (ref 98–107)
CO2 SERPL-SCNC: 28 MMOL/L (ref 23–31)
CREAT CL PREDICTED SERPL C-G-VRATE: 48 ML/MIN (ref 70–130)
EOSINOPHIL # BLD AUTO: 0.1 THOU/UL (ref 0–0.7)
EOSINOPHIL NFR BLD AUTO: 0.6 % (ref 0–10)
GLUCOSE SERPL-MCNC: 248 MG/DL (ref 80–115)
HCO3 BLDA-SCNC: 24.9 MEQ/L (ref 22–28)
HCT VFR BLDA CALC: 29 % (ref 42–52)
HGB BLD-MCNC: 8.9 G/DL (ref 14–18)
HGB BLDA-MCNC: 9.7 G/DL (ref 14–18)
LYMPHOCYTES # BLD: 0.9 THOU/UL (ref 1.2–3.4)
LYMPHOCYTES NFR BLD AUTO: 6.4 % (ref 21–51)
MCH RBC QN AUTO: 30.8 PG (ref 27–31)
MCV RBC AUTO: 97.9 FL (ref 78–98)
MONOCYTES # BLD AUTO: 0.3 THOU/UL (ref 0.11–0.59)
MONOCYTES NFR BLD AUTO: 2.2 % (ref 0–10)
NEUTROPHILS # BLD AUTO: 13.3 THOU/UL (ref 1.4–6.5)
NEUTROPHILS NFR BLD AUTO: 90.6 % (ref 42–75)
O2 A-A PPRESDIFF RESPIRATORY: 106.58 MM[HG] (ref 0–20)
PCO2 BLDA: 58.5 MMHG (ref 35–45)
PH BLDA: 7.25 [PH] (ref 7.35–7.45)
PLATELET # BLD AUTO: 218 THOU/UL (ref 130–400)
PO2 BLDA: 105.5 MMHG (ref 80–?)
POTASSIUM BLD-SCNC: 4.92 MMOL/L (ref 3.7–5.3)
POTASSIUM SERPL-SCNC: 5 MMOL/L (ref 3.5–5.1)
RBC # BLD AUTO: 2.88 MILL/UL (ref 4.7–6.1)
SODIUM SERPL-SCNC: 148 MMOL/L (ref 136–145)
SPECIMEN DRAWN FROM PATIENT: (no result)
WBC # BLD AUTO: 14.7 THOU/UL (ref 4.8–10.8)

## 2019-01-16 RX ADMIN — HYDROCORTISONE SODIUM SUCCINATE SCH MG: 100 INJECTION, POWDER, FOR SOLUTION INTRAMUSCULAR; INTRAVENOUS at 08:53

## 2019-01-16 RX ADMIN — FENTANYL CITRATE SCH MLS: 50 INJECTION, SOLUTION INTRAMUSCULAR; INTRAVENOUS at 02:23

## 2019-01-16 RX ADMIN — INSULIN HUMAN PRN UNITS: 100 INJECTION, SOLUTION PARENTERAL at 16:13

## 2019-01-16 RX ADMIN — SILVER SULFADIAZINE SCH APPLIC: 10 CREAM TOPICAL at 11:45

## 2019-01-16 RX ADMIN — INSULIN GLARGINE SCH MLS: 100 INJECTION, SOLUTION SUBCUTANEOUS at 21:11

## 2019-01-16 RX ADMIN — INSULIN HUMAN PRN UNITS: 100 INJECTION, SOLUTION PARENTERAL at 12:28

## 2019-01-16 RX ADMIN — FENTANYL CITRATE SCH MLS: 50 INJECTION, SOLUTION INTRAMUSCULAR; INTRAVENOUS at 19:59

## 2019-01-16 RX ADMIN — Medication SCH ML: at 21:49

## 2019-01-16 RX ADMIN — FENTANYL CITRATE SCH MLS: 50 INJECTION, SOLUTION INTRAMUSCULAR; INTRAVENOUS at 10:27

## 2019-01-16 RX ADMIN — SILVER SULFADIAZINE SCH APPLIC: 10 CREAM TOPICAL at 22:28

## 2019-01-16 RX ADMIN — INSULIN GLARGINE SCH MLS: 100 INJECTION, SOLUTION SUBCUTANEOUS at 08:54

## 2019-01-16 RX ADMIN — INSULIN HUMAN PRN UNITS: 100 INJECTION, SOLUTION PARENTERAL at 00:29

## 2019-01-16 RX ADMIN — CLINDAMYCIN PHOSPHATE SCH MLS: 900 INJECTION, SOLUTION INTRAVENOUS at 08:54

## 2019-01-16 RX ADMIN — PENICILLIN G POTASSIUM SCH MLS: 5000000 POWDER, FOR SOLUTION INTRAMUSCULAR; INTRAPLEURAL; INTRATHECAL; INTRAVENOUS at 21:49

## 2019-01-16 RX ADMIN — INSULIN HUMAN PRN UNITS: 100 INJECTION, SOLUTION PARENTERAL at 04:50

## 2019-01-16 RX ADMIN — Medication SCH ML: at 10:24

## 2019-01-16 RX ADMIN — PENICILLIN G POTASSIUM SCH MLS: 5000000 POWDER, FOR SOLUTION INTRAMUSCULAR; INTRAPLEURAL; INTRATHECAL; INTRAVENOUS at 05:00

## 2019-01-16 RX ADMIN — HYDROCORTISONE SODIUM SUCCINATE SCH MG: 100 INJECTION, POWDER, FOR SOLUTION INTRAMUSCULAR; INTRAVENOUS at 21:48

## 2019-01-16 RX ADMIN — CLINDAMYCIN PHOSPHATE SCH MLS: 900 INJECTION, SOLUTION INTRAVENOUS at 01:58

## 2019-01-16 RX ADMIN — CLINDAMYCIN PHOSPHATE SCH MLS: 900 INJECTION, SOLUTION INTRAVENOUS at 17:22

## 2019-01-16 RX ADMIN — PENICILLIN G POTASSIUM SCH MLS: 5000000 POWDER, FOR SOLUTION INTRAMUSCULAR; INTRAPLEURAL; INTRATHECAL; INTRAVENOUS at 13:46

## 2019-01-16 NOTE — PRG
DATE OF SERVICE:  01/16/2019



SUBJECTIVE:  Danial Bojorquez remains intubated in the vent, still quite

encephalopathic, he was dialyzed yesterday. 



OBJECTIVE:  VITAL SIGNS:  Stable.  Sats 100%.  Blood pressure 148/86, pulse 82,

respiratory rate 18.  I's and O's have been good at 3055 in and 3320 out. 

CHEST:  Bilateral rhonchi and crackles. 

CARDIAC:  Normal S1 and S2.  No gallops. 

ABDOMEN:  No masses.



LABORATORY DATA:  White count 14,000, __________ platelet count is 218.  BUN and

creatinine 98 and 2.39.  Glucose 240. 



IMPRESSION:  

1. Sepsis syndrome.

2. Encephalopathy.

3. Renal failure.

4. Acute respiratory distress syndrome. 

5. Congestive heart failure.

6. Supraventricular tachycardia. 



Day #8 on the vent.  Added low-dose risperidone to control his encephalopathy.

Overall, he remains relatively stable.  Unable to get him off the vent in the next

several days when he has a trach and a PEG. 



I will add a liver profile as he appears to be somewhat jaundiced. 



One-half hour of critical time.







Job ID:  [id number]

## 2019-01-16 NOTE — PDOC.PN
- Subjective


Encounter Start Date: 01/16/19


Encounter Start Time: 13:15


-: non-verbal





- Objective


Resuscitation Status - Order Detail:





01/07/19 04:33


Resuscitation Status Routine 


   Resuscitation Status: FULL: Full Resuscitation








Vital Signs & Weight: 


 Vital Signs (12 hours)











  Temp Pulse Pulse Pulse Resp BP BP


 


 01/16/19 14:00      13  


 


 01/16/19 13:51   73     101/41 L 


 


 01/16/19 13:50   76    11 L  


 


 01/16/19 12:00  97.8 F     15  


 


 01/16/19 11:22   87     165/71 H 


 


 01/16/19 10:00      12  


 


 01/16/19 09:30    84  79    91/43 L


 


 01/16/19 08:00  97.7 F     12  


 


 01/16/19 07:38   79     148/86 H 


 


 01/16/19 07:36   82    12  


 


 01/16/19 06:00      15  


 


 01/16/19 04:00  97.8 F     14  














  BP Pulse Ox Pulse Ox Pulse Ox


 


 01/16/19 14:00    


 


 01/16/19 13:51    


 


 01/16/19 13:50   100  


 


 01/16/19 12:00    


 


 01/16/19 11:22    


 


 01/16/19 10:00    


 


 01/16/19 09:30  86/42 L   97  97


 


 01/16/19 08:00   100  


 


 01/16/19 07:38    


 


 01/16/19 07:36   100  


 


 01/16/19 06:00    


 


 01/16/19 04:00    








 Weight











Admit Weight                   249 lb


 


Weight                         249 lb 1.957 oz











 Most Recent Monitor Data











Heart Rate from ECG            83


 


NIBP                           132/67


 


NIBP BP-Mean                   88


 


Respiration from ECG           8


 


SpO2                           99














I&O: 


 











 01/15/19 01/16/19 01/17/19





 06:59 06:59 06:59


 


Intake Total 4618.1 3055.2 502.8


 


Output Total 5315 3320 725


 


Balance -696.9 -264.8 -222.2











Result Diagrams: 


 01/16/19 04:38





 01/16/19 04:38


Additional Labs: 


 Accuchecks











  01/16/19 01/16/19 01/16/19





  12:06 08:39 04:37


 


POC Glucose  231 H  206 H  240 H














  01/16/19 01/15/19 01/15/19





  00:27 20:44 17:34


 


POC Glucose  253 H  211 H  235 H














Phys Exam





- Physical Examination


Constitutional: NAD


Intubated, sedated.


Respiratory: no wheezing, no rales, no rhonchi, clear to auscultation bilateral


Cardiovascular: RRR, no significant murmur


Gastrointestinal: soft, no distention, positive bowel sounds


Musculoskeletal: no edema


Left LE with wound dressings.  Persistent erythema, but improved.





Dx/Plan


(1) Cellulitis and abscess of left leg


Code(s): L03.116 - CELLULITIS OF LEFT LOWER LIMB; L02.416 - CUTANEOUS ABSCESS 

OF LEFT LOWER LIMB   Status: Acute   





(2) Group A streptococcal infection


Code(s): B95.0 - STREPTOCOCCUS, GROUP A, CAUSING DISEASES CLASSD ELSWHR   Status

: Acute   





(3) Toxic metabolic encephalopathy


Code(s): G92 - TOXIC ENCEPHALOPATHY   Status: Acute   





(4) Acute renal failure


Status: Acute   


Qualifiers: 


   Acute renal failure type: with acute tubular necrosis   Qualified Code(s): 

N17.0 - Acute kidney failure with tubular necrosis   





(5) CAD (coronary artery disease)


Code(s): I25.10 - ATHSCL HEART DISEASE OF NATIVE CORONARY ARTERY W/O ANG PCTRS 

  Status: Acute   


Qualifiers: 


   Coronary Disease-Associated Artery/Lesion type: native artery   Native vs. 

transplanted heart: native heart   Associated angina: without angina   

Qualified Code(s): I25.10 - Atherosclerotic heart disease of native coronary 

artery without angina pectoris   





(6) DKA (diabetic ketoacidoses)


Code(s): E13.10 - OTH DIABETES MELLITUS WITH KETOACIDOSIS WITHOUT COMA   Status

: Acute   





(7) DM type 2 (diabetes mellitus, type 2)


Status: Acute   


Qualifiers: 


   Diabetes mellitus long term insulin use: with long term use   Diabetes 

mellitus complication status: with kidney complications 





(8) Diastolic CHF


Code(s): I50.30 - UNSPECIFIED DIASTOLIC (CONGESTIVE) HEART FAILURE   Status: 

Acute   





(9) HTN (hypertension)


Code(s): I10 - ESSENTIAL (PRIMARY) HYPERTENSION   Status: Acute   


Qualifiers: 


   Hypertension type: essential hypertension   Qualified Code(s): I10 - 

Essential (primary) hypertension   





- Plan





* Hemodialysis initiated yesterday.  Will get again today with longer course.


* Continue Abx per ID recs.


* Continue wound care.


* Pulm following.  Back on modest precedex and fentanyl.


* Discussed with Pulm.  Likely is heading for trach and PEG.


* Encephalopathy persists.  On Risperdal.

## 2019-01-16 NOTE — PRG
DATE OF SERVICE:  01/16/2019



RENAL MEDICINE.



SUBJECTIVE:  Mr. Bojorquez is a 68-year-old white male, who was admitted for sepsis,

developed acute kidney injury secondary to presumed ischemic ATN.  We have initiated

dialysis due to his mental status change that could be related to underlying uremia.

 He dialyzed 1-hour yesterday with fluid removal.  The plan is to do another 2-hour

hemodialysis with this patient today with again fluid removal.  He is still not able

to be extubated.  We will try to max out fluid removal. 



No acute events noted last night.



OBJECTIVE:  VITAL SIGNS:  Blood pressure 159/77, heart rate 88, respiratory rate 7,

and pulse ox is 100%. 

GENERAL:  He is sedated, intubated on ventilator support. 

SKIN:  Adequate turgor, obese. 

HEENT:  Pinkish conjunctivae.  Anicteric sclerae.  No neck mass.  No carotid bruits.

 No JVD. 

LUNGS:  Decreased breath sounds-occasional crackles. 

HEART:  Normal sinus rhythm.  No gallops.  No rubs. 

ABDOMEN:  Globular, soft, nontender. 

EXTREMITIES:  Positive for edema.



MEDICATIONS:  Medications of January 16, 2019 was reviewed.



LABORATORY DATA:  Laboratories of January 16, 2019; white count 14.7, hemoglobin

8.9, sodium 148, potassium 5, chloride 114, carbon dioxide 28, BUN 98, creatinine

2.39, glucose 248, and calcium 8.5. 



ASSESSMENT AND PLAN:  

1. Acute kidney injury-secondary to superimposed acute tubular necrosis, continue

dialytic regimen.  We will plan for daily dialysis with incremental increase in time

by 1 hour until we reach 4 hours.  After reaching 4 hours, we will consider three

times 

a week hemodialysis with this patient.

2. Sepsis syndrome-on antibiotics.  Continue supportive care.

3. Metabolic encephalopathy-continue dialysis regimen. 

Overall prognosis remains guarded.  Case discussed with the wife.







Job ID:  [id number]

## 2019-01-16 NOTE — PRG
DATE OF SERVICE:  01/16/2019



SUBJECTIVE:  This is a 68-year-old male patient, whom the surgical team is 
seeing as

a consultation for bullous cellulitis to the left lower extremity secondary to 
group

A streptococcus infection.  The patient remains mechanically ventilated today.  
He

did also receive dialysis yesterday evening.  His left lower extremity currently

has dressings, which are being changed by nursing staff.  Wound Care is also 
seeing

the patient and is documenting the wound progress.  He has been afebrile,

hemodynamically stable, not on any pressures. 



OBJECTIVE:  VITAL SIGNS:  Pulse is 73, blood pressure 101/41, and O2 saturation 
100%

on mechanical ventilation. 

GENERAL:  The patient is intubated and sedated, comfortable in bed. 

PULMONARY:  He has equal chest rise and fall.  Clear breath sounds. 

CARDIOVASCULAR:  No longer tachycardic.  No murmurs, gallops, or rubs. 

GI:  Abdomen is soft, nontender, and nondistended.  Positive bowel sounds. 

MUSCULOSKELETAL:  He has a dressing over his left lower extremity from above the

knee to the ankle.  He also has another dressing to the left upper medial thigh 
with

packing into the wound.  The left upper thigh wound appears clean and dry with 
no

obvious of purulence or necrosis.  The multiple wounds on his left lower 
extremity

starting from the knee to the ankle look improved today with diminishing 
erythema. 



LABORATORY FINDINGS:  White count is 14.7, hemoglobin is 8.9, hematocrit 28.2, 
and

platelets are 218.  Sodium is 148, potassium 5.0, chloride 114, carbon dioxide 
28,

BUN is 98, creatinine is 2.39, and glucose is 248. 



ASSESSMENT:  

1. Left lower extremity bullous cellulitis secondary to group A soft tissue

infection. 

2. Resolving group A strep septicemia.

3. Acute kidney injury, now on dialysis.

4. Hyperkalemia, improved.

5. Hyperglycemia, stable.

6. Hypernatremia, stable.



PLAN:  Continue with the local wound care b.i.d. as ordered.  Pending re-
evaluation

by Wound Care for further instructions.  There is no surgical indication at this

time.  Nephrology seeing the patient after dialysis catheter placed yesterday.

Continue care per Nephrology team.  Surgery will continue to follow.  Plan of 
care

was discussed with the patient's wife and nurse at bedside.  All questions were

answered at that time.  This patient was discussed with the attending. 







Job ID:  [id number]



MTDD

## 2019-01-17 LAB
ANALYZER IN CARDIO: (no result)
ANION GAP SERPL CALC-SCNC: 11 MMOL/L (ref 10–20)
BASE EXCESS STD BLDA CALC-SCNC: 1.1 MEQ/L
BASOPHILS # BLD AUTO: 0 THOU/UL (ref 0–0.2)
BASOPHILS NFR BLD AUTO: 0.3 % (ref 0–1)
BUN SERPL-MCNC: 84 MG/DL (ref 8.4–25.7)
CA-I BLDA-SCNC: 1.19 MMOL/L (ref 1.12–1.3)
CALCIUM SERPL-MCNC: 8.4 MG/DL (ref 7.8–10.44)
CHLORIDE SERPL-SCNC: 111 MMOL/L (ref 98–107)
CO2 SERPL-SCNC: 29 MMOL/L (ref 23–31)
CREAT CL PREDICTED SERPL C-G-VRATE: 54 ML/MIN (ref 70–130)
EOSINOPHIL # BLD AUTO: 0.1 THOU/UL (ref 0–0.7)
EOSINOPHIL NFR BLD AUTO: 0.5 % (ref 0–10)
GLUCOSE SERPL-MCNC: 239 MG/DL (ref 80–115)
HCO3 BLDA-SCNC: 26.9 MEQ/L (ref 22–28)
HCT VFR BLDA CALC: 26 % (ref 42–52)
HGB BLD-MCNC: 8.2 G/DL (ref 14–18)
HGB BLDA-MCNC: 8.7 G/DL (ref 14–18)
LYMPHOCYTES # BLD: 0.9 THOU/UL (ref 1.2–3.4)
LYMPHOCYTES NFR BLD AUTO: 7.6 % (ref 21–51)
MCH RBC QN AUTO: 31.4 PG (ref 27–31)
MCV RBC AUTO: 98.3 FL (ref 78–98)
MONOCYTES # BLD AUTO: 0.3 THOU/UL (ref 0.11–0.59)
MONOCYTES NFR BLD AUTO: 2.4 % (ref 0–10)
NEUTROPHILS # BLD AUTO: 10.4 THOU/UL (ref 1.4–6.5)
NEUTROPHILS NFR BLD AUTO: 89.2 % (ref 42–75)
O2 A-A PPRESDIFF RESPIRATORY: 89.4 MM[HG] (ref 0–20)
PCO2 BLDA: 48.4 MMHG (ref 35–45)
PH BLDA: 7.36 [PH] (ref 7.35–7.45)
PLATELET # BLD AUTO: 178 THOU/UL (ref 130–400)
PO2 BLDA: 135.3 MMHG (ref 80–?)
POTASSIUM BLD-SCNC: 4.67 MMOL/L (ref 3.7–5.3)
POTASSIUM SERPL-SCNC: 4.8 MMOL/L (ref 3.5–5.1)
RBC # BLD AUTO: 2.6 MILL/UL (ref 4.7–6.1)
SODIUM SERPL-SCNC: 146 MMOL/L (ref 136–145)
SPECIMEN DRAWN FROM PATIENT: (no result)
WBC # BLD AUTO: 11.7 THOU/UL (ref 4.8–10.8)

## 2019-01-17 PROCEDURE — 0JHN3XZ INSERTION OF TUNNELED VASCULAR ACCESS DEVICE INTO RIGHT LOWER LEG SUBCUTANEOUS TISSUE AND FASCIA, PERCUTANEOUS APPROACH: ICD-10-PCS | Performed by: SURGERY

## 2019-01-17 RX ADMIN — PENICILLIN G POTASSIUM SCH MLS: 5000000 POWDER, FOR SOLUTION INTRAMUSCULAR; INTRAPLEURAL; INTRATHECAL; INTRAVENOUS at 06:34

## 2019-01-17 RX ADMIN — SILVER SULFADIAZINE SCH APPLIC: 10 CREAM TOPICAL at 16:00

## 2019-01-17 RX ADMIN — CLINDAMYCIN PHOSPHATE SCH MLS: 900 INJECTION, SOLUTION INTRAVENOUS at 16:35

## 2019-01-17 RX ADMIN — INSULIN HUMAN PRN UNITS: 100 INJECTION, SOLUTION PARENTERAL at 17:19

## 2019-01-17 RX ADMIN — CLINDAMYCIN PHOSPHATE SCH MLS: 900 INJECTION, SOLUTION INTRAVENOUS at 00:05

## 2019-01-17 RX ADMIN — Medication SCH ML: at 09:36

## 2019-01-17 RX ADMIN — FENTANYL CITRATE SCH MLS: 50 INJECTION, SOLUTION INTRAMUSCULAR; INTRAVENOUS at 06:29

## 2019-01-17 RX ADMIN — INSULIN GLARGINE SCH MLS: 100 INJECTION, SOLUTION SUBCUTANEOUS at 09:39

## 2019-01-17 RX ADMIN — PENICILLIN G POTASSIUM SCH MLS: 5000000 POWDER, FOR SOLUTION INTRAMUSCULAR; INTRAPLEURAL; INTRATHECAL; INTRAVENOUS at 21:17

## 2019-01-17 RX ADMIN — INSULIN HUMAN PRN UNITS: 100 INJECTION, SOLUTION PARENTERAL at 04:16

## 2019-01-17 RX ADMIN — INSULIN GLARGINE SCH MLS: 100 INJECTION, SOLUTION SUBCUTANEOUS at 21:17

## 2019-01-17 RX ADMIN — SILVER SULFADIAZINE SCH: 10 CREAM TOPICAL at 09:53

## 2019-01-17 RX ADMIN — PENICILLIN G POTASSIUM SCH MLS: 5000000 POWDER, FOR SOLUTION INTRAMUSCULAR; INTRAPLEURAL; INTRATHECAL; INTRAVENOUS at 14:29

## 2019-01-17 RX ADMIN — CLINDAMYCIN PHOSPHATE SCH MLS: 900 INJECTION, SOLUTION INTRAVENOUS at 09:34

## 2019-01-17 RX ADMIN — FENTANYL CITRATE SCH MLS: 50 INJECTION, SOLUTION INTRAMUSCULAR; INTRAVENOUS at 17:51

## 2019-01-17 RX ADMIN — SILVER SULFADIAZINE SCH APPLIC: 10 CREAM TOPICAL at 21:19

## 2019-01-17 RX ADMIN — Medication SCH ML: at 21:17

## 2019-01-17 RX ADMIN — INSULIN HUMAN PRN UNITS: 100 INJECTION, SOLUTION PARENTERAL at 09:47

## 2019-01-17 NOTE — PRG
DATE OF SERVICE:  01/17/2019



RENAL MEDICINE



SUBJECTIVE:  Mr. Bojorquez is a 68-year-old white male admitted for sepsis, bullous

cellulitis, and seen by the Renal Service for his acute kidney injury.  He initially

had an acute kidney injury, which we thought was prerenal.  However, it remains

unimproved.  He most likely has superimposed ATN.  Dialysis was initiated due to

mental status change/uremic signs and symptoms.  He underwent a 2-hour dialysis

yesterday.  We are going to schedule him for a 3-hour hemodialysis and max out fluid

removal.  He is also in some degree of volume overload. 



No acute events last night.



OBJECTIVE:  VITAL SIGNS:  Blood pressure is 166/74, heart rate 65, and respiratory

rate 11. 

GENERAL:  He is awake, agitated, and intubated on ventilator support. 

SKIN:  Adequate turgor. 

HEENT:  He has a slightly pale conjunctivae.  Anicteric sclerae. 

NECK:  No neck mass.  No carotid bruits.  No JVD. 

CHEST:  No deformities. 

LUNGS:  Decreased breath sounds. 

HEART:  Normal sinus rhythm.  No murmurs.  No gallops.  No rubs. 

ABDOMEN:  Globular, soft, and nontender.  No masses. 

EXTREMITIES:  No edema.  No deformities.  Left leg - bullous cellulitis.



MEDICATIONS:  Medications of January 17, 2019 were reviewed.



LABORATORY DATA:  Laboratories of January 17, 2019; white count 11.7, hemoglobin

8.2.  Sodium 146, potassium 4.8, chloride 111, carbon dioxide 29, BUN is 84,

creatinine 2.13, glucose 239, and calcium 8.4. 



ASSESSMENT AND PLAN:  

1. Acute kidney injury - consider ischemic acute tubular necrosis.  Continue daily

dialysis.  Fluid removal as tolerated by the patient. 

2. Holding off heparin.

3. Sepsis/left leg cellulitis - on antibiotics.

4. Metabolic encephalopathy, still is unimproved.  This could be secondary to his

underlying sepsis. 



Overall prognosis remains guarded.







Job ID:  [id number]

## 2019-01-17 NOTE — PRG
DATE OF SERVICE:  01/17/2019



SUBJECTIVE:  This is a 68-year-old male patient, whom the surgical team is seeing as

a consultation for bullous cellulitis to the left lower extremity secondary to group

A streptococcus infection.  The patient remains mechanically ventilated.  He did

also receive dialysis.  His left lower extremity is currently receiving dressing

changes b.i.d. by Wound Care.  The left lower extremity looks to be improving daily.

 He is afebrile and hemodynamically stable.  His white count is continuing to

improve. 



OBJECTIVE:  VITAL SIGNS:  Heart rate is 71, blood pressure is 93/44, respirations

are 11, and oxygen saturation is 100%. 

GENERAL:  The patient is intubated and sedated, minimally agitated in bed, no signs

of acute distress. 

PULMONARY:  Equal chest rise and fall, clear equal breath sounds, ET tube in place. 

CARDIOVASCULAR:  Normal sinus rhythm.  No murmurs, gallops, or rubs. 

GI:  Abdomen is soft, nontender, and nondistended.  Positive bowel sounds. 

MUSCULOSKELETAL:  He has a dressing over his left lower extremity from above the

knee to the ankle.  He also has another dressing in the left upper mid thigh with

wet-to-dry packing into the wound.  The left upper thigh appears clean and dry with

no purulence or necrosis.  The multiple wounds on his left lower extremity starting

from the knee to the ankle looked similar to yesterday.  Dressing knot continues to

improve. 



LABORATORY FINDINGS:  White count 11.7, hemoglobin 8.2, hematocrit 25.6, platelets

178.  Sodium 146, potassium 4.8, chloride 111, carbon dioxide 29, BUN 84, creatinine

2.1, and glucose 239. 



ASSESSMENT:  

1. Left lower extremity bullous cellulitis secondary to group A soft tissue

infection. 

2. Resolving group A strep septicemia.

3. Acute kidney injury, now on dialysis.

4. Hyperkalemia, improved.

5. Hyperglycemia, stable.

6. Hypernatremia, stable.



PLAN:  Continue with local wound care b.i.d. as ordered.  We will continue to

monitor the wounds daily.  No surgical indication at this time.  All questions and

plan of care were discussed with the patient's family present during examination.

The patient was discussed with the attending. 







Job ID:  [id number]

## 2019-01-17 NOTE — PRG
DATE OF SERVICE:  01/17/2019



SUBJECTIVE:  He remains intubated on the vent and sedated.



OBJECTIVE:  VITAL SIGNS:  Pulse is 100, blood pressure 106/74, saturations

_98%, and respiratory rate 17. 

GENERAL:  He is being dialyzed. 

CHEST:  Decreased breath sounds.  No wheezing. 

CARDIAC:  Normal S1 and S2.  No gallops. 

ABDOMEN:  No masses.



IMAGING DATA:  Chest x-ray still shows bilateral infiltrates.



LABORATORY DATA:  PO2 is 135, pCO2 of 48, pH is 7.37 on a bilevel. 



White count 11,000, H and H 8 and 25, and platelet count 178. 



BUN and creatinine 84 and 2.14.



IMPRESSION:  

1. Sepsis syndrome.

2. Respiratory failure.

3. Renal failure.

4. Encephalopathy.

5. Diabetes.



PLAN:  He is still not weanable.  He more than likely is going to need a trach 
and

PEG.  He is still on amiodarone for his SVT, full-dose Lovenox.  He has had a 
week

of hydrocortisone and dc it ,,_ neb treatments, nutrition, PT, antibiotics. 



One half hour critical time.  We will follow.







Job ID:  [id number]



[facility] Hospital[facility]

## 2019-01-17 NOTE — RAD
AP VIEW CHEST:

 

Date:  01/17/19 

 

HISTORY:  

Ventilator-dependent patient. 

 

FINDINGS:

 

Comparison made to previous exam from 01/15/19. 

 

AP view of chest demonstrates EKG leads seen over the chest. Endotracheal tube overlies the tracheal 
air column. 

 

Cardiomegaly is seen. Pulmonary vascular congestion seen. 

 

Diffuse bilateral perihilar air space opacities seen compatible with pulmonary edema or pneumonia. 

 

Small bilateral pleural effusions seen. 

 

IMPRESSION: 

Stable AP view of chest, not significantly changed since the previous exam. 

 

POS: VERONICA

## 2019-01-17 NOTE — PDOC.PN
- Subjective


Encounter Start Date: 01/17/19


Encounter Start Time: 13:40


-: non-verbal





- Objective


Resuscitation Status - Order Detail:





01/07/19 04:33


Resuscitation Status Routine 


   Resuscitation Status: FULL: Full Resuscitation








Vital Signs & Weight: 


 Vital Signs (12 hours)











  Temp Pulse Resp BP Pulse Ox


 


 01/17/19 15:41   76   158/68 H 


 


 01/17/19 14:00    17  


 


 01/17/19 12:35   66   87/43 L 


 


 01/17/19 12:00  97.8 F   17  


 


 01/17/19 10:48   72   89/42 L 


 


 01/17/19 10:00    11 L  


 


 01/17/19 08:13   90   166/74 H 


 


 01/17/19 08:00  98.0 F   12   100


 


 01/17/19 06:00    15  








 Weight











Admit Weight                   249 lb


 


Weight                         249 lb 1.957 oz











 Most Recent Monitor Data











Heart Rate from ECG            73


 


NIBP                           158/68


 


NIBP BP-Mean                   98


 


Respiration from ECG           11


 


SpO2                           95














I&O: 


 











 01/16/19 01/17/19 01/18/19





 06:59 06:59 06:59


 


Intake Total 3055.2 3348.2 180


 


Output Total 3320 1895 675


 


Balance -264.8 1453.2 -495











Result Diagrams: 


 01/17/19 04:14





 01/17/19 04:14


Additional Labs: 


 Accuchecks











  01/17/19 01/17/19 01/17/19





  12:32 08:36 04:14


 


POC Glucose  121 H  155 H  228 H














  01/17/19 01/16/19 01/16/19





  00:38 21:08 16:11


 


POC Glucose  200 H  178 H  221 H














Phys Exam





- Physical Examination


Constitutional: NAD


Intubated and sedated.  Eyes open.  Does not track.


Respiratory: no wheezing, no rales, no rhonchi, clear to auscultation bilateral


Cardiovascular: RRR, no significant murmur


Gastrointestinal: soft, no distention, positive bowel sounds


Musculoskeletal: no edema


LLE with dressed, desquamated areas.  Cellulitis improving.





Dx/Plan


(1) Cellulitis and abscess of left leg


Code(s): L03.116 - CELLULITIS OF LEFT LOWER LIMB; L02.416 - CUTANEOUS ABSCESS 

OF LEFT LOWER LIMB   Status: Acute   





(2) Group A streptococcal infection


Code(s): B95.0 - STREPTOCOCCUS, GROUP A, CAUSING DISEASES CLASSD ELSWHR   Status

: Acute   





(3) Toxic metabolic encephalopathy


Code(s): G92 - TOXIC ENCEPHALOPATHY   Status: Acute   





(4) Acute renal failure


Status: Acute   


Qualifiers: 


   Acute renal failure type: with acute tubular necrosis   Qualified Code(s): 

N17.0 - Acute kidney failure with tubular necrosis   





(5) CAD (coronary artery disease)


Code(s): I25.10 - ATHSCL HEART DISEASE OF NATIVE CORONARY ARTERY W/O ANG PCTRS 

  Status: Acute   


Qualifiers: 


   Coronary Disease-Associated Artery/Lesion type: native artery   Native vs. 

transplanted heart: native heart   Associated angina: without angina   

Qualified Code(s): I25.10 - Atherosclerotic heart disease of native coronary 

artery without angina pectoris   





(6) DKA (diabetic ketoacidoses)


Code(s): E13.10 - OTH DIABETES MELLITUS WITH KETOACIDOSIS WITHOUT COMA   Status

: Acute   





(7) DM type 2 (diabetes mellitus, type 2)


Status: Acute   


Qualifiers: 


   Diabetes mellitus long term insulin use: with long term use   Diabetes 

mellitus complication status: with kidney complications 





(8) Diastolic CHF


Code(s): I50.30 - UNSPECIFIED DIASTOLIC (CONGESTIVE) HEART FAILURE   Status: 

Acute   





(9) HTN (hypertension)


Code(s): I10 - ESSENTIAL (PRIMARY) HYPERTENSION   Status: Acute   


Qualifiers: 


   Hypertension type: essential hypertension   Qualified Code(s): I10 - 

Essential (primary) hypertension   





- Plan





* Continuing dialysis for possible uremic encephalopathy.


* continue abx.


* Vent support with Pulm.


* Anemia stable.

## 2019-01-17 NOTE — PDOC.OP
Operative Note





- Operative Note


Operative Note: 





PROCEDURE:  Right femoral hemodialysis catheter placement with ultrasound 

guidance





DATE OF PROCEDURE: 1/15/2019





SURGEON: Ananya Magallanes M.D.





PREOPERATIVE DIAGNOSES:  Renal failure





POSTOPERATIVE DIAGNOSIS:  Renal failure





HISTORY: Patient with acute renal failure with hyperkalemia and fluid overload 

who requires access for immediate dialysis. 





PROCEDURE IN DETAIL: After informed consent was obtained and an ultrasound used 

to confirm presence of a patent compressible right  femoral vein, the groin was 

prepped and draped in standard sterile fashion. Local anesthesia was infused 

over the femoral vein which was accessed under direct ultrasound guidance with 

excellent flow of dark venous nonpulsatile blood. Ultrasound was used to 

confirm the presence of the wire within the patent compressible femoral vein. A 

skin incision was made and the tract was serially dilated over the wire. The 

dialysis catheter was placed over the wire and secured to the skin with 

sutures. All ports easily aspirated and easily flushed without resistance. A 

chlorhexidine and Tegaderm dressing was placed. The patient tolerated the 

procedure well. Estimated blood loss was minimal. There were no complications. 

There were no specimens.

## 2019-01-18 LAB
ANALYZER IN CARDIO: (no result)
ANION GAP SERPL CALC-SCNC: 10 MMOL/L (ref 10–20)
BASE EXCESS STD BLDA CALC-SCNC: 3.4 MEQ/L
BASOPHILS # BLD AUTO: 0 THOU/UL (ref 0–0.2)
BASOPHILS NFR BLD AUTO: 0.3 % (ref 0–1)
BUN SERPL-MCNC: 56 MG/DL (ref 8.4–25.7)
CA-I BLDA-SCNC: 1.16 MMOL/L (ref 1.12–1.3)
CALCIUM SERPL-MCNC: 8.4 MG/DL (ref 7.8–10.44)
CHLORIDE SERPL-SCNC: 105 MMOL/L (ref 98–107)
CO2 SERPL-SCNC: 30 MMOL/L (ref 23–31)
CREAT CL PREDICTED SERPL C-G-VRATE: 66 ML/MIN (ref 70–130)
EOSINOPHIL # BLD AUTO: 0.1 THOU/UL (ref 0–0.7)
EOSINOPHIL NFR BLD AUTO: 1.4 % (ref 0–10)
GLUCOSE SERPL-MCNC: 131 MG/DL (ref 80–115)
HCO3 BLDA-SCNC: 29.3 MEQ/L (ref 22–28)
HCT VFR BLDA CALC: 24 % (ref 42–52)
HGB BLD-MCNC: 7.7 G/DL (ref 14–18)
HGB BLDA-MCNC: 8.1 G/DL (ref 14–18)
LYMPHOCYTES # BLD: 0.9 THOU/UL (ref 1.2–3.4)
LYMPHOCYTES NFR BLD AUTO: 10.9 % (ref 21–51)
MCH RBC QN AUTO: 31.6 PG (ref 27–31)
MCV RBC AUTO: 100 FL (ref 78–98)
MONOCYTES # BLD AUTO: 0.3 THOU/UL (ref 0.11–0.59)
MONOCYTES NFR BLD AUTO: 3.2 % (ref 0–10)
NEUTROPHILS # BLD AUTO: 7 THOU/UL (ref 1.4–6.5)
NEUTROPHILS NFR BLD AUTO: 84.3 % (ref 42–75)
PCO2 BLDA: 52.6 MMHG (ref 35–45)
PH BLDA: 7.36 [PH] (ref 7.35–7.45)
PLATELET # BLD AUTO: 191 THOU/UL (ref 130–400)
PO2 BLDA: 137.9 MMHG (ref 80–?)
POTASSIUM BLD-SCNC: 4.15 MMOL/L (ref 3.7–5.3)
POTASSIUM SERPL-SCNC: 4.2 MMOL/L (ref 3.5–5.1)
RBC # BLD AUTO: 2.43 MILL/UL (ref 4.7–6.1)
SODIUM SERPL-SCNC: 141 MMOL/L (ref 136–145)
SPECIMEN DRAWN FROM PATIENT: (no result)
WBC # BLD AUTO: 8.3 THOU/UL (ref 4.8–10.8)

## 2019-01-18 RX ADMIN — FENTANYL CITRATE SCH MLS: 50 INJECTION, SOLUTION INTRAMUSCULAR; INTRAVENOUS at 16:28

## 2019-01-18 RX ADMIN — SILVER SULFADIAZINE SCH APPLIC: 10 CREAM TOPICAL at 10:09

## 2019-01-18 RX ADMIN — PENICILLIN G POTASSIUM SCH MLS: 5000000 POWDER, FOR SOLUTION INTRAMUSCULAR; INTRAPLEURAL; INTRATHECAL; INTRAVENOUS at 05:58

## 2019-01-18 RX ADMIN — Medication SCH ML: at 20:42

## 2019-01-18 RX ADMIN — Medication SCH ML: at 08:48

## 2019-01-18 RX ADMIN — CLINDAMYCIN PHOSPHATE SCH MLS: 900 INJECTION, SOLUTION INTRAVENOUS at 08:46

## 2019-01-18 RX ADMIN — CLINDAMYCIN PHOSPHATE SCH MLS: 900 INJECTION, SOLUTION INTRAVENOUS at 00:38

## 2019-01-18 RX ADMIN — SILVER SULFADIAZINE SCH: 10 CREAM TOPICAL at 08:48

## 2019-01-18 RX ADMIN — PENICILLIN G POTASSIUM SCH MLS: 5000000 POWDER, FOR SOLUTION INTRAMUSCULAR; INTRAPLEURAL; INTRATHECAL; INTRAVENOUS at 17:35

## 2019-01-18 RX ADMIN — INSULIN GLARGINE SCH MLS: 100 INJECTION, SOLUTION SUBCUTANEOUS at 08:46

## 2019-01-18 RX ADMIN — CEFTRIAXONE SCH MLS: 1 INJECTION, POWDER, FOR SOLUTION INTRAMUSCULAR; INTRAVENOUS at 17:03

## 2019-01-18 RX ADMIN — FENTANYL CITRATE SCH MLS: 50 INJECTION, SOLUTION INTRAMUSCULAR; INTRAVENOUS at 04:01

## 2019-01-18 RX ADMIN — INSULIN GLARGINE SCH MLS: 100 INJECTION, SOLUTION SUBCUTANEOUS at 21:04

## 2019-01-18 NOTE — PRG
DATE OF SERVICE:  01/18/2019



SUBJECTIVE:  Mr. Bojorquez is still having a lot of agitation whenever the sedation is

tapered.  No diarrhea. 



OBJECTIVE:  VITAL SIGNS:  His T-max 98.9, blood pressure 122/54, pulse 70, still

being dialyzed. 

HEENT:  Ocular movements conjugate. 

LUNGS:  Symmetric air entry. 

HEART:  S1 and S2, regular rate. 

ABDOMEN:  Soft. 

EXTREMITIES:  Left leg is improving steadily.



LABORATORY DATA:  White cell count 8.3, hemoglobin 7.7, platelets 191.  Chemistry

with a creatinine of 1.74.  Chest x-ray with patchy interstitial alveolar opacities. 



ASSESSMENT AND DISCUSSION:  Strep pyogenes bacteremia with severe cellulitis of left

lower extremity, which is steadily improving.  The patient developed multiorgan

dysfunction and has required hemodialysis, still having delirium requiring sedation.

 We will discontinue clindamycin and penicillin, switch him to Rocephin 1 g daily

few more days and then discontinue antimicrobial therapy. 







Job ID:  [id number]

## 2019-01-18 NOTE — PRG
DATE OF SERVICE:  01/18/2019



SUBJECTIVE:  Mr. Bojorquez is a 68-year-old man with recent bullous cellulitis

involving the left lower extremity. 



The patient remains on mechanical ventilator support. 



He is receiving no vasopressor or inotropic support. 



The left lower extremity wound is healing. 



The thigh and calf are soft.  No evidence of compartment. 



The remainder of the left lower extremity wound is without any active purulence. 



The surrounding erythema is resolving. 



The patient has 2+ radial and pedal pulses in all extremities. 



There is no acute surgical indication for this patient at this time. 



I recommend ongoing local wound care per wound care nursing. 



General surgeon will sign off and be available to re-evaluate the patient on demand.







Job ID:  [id number]

## 2019-01-18 NOTE — PDOC.PN
- Subjective


Encounter Start Date: 01/18/19


Encounter Start Time: 08:50


-: non-verbal





- Objective


Resuscitation Status - Order Detail:





01/07/19 04:33


Resuscitation Status Routine 


   Resuscitation Status: FULL: Full Resuscitation








Vital Signs & Weight: 


 Vital Signs (12 hours)











  Temp Pulse Resp BP Pulse Ox


 


 01/18/19 10:45   69   113/48 L 


 


 01/18/19 10:00    12  


 


 01/18/19 08:00  98.8 F   11 L   100


 


 01/18/19 07:57   75   142/52 H 


 


 01/18/19 07:51   72   143/52 H 


 


 01/18/19 06:00    12  


 


 01/18/19 04:00  98.9 F   12  


 


 01/18/19 02:20   70   


 


 01/18/19 02:00    13  








 Weight











Admit Weight                   249 lb


 


Weight                         249 lb 1.957 oz











 Most Recent Monitor Data











Heart Rate from ECG            69


 


NIBP                           112/50


 


NIBP BP-Mean                   70


 


Respiration from ECG           4


 


SpO2                           99














I&O: 


 











 01/17/19 01/18/19 01/19/19





 06:59 06:59 06:59


 


Intake Total 3348.2 3367.2 175


 


Output Total 1895 1630 370


 


Balance 1453.2 1737.2 -195











Result Diagrams: 


 01/18/19 03:56





 01/18/19 03:56


Additional Labs: 


 Accuchecks











  01/18/19 01/18/19 01/18/19





  11:35 03:46 00:36


 


POC Glucose  101  127 H  133 H














  01/17/19 01/17/19





  20:26 17:18


 


POC Glucose  126 H  168 H














Phys Exam





- Physical Examination


Constitutional: NAD


Intubated.  Slightly sedated. Moves, but does not make eye contact or track


Respiratory: no wheezing, no rales, no rhonchi, clear to auscultation bilateral


Cardiovascular: RRR, no significant murmur, no rub


Gastrointestinal: soft, non-tender, no distention, positive bowel sounds


Musculoskeletal: no edema


LLE wrapped.  Well demarcated denuded area without erythema halo.





Dx/Plan


(1) Cellulitis and abscess of left leg


Code(s): L03.116 - CELLULITIS OF LEFT LOWER LIMB; L02.416 - CUTANEOUS ABSCESS 

OF LEFT LOWER LIMB   Status: Acute   





(2) Group A streptococcal infection


Code(s): B95.0 - STREPTOCOCCUS, GROUP A, CAUSING DISEASES CLASSD ELSWHR   Status

: Acute   





(3) Toxic metabolic encephalopathy


Code(s): G92 - TOXIC ENCEPHALOPATHY   Status: Acute   





(4) Acute renal failure


Status: Acute   


Qualifiers: 


   Acute renal failure type: with acute tubular necrosis   Qualified Code(s): 

N17.0 - Acute kidney failure with tubular necrosis   





(5) CAD (coronary artery disease)


Code(s): I25.10 - ATHSCL HEART DISEASE OF NATIVE CORONARY ARTERY W/O ANG PCTRS 

  Status: Acute   


Qualifiers: 


   Coronary Disease-Associated Artery/Lesion type: native artery   Native vs. 

transplanted heart: native heart   Associated angina: without angina   

Qualified Code(s): I25.10 - Atherosclerotic heart disease of native coronary 

artery without angina pectoris   





(6) DKA (diabetic ketoacidoses)


Code(s): E13.10 - OTH DIABETES MELLITUS WITH KETOACIDOSIS WITHOUT COMA   Status

: Resolved   





(7) DM type 2 (diabetes mellitus, type 2)


Status: Acute   


Qualifiers: 


   Diabetes mellitus long term insulin use: with long term use   Diabetes 

mellitus complication status: with kidney complications 





(8) Diastolic CHF


Code(s): I50.30 - UNSPECIFIED DIASTOLIC (CONGESTIVE) HEART FAILURE   Status: 

Acute   





(9) HTN (hypertension)


Code(s): I10 - ESSENTIAL (PRIMARY) HYPERTENSION   Status: Acute   


Qualifiers: 


   Hypertension type: essential hypertension   Qualified Code(s): I10 - 

Essential (primary) hypertension   





- Plan





* continuing on abx, but infection appears to be largely resolved now.


* continue HD for renal failure.


* encephalopathy does not appear to be clearing with resolution of the 

infection and clearing of the uremia.


* may need track, PEG.

## 2019-01-18 NOTE — PRG
DATE OF SERVICE:  01/18/2019



RENAL MEDICINE.



SUBJECTIVE:  Mr. Bojorquez is a 68-year-old white male, who was admitted for

sepsis/bullous left leg cellulitis and seen by the Renal Service for his acute

kidney injury.  He was initiated on dialysis for volume overload and for uremic

signs and symptoms-mental status change.  He has been undergoing daily dialysis.

The plan today is to do a 4-hour dialysis with fluid removal as tolerated by the

patient. 



No acute events noted.



OBJECTIVE:  VITAL SIGNS:  Blood pressure 142/57, heart rate 75, respiratory rate 12,

pulse ox 100%. 

GENERAL:  The patient is awake, but not following commands, intubating with

ventilator support. 

HEENT:  He has slightly pale conjunctivae.  Anicteric sclerae.  No neck mass.  No

carotid bruits.  No JVD. 

CHEST:  No deformities. 

LUNGS:  Decreased breath sounds. 

HEART:  Normal sinus rhythm.  No murmur.  No gallops or rubs. 

ABDOMEN:  Globular, soft, nontender.  No masses. 

EXTREMITIES:  Positive for edema.  Positive for left leg bullous cellulitis.



MEDICATIONS:  Medications of January 18, 2019 was reviewed.



LABORATORY DATA:  Laboratories of January 18, 2019, white count 8.3, hemoglobin 7.7,

sodium 141, potassium 4.2, chloride 105, carbon dioxide 30, BUN 56, creatinine 1.74,

glucose 131, calcium 8.4. 



ASSESSMENT AND PLAN:  

1. Acute kidney injury - Secondary to ischemic acute tubular necrosis, continuing

daily dialysis with this patient.  Fluid removal as tolerated by the patient.  We

will attempt a 4-hour hemodialysis today with maximal fluid removal. 

2. Mental status change - This is secondary to metabolic encephalopathy possibly 

from his sepsis.

3. Sepsis - The patient is currently on antibiotics.

4. Acute respiratory failure - Still dependent on ventilator support. 

Overall prognosis remains guarded.







Job ID:  [id number]

## 2019-01-18 NOTE — RAD
CHEST 1 VIEW:

 

HISTORY: 

Respiratory insufficiency, patient on ventilator.

 

COMPARISON: 

1/17/2019.

 

FINDINGS: 

Monitor leads overlie the chest.  Endotracheal tube in position.  There are some patchy alveolar and 
interstitial parenchymal changes, particularly in the perihilar regions with small pleural effusions.
  Appearance is stable to possibly slightly improved from most recent prior studies.  No new confluen
t process.  No pneumothorax.

 

IMPRESSION: 

Persistent overall stable bilateral patchy interstitial and alveolar opacities and pleural effusion c
hanggraham.

 

POS: [facility]

## 2019-01-18 NOTE — PRG
DATE OF SERVICE:  01/18/2019



SUBJECTIVE:  This morning he remains intubated in the vent, slightly sedated, still

has some __________.  His x-ray is much improved.  Minimal infiltrates. 



OBJECTIVE:  VITAL SIGNS:  Sats are 100% on 40% FiO2, respirations 12, pulse 80, he

is afebrile.  I's and O's are good.  He is being dialyzed. 

CHEST:  Decreased breath sounds, no wheezing. CARDIAC:  Normal S1 and S2.  No

gallops. 

ABDOMEN:  No masses.



DIAGNOSTIC DATA:  White count 8000.  H and H 7 and 24.  Platelet count 191. pO2 is

137, pCO2 is __________.  Creatinine is 1.74, BUN is 56. 



IMPRESSION:  

1. Metabolic encephalopathy.

2. Sepsis syndrome.

3. Left leg cellulitis, possibly streptococcus.

4. His renal failure improving.

5. Diabetes.



PLAN:  

1. Continue clindamycin, penicillin.

2. Try and wean once his encephalopathy improves.

3. He is likely going to require trach and PEG.  He is day #11 on the vent. 

One half hour critical time.







Job ID:  [id number]

## 2019-01-19 LAB
ANALYZER IN CARDIO: (no result)
ANION GAP SERPL CALC-SCNC: 9 MMOL/L (ref 10–20)
BASE EXCESS STD BLDA CALC-SCNC: 4.9 MEQ/L
BASOPHILS # BLD AUTO: 0 THOU/UL (ref 0–0.2)
BASOPHILS NFR BLD AUTO: 0.3 % (ref 0–1)
BUN SERPL-MCNC: 38 MG/DL (ref 8.4–25.7)
CA-I BLDA-SCNC: 1.17 MMOL/L (ref 1.12–1.3)
CALCIUM SERPL-MCNC: 8.3 MG/DL (ref 7.8–10.44)
CHLORIDE SERPL-SCNC: 104 MMOL/L (ref 98–107)
CO2 SERPL-SCNC: 28 MMOL/L (ref 23–31)
CREAT CL PREDICTED SERPL C-G-VRATE: 76 ML/MIN (ref 70–130)
EOSINOPHIL # BLD AUTO: 0.1 THOU/UL (ref 0–0.7)
EOSINOPHIL NFR BLD AUTO: 1.1 % (ref 0–10)
GLUCOSE SERPL-MCNC: 138 MG/DL (ref 80–115)
HCO3 BLDA-SCNC: 30.6 MEQ/L (ref 22–28)
HCT VFR BLDA CALC: 24 % (ref 42–52)
HGB BLD-MCNC: 8 G/DL (ref 14–18)
HGB BLDA-MCNC: 8.3 G/DL (ref 14–18)
LYMPHOCYTES # BLD: 0.8 THOU/UL (ref 1.2–3.4)
LYMPHOCYTES NFR BLD AUTO: 7.3 % (ref 21–51)
MCH RBC QN AUTO: 32 PG (ref 27–31)
MCV RBC AUTO: 99.4 FL (ref 78–98)
MONOCYTES # BLD AUTO: 0.4 THOU/UL (ref 0.11–0.59)
MONOCYTES NFR BLD AUTO: 3.3 % (ref 0–10)
NEUTROPHILS # BLD AUTO: 9.3 THOU/UL (ref 1.4–6.5)
NEUTROPHILS NFR BLD AUTO: 88 % (ref 42–75)
O2 A-A PPRESDIFF RESPIRATORY: 73.8 MM[HG] (ref 0–20)
PCO2 BLDA: 52.2 MMHG (ref 35–45)
PH BLDA: 7.39 [PH] (ref 7.35–7.45)
PLATELET # BLD AUTO: 202 THOU/UL (ref 130–400)
PO2 BLDA: 110.5 MMHG (ref 80–?)
POTASSIUM BLD-SCNC: 4.1 MMOL/L (ref 3.7–5.3)
POTASSIUM SERPL-SCNC: 4.1 MMOL/L (ref 3.5–5.1)
RBC # BLD AUTO: 2.49 MILL/UL (ref 4.7–6.1)
SODIUM SERPL-SCNC: 137 MMOL/L (ref 136–145)
SPECIMEN DRAWN FROM PATIENT: (no result)
WBC # BLD AUTO: 10.6 THOU/UL (ref 4.8–10.8)

## 2019-01-19 RX ADMIN — FENTANYL CITRATE SCH MLS: 50 INJECTION, SOLUTION INTRAMUSCULAR; INTRAVENOUS at 17:06

## 2019-01-19 RX ADMIN — INSULIN GLARGINE SCH MLS: 100 INJECTION, SOLUTION SUBCUTANEOUS at 08:35

## 2019-01-19 RX ADMIN — INSULIN GLARGINE SCH MLS: 100 INJECTION, SOLUTION SUBCUTANEOUS at 21:26

## 2019-01-19 RX ADMIN — FENTANYL CITRATE SCH MLS: 50 INJECTION, SOLUTION INTRAMUSCULAR; INTRAVENOUS at 02:40

## 2019-01-19 RX ADMIN — Medication SCH ML: at 21:29

## 2019-01-19 RX ADMIN — SILVER SULFADIAZINE SCH APPLIC: 10 CREAM TOPICAL at 21:28

## 2019-01-19 RX ADMIN — Medication SCH ML: at 08:36

## 2019-01-19 RX ADMIN — CEFTRIAXONE SCH MLS: 1 INJECTION, POWDER, FOR SOLUTION INTRAMUSCULAR; INTRAVENOUS at 14:37

## 2019-01-19 RX ADMIN — SILVER SULFADIAZINE SCH APPLIC: 10 CREAM TOPICAL at 08:36

## 2019-01-19 NOTE — PRG
DATE OF SERVICE:  01/19/2019



SUBJECTIVE:  Mr. Bojorquez remains encephalopathic.  He moves all of his 
extremities.



OBJECTIVE:  VITAL SIGNS:  Heart rate is 82, respiratory rate 14, oximetry is 97%
,

blood pressure 131/52. 

LUNGS:  Clear. 

HEART:  Regular rhythm.  S1, S2 normal. 

ABDOMEN:  Soft and nontender. EXTREMITIES:  Without clubbing, cyanosis or edema.



LABORATORY DATA:  White count 10.6, hemoglobin 8.0, platelets 202,000.  Sodium 
137,

potassium 4.1, chloride 104, bicarb 28, BUN 38, creatinine 1.5.  pH 7.39, CO2 52
,

pO2 110. 



IMPRESSION:  

1. Respiratory failure associated with streptococcus pyogenes, cellulitis and 
soft

tissue infection in his left lower extremity. 

2. Acute renal failure associated with his sepsis.

3. Diabetes.  He checks his sugar according to his wife, maybe once daily.  I

suspect his diabetes is poorly controlled.  He had symptoms for several days 
prior

to admission.  Fortunately, he appears to be stabilizing.  His encephalopathy 
most

likely is related to his critical illness.  He was conversant according to his 
wife

for the first 3 days, he was here and then clinically deteriorated. 

I suspect his encephalopathy will gradually improve.  I feel we do not need 
neurology input

or imaging of his brain at this point in time, he unlikely has encephalitis or

structural brain abnormality given the history provided.  This is almost 
expected

with the critical illness of this nature. 



I would recommend a tracheostomy and a PEG tube.  Surgery is being consulted.   



Critical care time 30 minutes.







Job ID:  [id number]



MTDD

## 2019-01-19 NOTE — RAD
PORTABLE CHEST:

 

Date:  01/19/19 

 

PROVIDED CLINICAL HISTORY:   

Respiratory insufficiency. 

 

FINDINGS:

 

Comparison with 01/18/19. 

 

Examination is rotated, limiting assessment. Given this limitation, significant interval change with 
respect to the prior examination is not apparent. 

 

IMPRESSION: 

As above.

 

POS: [facility]

## 2019-01-19 NOTE — PDOC.PN
- Subjective


Encounter Start Date: 01/19/19


Encounter Start Time: 11:10





No significant change.  





- Objective


Resuscitation Status - Order Detail:





01/07/19 04:33


Resuscitation Status Routine 


   Resuscitation Status: FULL: Full Resuscitation








Vital Signs & Weight: 


 Vital Signs (12 hours)











  Temp Pulse Pulse Pulse Resp BP BP


 


 01/19/19 19:09   82    14  


 


 01/19/19 18:00      16  


 


 01/19/19 16:00  99 F     17  


 


 01/19/19 14:38   79     


 


 01/19/19 14:00      12  


 


 01/19/19 13:00   78     114/58 L 


 


 01/19/19 12:00  98.1 F     16  


 


 01/19/19 11:00   73     111/46 L 


 


 01/19/19 10:44    75  72    120/50 L


 


 01/19/19 10:00      14  


 


 01/19/19 08:08   81     125/62 


 


 01/19/19 08:00  98.9 F     17  














  BP Pulse Ox Pulse Ox Pulse Ox


 


 01/19/19 19:09   97  


 


 01/19/19 18:00    


 


 01/19/19 16:00    


 


 01/19/19 14:38    


 


 01/19/19 14:00    


 


 01/19/19 13:00    


 


 01/19/19 12:00    


 


 01/19/19 11:00    


 


 01/19/19 10:44  111/48 L   100  100


 


 01/19/19 10:00    


 


 01/19/19 08:08    


 


 01/19/19 08:00   97  








 Weight











Admit Weight                   249 lb


 


Weight                         249 lb 1.957 oz











 Most Recent Monitor Data











Heart Rate from ECG            81


 


NIBP                           136/58


 


NIBP BP-Mean                   84


 


Respiration from ECG           13


 


SpO2                           98














I&O: 


 











 01/18/19 01/19/19 01/20/19





 06:59 06:59 06:59


 


Intake Total 3367.2 2069.1 1017.2


 


Output Total 1630 1704 890


 


Balance 1737.2 365.1 127.2











Result Diagrams: 


 01/19/19 04:00





 01/19/19 04:00


Additional Labs: 


 Accuchecks











  01/19/19 01/19/19 01/19/19





  16:34 11:31 08:04


 


POC Glucose  151 H  150 H  140 H














  01/19/19 01/18/19





  02:32 20:45


 


POC Glucose  128 H  125 H














Phys Exam





- Physical Examination


Constitutional: NAD


Intubated.  Encephalopathic


Respiratory: no wheezing, no rales, no rhonchi, clear to auscultation bilateral


Cardiovascular: RRR, no significant murmur, no rub


Gastrointestinal: soft, no distention, positive bowel sounds


Musculoskeletal: no edema


Left leg with well demarcated area in full-thickness debridement healing





Dx/Plan


(1) Cellulitis and abscess of left leg


Code(s): L03.116 - CELLULITIS OF LEFT LOWER LIMB; L02.416 - CUTANEOUS ABSCESS 

OF LEFT LOWER LIMB   Status: Acute   





(2) Group A streptococcal infection


Code(s): B95.0 - STREPTOCOCCUS, GROUP A, CAUSING DISEASES CLASSD ELSWHR   Status

: Acute   





(3) Toxic metabolic encephalopathy


Code(s): G92 - TOXIC ENCEPHALOPATHY   Status: Acute   





(4) Acute renal failure


Status: Acute   


Qualifiers: 


   Acute renal failure type: with acute tubular necrosis   Qualified Code(s): 

N17.0 - Acute kidney failure with tubular necrosis   





(5) CAD (coronary artery disease)


Code(s): I25.10 - ATHSCL HEART DISEASE OF NATIVE CORONARY ARTERY W/O ANG PCTRS 

  Status: Acute   


Qualifiers: 


   Coronary Disease-Associated Artery/Lesion type: native artery   Native vs. 

transplanted heart: native heart   Associated angina: without angina   

Qualified Code(s): I25.10 - Atherosclerotic heart disease of native coronary 

artery without angina pectoris   





(6) DKA (diabetic ketoacidoses)


Code(s): E13.10 - OTH DIABETES MELLITUS WITH KETOACIDOSIS WITHOUT COMA   Status

: Resolved   





(7) DM type 2 (diabetes mellitus, type 2)


Status: Acute   


Qualifiers: 


   Diabetes mellitus long term insulin use: with long term use   Diabetes 

mellitus complication status: with kidney complications 





(8) Diastolic CHF


Code(s): I50.30 - UNSPECIFIED DIASTOLIC (CONGESTIVE) HEART FAILURE   Status: 

Acute   





(9) HTN (hypertension)


Code(s): I10 - ESSENTIAL (PRIMARY) HYPERTENSION   Status: Acute   


Qualifiers: 


   Hypertension type: essential hypertension   Qualified Code(s): I10 - 

Essential (primary) hypertension   





- Plan





* Continue vent support.


* Will likely need trach and PEG soon.


* Holding HD today.  Numbers better


* No significant improvement with encephalopathy with HD.

## 2019-01-19 NOTE — PRG
DATE OF SERVICE:  01/19/2019



Renal Medicine



SUBJECTIVE:  Mr. Bojorquez is a 68-year-old white male, who was admitted for sepsis

and developed acute kidney injury.  He became volume overloaded, had mental status

change secondary to renal.  For that reason, hemodialysis have been initiated.  He

has been tolerating the said dialysis. 



He is still confused.



OBJECTIVE:  VITAL SIGNS:  Blood pressure is 91/43, heart rate 77, respiratory rate

14, and pulse ox 100%. 

GENERAL:  The patient is confused, but awake, intubated on ventilator support. 

SKIN:  Adequate turgor. 

HEENT:  He has slightly pale conjunctivae.  Anicteric sclerae. 

NECK:  No neck mass.  No carotid bruits.  No JVD. 

CHEST:  No deformities. 

LUNGS:  Decreased breath sounds. 

HEART:  Normal sinus rhythm.  No murmur.  No gallops.  No rubs. 

ABDOMEN:  Globular, soft, and nontender.  No masses. 

EXTREMITIES:  Trace edema.



MEDICATIONS:  Medications of January 19, 2019, was reviewed.



LABORATORY DATA:  Laboratories of January 19, 2019, sodium 137, potassium 4.1,

chloride 104, carbon dioxide 28, BUN 38, creatinine 1.5, glucose 138, and calcium

8.3.  White count 10.6, hemoglobin 8, and hematocrit 24.8. 



ASSESSMENT AND PLAN:  

1. No acute kidney injury - secondary to presumed ischemic acute tubular necrosis.  

The patient underwent a 4-hour hemodialysis yesterday.  We will hold off dialysis

today.  Continue to observe for possible renal recovery. 

2. Anemia, p.r.n. blood transfusion.

3. Sepsis, on empiric antibiotics.







Job ID:  [id number]

## 2019-01-20 LAB
ANALYZER IN CARDIO: (no result)
ANION GAP SERPL CALC-SCNC: 10 MMOL/L (ref 10–20)
BASE EXCESS STD BLDA CALC-SCNC: 3 MEQ/L
BASOPHILS # BLD AUTO: 0 THOU/UL (ref 0–0.2)
BASOPHILS NFR BLD AUTO: 0.3 % (ref 0–1)
BUN SERPL-MCNC: 39 MG/DL (ref 8.4–25.7)
CA-I BLDA-SCNC: 1.21 MMOL/L (ref 1.12–1.3)
CALCIUM SERPL-MCNC: 7.8 MG/DL (ref 7.8–10.44)
CHLORIDE SERPL-SCNC: 110 MMOL/L (ref 98–107)
CO2 SERPL-SCNC: 24 MMOL/L (ref 23–31)
CREAT CL PREDICTED SERPL C-G-VRATE: 74 ML/MIN (ref 70–130)
EOSINOPHIL # BLD AUTO: 0.1 THOU/UL (ref 0–0.7)
EOSINOPHIL NFR BLD AUTO: 1.1 % (ref 0–10)
GLUCOSE SERPL-MCNC: 111 MG/DL (ref 80–115)
HCO3 BLDA-SCNC: 27.4 MEQ/L (ref 22–28)
HCT VFR BLDA CALC: 24 % (ref 42–52)
HGB BLD-MCNC: 7 G/DL (ref 14–18)
HGB BLDA-MCNC: 8.1 G/DL (ref 14–18)
LYMPHOCYTES # BLD: 0.8 THOU/UL (ref 1.2–3.4)
LYMPHOCYTES NFR BLD AUTO: 7.9 % (ref 21–51)
MCH RBC QN AUTO: 31.8 PG (ref 27–31)
MCV RBC AUTO: 98.9 FL (ref 78–98)
MONOCYTES # BLD AUTO: 0.3 THOU/UL (ref 0.11–0.59)
MONOCYTES NFR BLD AUTO: 3.3 % (ref 0–10)
NEUTROPHILS # BLD AUTO: 8.4 THOU/UL (ref 1.4–6.5)
NEUTROPHILS NFR BLD AUTO: 87.4 % (ref 42–75)
O2 A-A PPRESDIFF RESPIRATORY: 86.17 MM[HG] (ref 0–20)
PCO2 BLDA: 41.1 MMHG (ref 35–45)
PH BLDA: 7.44 [PH] (ref 7.35–7.45)
PLATELET # BLD AUTO: 210 THOU/UL (ref 130–400)
PO2 BLDA: 112 MMHG (ref 80–?)
POTASSIUM BLD-SCNC: 4.17 MMOL/L (ref 3.7–5.3)
POTASSIUM SERPL-SCNC: 3.8 MMOL/L (ref 3.5–5.1)
RBC # BLD AUTO: 2.19 MILL/UL (ref 4.7–6.1)
SODIUM SERPL-SCNC: 140 MMOL/L (ref 136–145)
SPECIMEN DRAWN FROM PATIENT: (no result)
WBC # BLD AUTO: 9.7 THOU/UL (ref 4.8–10.8)

## 2019-01-20 PROCEDURE — 30233N1 TRANSFUSION OF NONAUTOLOGOUS RED BLOOD CELLS INTO PERIPHERAL VEIN, PERCUTANEOUS APPROACH: ICD-10-PCS | Performed by: SURGERY

## 2019-01-20 RX ADMIN — INSULIN GLARGINE SCH MLS: 100 INJECTION, SOLUTION SUBCUTANEOUS at 21:17

## 2019-01-20 RX ADMIN — SILVER SULFADIAZINE SCH APPLIC: 10 CREAM TOPICAL at 21:18

## 2019-01-20 RX ADMIN — Medication SCH ML: at 21:18

## 2019-01-20 RX ADMIN — CEFTRIAXONE SCH MLS: 1 INJECTION, POWDER, FOR SOLUTION INTRAMUSCULAR; INTRAVENOUS at 14:29

## 2019-01-20 RX ADMIN — FENTANYL CITRATE SCH MLS: 50 INJECTION, SOLUTION INTRAMUSCULAR; INTRAVENOUS at 13:02

## 2019-01-20 RX ADMIN — Medication SCH ML: at 08:48

## 2019-01-20 RX ADMIN — INSULIN GLARGINE SCH MLS: 100 INJECTION, SOLUTION SUBCUTANEOUS at 09:05

## 2019-01-20 RX ADMIN — SILVER SULFADIAZINE SCH APPLIC: 10 CREAM TOPICAL at 09:20

## 2019-01-20 NOTE — PRG
DATE OF SERVICE:  01/20/2019



SUBJECTIVE:  Mr. Bojorquez remains hemodynamically stable.  He still encephalopathic.

He does open his eyes, but he will not make eye contact or follow commands. 



OBJECTIVE:  VITAL SIGNS:  Heart rate is 80, blood pressure 139/60, and respiratory

rates in the teens. 

GENERAL:  He is still on propofol and fentanyl.  I wean off propofol today and start

Precedex.  Hopefully, his encephalopathy will gradually improve.  He is tentatively

on the schedule for tracheostomy and PEG. 

LUNGS:  Clear. 

HEART:  Regular rhythm. 

ABDOMEN:  Soft.



LABORATORY DATA:  His hemoglobin is down to 7 g today.  He will receive blood with

dialysis or if he is not having dialysis, he still received blood today.

Electrolytes are unremarkable.  Potassium is 3.8, BUN 39, and creatinine is 1.55. 



Intake and output positive 71 mL. 



Urine output was 1790 mL. 



Blood gas today shows pH of 7.44, CO2 of 41, pO2 of 112. 



His ventilatory rate will be turned down to 6.  I met with family and answered all

their questions. 



Critical care time 30 minutes.







Job ID:  [id number]

## 2019-01-20 NOTE — PDOC.PN
- Subjective


Encounter Start Date: 01/20/19


Encounter Start Time: 09:20


-: non-verbal





- Objective


Resuscitation Status - Order Detail:





01/07/19 04:33


Resuscitation Status Routine 


   Resuscitation Status: FULL: Full Resuscitation








Vital Signs & Weight: 


 Vital Signs (12 hours)











  Temp Pulse Pulse Resp BP BP Pulse Ox


 


 01/20/19 15:21   85    115/53 L  


 


 01/20/19 15:00  98.6 F      


 


 01/20/19 13:45  99.6 F   87  18   119/55 L  96


 


 01/20/19 13:30  100.2 F H   89  15   124/50 L  94 L


 


 01/20/19 13:10   101 H    145/66 H  


 


 01/20/19 12:00  99.6 F      


 


 01/20/19 10:50   79     


 


 01/20/19 08:02   81    139/60  


 


 01/20/19 07:00  98.0 F      


 


 01/20/19 06:00     16   


 


 01/20/19 05:00  98.4 F      








 Weight











Admit Weight                   249 lb


 


Weight                         249 lb 1.957 oz











 Most Recent Monitor Data











Heart Rate from ECG            83


 


NIBP                           115/53


 


NIBP BP-Mean                   73


 


Respiration from ECG           16


 


SpO2                           98














I&O: 


 











 01/19/19 01/20/19 01/21/19





 06:59 06:59 06:59


 


Intake Total 2069.1 1861.2 169


 


Output Total 1704 1790 1155


 


Balance 365.1 71.2 -986











Result Diagrams: 


 01/20/19 05:25





 01/20/19 05:25


Additional Labs: 


 Accuchecks











  01/20/19 01/20/19 01/20/19





  12:56 05:25 01:05


 


POC Glucose  113 H  110  127 H














  01/19/19 01/19/19





  20:55 16:34


 


POC Glucose  133 H  151 H














Phys Exam





- Physical Examination


Constitutional: NAD


Still encephalopathic, intubated, sedate.


Respiratory: no wheezing, no rales, no rhonchi, clear to auscultation bilateral


Cardiovascular: RRR, no significant murmur, no rub


Gastrointestinal: soft, no distention, positive bowel sounds


Musculoskeletal: no edema


Skin: normal turgor


Deviation from normal: Mild diffuse edema.





Dx/Plan


(1) Cellulitis and abscess of left leg


Code(s): L03.116 - CELLULITIS OF LEFT LOWER LIMB; L02.416 - CUTANEOUS ABSCESS 

OF LEFT LOWER LIMB   Status: Acute   





(2) Group A streptococcal infection


Code(s): B95.0 - STREPTOCOCCUS, GROUP A, CAUSING DISEASES CLASSD ELSWHR   Status

: Acute   





(3) Toxic metabolic encephalopathy


Code(s): G92 - TOXIC ENCEPHALOPATHY   Status: Acute   





(4) Acute renal failure


Status: Acute   


Qualifiers: 


   Acute renal failure type: with acute tubular necrosis   Qualified Code(s): 

N17.0 - Acute kidney failure with tubular necrosis   





(5) CAD (coronary artery disease)


Code(s): I25.10 - ATHSCL HEART DISEASE OF NATIVE CORONARY ARTERY W/O ANG PCTRS 

  Status: Acute   


Qualifiers: 


   Coronary Disease-Associated Artery/Lesion type: native artery   Native vs. 

transplanted heart: native heart   Associated angina: without angina   

Qualified Code(s): I25.10 - Atherosclerotic heart disease of native coronary 

artery without angina pectoris   





(6) DKA (diabetic ketoacidoses)


Code(s): E13.10 - OTH DIABETES MELLITUS WITH KETOACIDOSIS WITHOUT COMA   Status

: Resolved   





(7) DM type 2 (diabetes mellitus, type 2)


Status: Acute   


Qualifiers: 


   Diabetes mellitus long term insulin use: with long term use   Diabetes 

mellitus complication status: with kidney complications 





(8) Diastolic CHF


Code(s): I50.30 - UNSPECIFIED DIASTOLIC (CONGESTIVE) HEART FAILURE   Status: 

Acute   





(9) HTN (hypertension)


Code(s): I10 - ESSENTIAL (PRIMARY) HYPERTENSION   Status: Acute   


Qualifiers: 


   Hypertension type: essential hypertension   Qualified Code(s): I10 - 

Essential (primary) hypertension   





- Plan





* trach and PEG today. 


* Transfusion today.


* Discussed with patient's wife and daughter.

## 2019-01-20 NOTE — PRG
DATE OF SERVICE:  01/20/2019



Renal Medicine.



SUBJECTIVE:  Mr. Bojorquez is a 68-year-old white male, who was admitted for sepsis

secondary to left leg cellulitis/bullous cellulitis and developed an acute kidney

injury.  This is most likely ischemic ATN.  He has undergone daily dialysis in the

last several days.  He has some increase urine output.  He continues to be

encephalopathic.  The encephalopathy is most likely multifactorial etiology.  Uremia

was a consideration. 



OBJECTIVE:  VITAL SIGNS:  Blood pressure is 139/80, heart rate 81, respiratory rate

15, and pulse ox 98%. 

GENERAL:  The patient is sedated, intubated, on ventilator support.  Obese. 

SKIN:  Adequate turgor. 

HEENT:  He has pale conjunctivae.  Anicteric sclerae.  No neck mass.  No carotid

bruits.  No JVD. 

CHEST:  No deformities. 

LUNGS:  Decreased breath sounds.  No wheezing. 

HEART:  Normal sinus rhythm.  No murmur.  No gallops.  No rubs. 

ABDOMEN:  Globular, soft, nontender.  No masses. 

EXTREMITIES:  Positive for edema.  Positive for left leg cellulitis.



LABORATORY DATA:  Laboratories January 20, 2019, white count 9.7, hemoglobin 7,

sodium 140, potassium 3.8, chloride 110, carbon dioxide 24, BUN 39, creatinine 1.55,

glucose 111, calcium 7.8. 



ASSESSMENT AND PLAN:  

1. Acute kidney injury, secondary to presumed ischemic acute tubular necrosis.

Stabilizing renal function.  Holding off dialysis today.  Re-evaluate in a.m. 

2. Decreased mentation, most likely from metabolic encephalopathy.  This is

multifactorial etiology. 

3. Sepsis, on antibiotics.  Continue supportive care.

4. Acute respiratory failure, still on a ventilator.  Planned PEG tube as well as

tracheostomy has been considered. 

5. Anemia.  We will transfuse 1 unit of packed RBC today.

6. Overall, I agree with current management.







Job ID:  [id number]

## 2019-01-21 LAB
ANALYZER IN CARDIO: (no result)
ANION GAP SERPL CALC-SCNC: 14 MMOL/L (ref 10–20)
BASE EXCESS STD BLDA CALC-SCNC: 1 MEQ/L
BASOPHILS # BLD AUTO: 0 THOU/UL (ref 0–0.2)
BASOPHILS NFR BLD AUTO: 0.2 % (ref 0–1)
BUN SERPL-MCNC: 49 MG/DL (ref 8.4–25.7)
CA-I BLDA-SCNC: 1.21 MMOL/L (ref 1.12–1.3)
CALCIUM SERPL-MCNC: 8.5 MG/DL (ref 7.8–10.44)
CHLORIDE SERPL-SCNC: 109 MMOL/L (ref 98–107)
CO2 SERPL-SCNC: 24 MMOL/L (ref 23–31)
CREAT CL PREDICTED SERPL C-G-VRATE: 53 ML/MIN (ref 70–130)
EOSINOPHIL # BLD AUTO: 0.1 THOU/UL (ref 0–0.7)
EOSINOPHIL NFR BLD AUTO: 1.2 % (ref 0–10)
GLUCOSE SERPL-MCNC: 115 MG/DL (ref 80–115)
HCO3 BLDA-SCNC: 25.2 MEQ/L (ref 22–28)
HCT VFR BLDA CALC: 23 % (ref 42–52)
HGB BLD-MCNC: 7.6 G/DL (ref 14–18)
HGB BLDA-MCNC: 7.9 G/DL (ref 14–18)
LYMPHOCYTES # BLD: 0.6 THOU/UL (ref 1.2–3.4)
LYMPHOCYTES NFR BLD AUTO: 7.8 % (ref 21–51)
MCH RBC QN AUTO: 31.2 PG (ref 27–31)
MCV RBC AUTO: 97.5 FL (ref 78–98)
MONOCYTES # BLD AUTO: 0.3 THOU/UL (ref 0.11–0.59)
MONOCYTES NFR BLD AUTO: 4.6 % (ref 0–10)
NEUTROPHILS # BLD AUTO: 6.4 THOU/UL (ref 1.4–6.5)
NEUTROPHILS NFR BLD AUTO: 86.2 % (ref 42–75)
O2 A-A PPRESDIFF RESPIRATORY: 99.1 MM[HG] (ref 0–20)
PCO2 BLDA: 37.8 MMHG (ref 35–45)
PH BLDA: 7.44 [PH] (ref 7.35–7.45)
PLATELET # BLD AUTO: 234 THOU/UL (ref 130–400)
PO2 BLDA: 103.2 MMHG (ref 80–?)
POTASSIUM BLD-SCNC: 4.01 MMOL/L (ref 3.7–5.3)
POTASSIUM SERPL-SCNC: 4.1 MMOL/L (ref 3.5–5.1)
RBC # BLD AUTO: 2.44 MILL/UL (ref 4.7–6.1)
SODIUM SERPL-SCNC: 143 MMOL/L (ref 136–145)
SPECIMEN DRAWN FROM PATIENT: (no result)
WBC # BLD AUTO: 7.5 THOU/UL (ref 4.8–10.8)

## 2019-01-21 PROCEDURE — 0DH63UZ INSERTION OF FEEDING DEVICE INTO STOMACH, PERCUTANEOUS APPROACH: ICD-10-PCS | Performed by: SURGERY

## 2019-01-21 PROCEDURE — 0B113F4 BYPASS TRACHEA TO CUTANEOUS WITH TRACHEOSTOMY DEVICE, PERCUTANEOUS APPROACH: ICD-10-PCS | Performed by: SURGERY

## 2019-01-21 RX ADMIN — SILVER SULFADIAZINE SCH APPLIC: 10 CREAM TOPICAL at 11:13

## 2019-01-21 RX ADMIN — SILVER SULFADIAZINE SCH APPLIC: 10 CREAM TOPICAL at 22:03

## 2019-01-21 RX ADMIN — CEFTRIAXONE SCH MLS: 1 INJECTION, POWDER, FOR SOLUTION INTRAMUSCULAR; INTRAVENOUS at 15:52

## 2019-01-21 RX ADMIN — INSULIN GLARGINE SCH: 100 INJECTION, SOLUTION SUBCUTANEOUS at 11:12

## 2019-01-21 RX ADMIN — FENTANYL CITRATE SCH MLS: 50 INJECTION, SOLUTION INTRAMUSCULAR; INTRAVENOUS at 07:02

## 2019-01-21 RX ADMIN — Medication SCH ML: at 10:30

## 2019-01-21 RX ADMIN — Medication SCH ML: at 22:03

## 2019-01-21 NOTE — PRG
DATE OF SERVICE:  01/21/2019



SUBJECTIVE:  Mr. Bojorquez has had a tracheostomy and gastrostomy.  He is still

encephalopathic.  According to the family, he will be briefly establish eye contact,

but not for a long period of time.  Does not communicate.  Does not follow commands. 



OBJECTIVE:  VITAL SIGNS:  His temperature max 100.2,

                                                                   otherwise

afebrile.  BP and pulse O2 saturations are good. 

HEENT:  His ocular movements are conjugate.  He does not establish eye contact.

Oral cavity is moist. 

LUNGS:  Symmetric, coarse breath sounds. 

HEART:  S1 and S2.  Regular rate. 

ABDOMEN:  Soft, not distended or tender. 

EXTREMITIES:  Left leg with those areas of ulceration in the left anterior leg. 

SKIN:  Warm and well vascularized.



LABORATORY DATA:  White cell count 7.5, hemoglobin 7.6, and platelets 234.  Sodium

143 and creatinine 2.15.  I do not think he has been dialyzed any longer. 



ASSESSMENT AND DISCUSSION:  Severe Streptococcus pyogenes infection with bacteremia

and severe cellulitis, but the extremity has improved markedly.  At this point, I do

not see any risk of amputation.  The main issue now is the encephalopathy and renal

insufficiency and hopefully those things will turn around in the next few days.

Should be able to stop Rocephin in next few days as well. 







Job ID:  [id number]

## 2019-01-21 NOTE — OP
DATE OF PROCEDURE:  01/21/2019



PREOPERATIVE DIAGNOSES:  

1. Acute respiratory failure.

2. Resolving group A streptococcus bacteremia with left lower extremity bullous

cellulitis. 



POSTOPERATIVE DIAGNOSES:  

1. Acute respiratory failure.

2. Resolving group A streptococcus bacteremia with left lower extremity bullous

cellulitis. 



PROCEDURES PERFORMED:  

1. Percutaneous tracheostomy tube placement.

2. Percutaneous endoscopic gastrostomy tube placement.

3.



ANESTHESIA:  Deep sedation and local.



INDICATIONS FOR PROCEDURE:  A 68-year-old man was admitted on 01/07/2019 with acute

group A Streptococcus cellulitis with septicemia complicated by multiple organ

failure syndrome. 



The patient has been on full mechanical ventilator support all this while. 



I was asked to evaluate the patient for placement of a percutaneous tracheostomy

tube in anticipation of potential prolonged mechanical ventilator support and to

facilitate ventilator wean. 



The gastrostomy tube was also warranted at this time to provide the patient with

prolonged enteral nutritional supplementation. 



DESCRIPTION OF PROCEDURE:  Informed consent obtained from the patient's wife.  The

patient was placed in supine position.  He is on full mechanical ventilatory

support.  FiO2 is 100%.  The patient was then given aliquots of midazolam and

fentanyl to achieve comfort followed by vecuronium 10 mg intravenously. 



Fiberoptic bronchoscope was introduced through the previous endotracheal tube and

advanced to visualize the salma. 



The tip of the endotracheal tube was withdrawn to approximately 6 cm above the

salma, transilluminating the anterior neck, the area chosen for the placement of

the tracheostomy tube. 



At this juncture, the anterior neck was sterilely prepped and draped in usual

fashion.  The skin two fingerbreadths above the suprasternal notch was anesthetized

with 1% lidocaine with epinephrine.  One cm vertical incision was made here using a

15 scalpel.  Introducer needle was inserted through this incision and advanced

through the anterior tracheal wall.  Through this, a guidewire is advanced into the

distal tracheal lumen without resistance.  The needle was withdrawn over the

guidewire.  Proper placement of the guidewire was confirmed by bronchoscopy. 

The anterior tracheal wall was then sterilely dilated over the guidewire.  Finally,

a size #8 tracheostomy tube with a dilator and introducer catheter were advanced as

a unit over the guidewire and placed in the distal tracheal lumen without

resistance.  The dilator, guidewire, and introducer catheter were removed as a unit,

leaving the tracheostomy tube in place.  Inner cannula was then inserted and the

patient was connected to mechanical ventilator support, we had a newly placed

tracheostomy tube.  Once the cuff was inflated good tidal volume is returned. 



Tracheostomy tube was secured to anterior neck using 0-silk suture at two points.

Trach dressings and tie were applied. 



The bronchoscope was withdrawn with the previous endotracheal tube as a unit

visualizing the tracheostomy site from above with good hemostasis.  Once the

endotracheal tube was removed, the bronchoscope was reintroduced into the newly

placed tracheostomy tube and advanced to visualize the salma. 

No active bleeding was noted from below.  The bronchoscope was then withdrawn

visualizing intact tracheobronchial mucosa.  The patient tolerated the operation

without any apparent complication.  He remains hemodynamically stable following

completion of procedure. 

Oxygen saturation remained 100% at all times. 



At this juncture, attention was then directed to the abdomen for the placement of

the gastrostomy tube. 

A mouth guard was put in place, through this an endoscope was advanced over the

tongue and gently intubating the esophagus.  The esophagus was visualized and is

intact. 



The endoscope was advanced into the gastric lumen, which was then insufflated.  The

scope was advanced into the proximal duodenum under direct vision.  No peptic

ulcerative disease was noted.  The scope was withdrawn into the stomach and the left

upper quadrant was transilluminated in the area chosen for placement of the

gastrostomy tube. 



The skin here was anesthetized with 1% lidocaine.  A stab incision was made using #1

scalpel.  Introducer needle was inserted through this incision, advanced into the

gastric lumen.  Through this, a guidewire was advanced into the gastric lumen and

captured with an Endo-Snare, which was introduced through the endoscope.  The

guidewire was connected to a 20-[place] citizen gastrostomy tube. 



The distal end of the guidewire was then pulled out through the insertion site on

the abdomen leaving the mushroom end of the gastrostomy tube abutting the gastric

lumen.  This was visualized by endoscopy, sitting in the right position with no

active bleeding present. 



The gastrostomy tube was then fashioned to length and secured to the anterior

abdominal wall at 3 cm using a bolster.  The mushroom end of the gastrostomy tube

again was well visualized abutting the gastric wall within the lumen.  No active

bleeding noted here.  The abdomen was desufflated.  The endoscope was withdrawn

visualizing intact esophageal mucosa. 



The patient tolerated this operation without any apparent complication and remains

hemodynamically stable following completion of the procedure. 







Job ID:  [id number]

## 2019-01-21 NOTE — PRG
DATE OF SERVICE:  01/21/2019



SUBJECTIVE:  Mr. Bojorquez is a 68-year-old white male, who was admitted with sepsis

secondary to left leg cellulitis and developed acute kidney injury secondary to

acute tubular necrosis.  Currently, I am dialyzing the patient.  I am at the bedside

supervising his dialysis.  Fluid removal is being done only as tolerated. 



OBJECTIVE:  VITAL SIGNS:  Blood pressure is 117/59, heart rate 66, respiratory rate

is 12, and pulse ox 100%. 

GENERAL:  Noted to be comfortable, not in overt distress. 

SKIN:  Adequate turgor. 

HEENT:  He has slightly pale conjunctivae.  Anicteric sclerae. 

NECK:  No neck mass.  No carotid bruits.  No JVD. 

LUNGS:  Decreased breath sounds. 

HEART:  Normal sinus rhythm.  No murmur.  No gallops.  No rubs. 

ABDOMEN:  Globular, soft, nontender.  No masses. 

EXTREMITIES:  No edema.  Possible left leg cellulitis.



MEDICATIONS:  Medications of January 21, 2019 were reviewed.



LABORATORY DATA:  Laboratories of January 21, 2019; white count 7.5, hemoglobin 7.6,

hematocrit 23.8.  Sodium 143, potassium 4.1, chloride 109, carbon dioxide 24, BUN

49, creatinine 2.15, glucose 115, calcium 8.5. 



ASSESSMENT AND PLAN:  

1. Acute kidney injury secondary to acute tubular necrosis.  Continue supportive

care.  Creatinine noted slightly high today at 2.15.  Please note, he has been off

dialysis for the last 2 days.  My plan is to do a 4-hour hemodialysis, again fluid

removal as tolerated.  Avoiding heparin use for the moment. 

2. Anemia, p.r.n. blood transfusion.

3. Sepsis - the patient is currently on antibiotics.  Continue supportive care.

4. Mental status change - secondary to metabolic encephalopathy.  Supportive care.







Job ID:  [id number]

## 2019-01-21 NOTE — PDOC.PN
- Subjective


Encounter Start Date: 01/21/19


Encounter Start Time: 10:10


-: non-verbal





- Objective


Resuscitation Status - Order Detail:





01/07/19 04:33


Resuscitation Status Routine 


   Resuscitation Status: FULL: Full Resuscitation








Vital Signs & Weight: 


 Vital Signs (12 hours)











  Temp Pulse Resp BP Pulse Ox


 


 01/21/19 16:00  98 F   28 H  


 


 01/21/19 14:32   73   129/60 


 


 01/21/19 14:00    28 H  


 


 01/21/19 12:40   68   126/55 L 


 


 01/21/19 12:00    21 H  


 


 01/21/19 11:13   65   117/56 L 


 


 01/21/19 11:00  97.8 F    


 


 01/21/19 10:00    22 H  


 


 01/21/19 09:20      100


 


 01/21/19 08:00    24 H  


 


 01/21/19 07:29   74   141/64 H 


 


 01/21/19 07:00  97.9 F    


 


 01/21/19 06:00    20  








 Weight











Admit Weight                   249 lb


 


Weight                         249 lb 1.957 oz











 Most Recent Monitor Data











Heart Rate from ECG            67


 


NIBP                           117/52


 


NIBP BP-Mean                   73


 


Respiration from ECG           19


 


SpO2                           100














I&O: 


 











 01/20/19 01/21/19 01/22/19





 06:59 06:59 06:59


 


Intake Total 1861.2 1700 727


 


Output Total 1790 1910 235


 


Balance 71.2 -210 492











Result Diagrams: 


 01/21/19 04:29





 01/21/19 03:30


Additional Labs: 


 Accuchecks











  01/21/19 01/21/19 01/21/19





  16:08 12:55 07:57


 


POC Glucose  99  102  104














  01/21/19 01/20/19 01/20/19





  07:16 23:51 21:02


 


POC Glucose  108  105  112 H














  01/20/19





  17:39


 


POC Glucose  116 H














Phys Exam





- Physical Examination


Constitutional: NAD


Trached. Ventilated


Respiratory: no wheezing, no rales, no rhonchi, clear to auscultation bilateral


Cardiovascular: RRR, no significant murmur, no rub


Gastrointestinal: soft, no distention, positive bowel sounds


Moderate diffuse edema





Dx/Plan


(1) Cellulitis and abscess of left leg


Code(s): L03.116 - CELLULITIS OF LEFT LOWER LIMB; L02.416 - CUTANEOUS ABSCESS 

OF LEFT LOWER LIMB   Status: Acute   





(2) Group A streptococcal infection


Code(s): B95.0 - STREPTOCOCCUS, GROUP A, CAUSING DISEASES CLASSD ELSWHR   Status

: Acute   





(3) Toxic metabolic encephalopathy


Code(s): G92 - TOXIC ENCEPHALOPATHY   Status: Acute   





(4) Acute renal failure


Status: Acute   


Qualifiers: 


   Acute renal failure type: with acute tubular necrosis   Qualified Code(s): 

N17.0 - Acute kidney failure with tubular necrosis   





(5) CAD (coronary artery disease)


Code(s): I25.10 - ATHSCL HEART DISEASE OF NATIVE CORONARY ARTERY W/O ANG PCTRS 

  Status: Acute   


Qualifiers: 


   Coronary Disease-Associated Artery/Lesion type: native artery   Native vs. 

transplanted heart: native heart   Associated angina: without angina   

Qualified Code(s): I25.10 - Atherosclerotic heart disease of native coronary 

artery without angina pectoris   





(6) DKA (diabetic ketoacidoses)


Code(s): E13.10 - OTH DIABETES MELLITUS WITH KETOACIDOSIS WITHOUT COMA   Status

: Resolved   





(7) DM type 2 (diabetes mellitus, type 2)


Status: Acute   


Qualifiers: 


   Diabetes mellitus long term insulin use: with long term use   Diabetes 

mellitus complication status: with kidney complications 





(8) Diastolic CHF


Code(s): I50.30 - UNSPECIFIED DIASTOLIC (CONGESTIVE) HEART FAILURE   Status: 

Acute   





(9) HTN (hypertension)


Code(s): I10 - ESSENTIAL (PRIMARY) HYPERTENSION   Status: Acute   


Qualifiers: 


   Hypertension type: essential hypertension   Qualified Code(s): I10 - 

Essential (primary) hypertension   





- Plan





* Trach and PEG placed.


* Continue to wean sedation and allow him to awaken as much as possible.


* Continue antibiotics, but infection appears to be largely resolved. ID 

following.


* HD today.

## 2019-01-21 NOTE — PRG
DATE OF SERVICE:  01/21/2019



SUBJECTIVE:  Mr. Bojorquez's heart rate in the 60s, blood pressure 126/55, respiratory

rates in the teens.  I think he makes a little more eye contact today, but it is

transient.  He will not reliably follow commands. 



OBJECTIVE:  LUNGS:  Remarkable for coarse equal breath sounds. 

HEART:  Regular rhythm. 

ABDOMEN:  Soft. 

EXTREMITIES:  Without asymmetry.



LABORATORY DATA:  White count 7.5, hemoglobin 7.6, platelets 234.  Sodium 143,

potassium 4.1, chloride 109, bicarb 24, BUN 49, creatinine 2.15.  He has been

dialyzed today. 



IMPRESSION:  

1. Status post incision and drainage of strep pyogenes infection.

2. Respiratory failure. 

We will continue to slowly wean him. 



He was fully anticoagulated with his renal failure and I would feel more comfortable

given the bleeding risk with him be on prophylactic dose Lovenox for now. 



He is still on amiodarone. 



He is still on antimicrobial therapy intravenously per Dr. Mazariegos.  Hopefully, we can

gradually wean him to daytime trach collar as week progresses. 







Job ID:  [id number]

## 2019-01-22 LAB
ANALYZER IN CARDIO: (no result)
ANION GAP SERPL CALC-SCNC: 12 MMOL/L (ref 10–20)
BASE EXCESS STD BLDA CALC-SCNC: 1.9 MEQ/L
BASOPHILS # BLD AUTO: 0.1 THOU/UL (ref 0–0.2)
BASOPHILS NFR BLD AUTO: 1 % (ref 0–1)
BUN SERPL-MCNC: 31 MG/DL (ref 8.4–25.7)
CA-I BLDA-SCNC: 1.17 MMOL/L (ref 1.12–1.3)
CALCIUM SERPL-MCNC: 8.2 MG/DL (ref 7.8–10.44)
CHLORIDE SERPL-SCNC: 106 MMOL/L (ref 98–107)
CO2 SERPL-SCNC: 26 MMOL/L (ref 23–31)
CREAT CL PREDICTED SERPL C-G-VRATE: 69 ML/MIN (ref 70–130)
EOSINOPHIL # BLD AUTO: 0.1 THOU/UL (ref 0–0.7)
EOSINOPHIL NFR BLD AUTO: 1.2 % (ref 0–10)
GLUCOSE SERPL-MCNC: 106 MG/DL (ref 80–115)
HCO3 BLDA-SCNC: 24.4 MEQ/L (ref 22–28)
HCT VFR BLDA CALC: 26 % (ref 42–52)
HGB BLD-MCNC: 7.1 G/DL (ref 14–18)
HGB BLDA-MCNC: 8.9 G/DL (ref 14–18)
LYMPHOCYTES # BLD: 0.6 THOU/UL (ref 1.2–3.4)
LYMPHOCYTES NFR BLD AUTO: 11.7 % (ref 21–51)
MCH RBC QN AUTO: 29.9 PG (ref 27–31)
MCV RBC AUTO: 97.4 FL (ref 78–98)
MONOCYTES # BLD AUTO: 0.3 THOU/UL (ref 0.11–0.59)
MONOCYTES NFR BLD AUTO: 5.3 % (ref 0–10)
NEUTROPHILS # BLD AUTO: 4.3 THOU/UL (ref 1.4–6.5)
NEUTROPHILS NFR BLD AUTO: 80.8 % (ref 42–75)
O2 A-A PPRESDIFF RESPIRATORY: 122.28 MM[HG] (ref 0–20)
PCO2 BLDA: 30.1 MMHG (ref 35–45)
PH BLDA: 7.53 [PH] (ref 7.35–7.45)
PLATELET # BLD AUTO: 239 THOU/UL (ref 130–400)
PO2 BLDA: 125.3 MMHG (ref 80–?)
POTASSIUM BLD-SCNC: 3.83 MMOL/L (ref 3.7–5.3)
POTASSIUM SERPL-SCNC: 3.8 MMOL/L (ref 3.5–5.1)
RBC # BLD AUTO: 2.36 MILL/UL (ref 4.7–6.1)
SODIUM SERPL-SCNC: 140 MMOL/L (ref 136–145)
SPECIMEN DRAWN FROM PATIENT: (no result)
WBC # BLD AUTO: 5.4 THOU/UL (ref 4.8–10.8)

## 2019-01-22 RX ADMIN — Medication SCH ML: at 09:30

## 2019-01-22 RX ADMIN — CEFTRIAXONE SCH MLS: 1 INJECTION, POWDER, FOR SOLUTION INTRAMUSCULAR; INTRAVENOUS at 15:33

## 2019-01-22 RX ADMIN — SILVER SULFADIAZINE SCH APPLIC: 10 CREAM TOPICAL at 21:00

## 2019-01-22 RX ADMIN — Medication SCH ML: at 20:54

## 2019-01-22 RX ADMIN — SILVER SULFADIAZINE SCH APPLIC: 10 CREAM TOPICAL at 09:26

## 2019-01-22 NOTE — PDOC.PN
- Subjective


Encounter Start Date: 01/22/19


Encounter Start Time: 10:00





Non-verbal, but apparently was waking better and recognized family.





- Objective


Resuscitation Status - Order Detail:





01/07/19 04:33


Resuscitation Status Routine 


   Resuscitation Status: FULL: Full Resuscitation








Vital Signs & Weight: 


 Vital Signs (12 hours)











  Temp Pulse Pulse Pulse Pulse Resp BP


 


 01/22/19 22:15   81      119/64


 


 01/22/19 22:00       24 H 


 


 01/22/19 20:00       22 H 


 


 01/22/19 19:00  99.2 F      


 


 01/22/19 18:33   73      127/59 L


 


 01/22/19 18:00       33 H 


 


 01/22/19 16:00       28 H 


 


 01/22/19 15:13   76      122/56 L


 


 01/22/19 15:00  99 F     79  34 H 


 


 01/22/19 14:32    77  81   


 


 01/22/19 14:01  99.4 F     81  22 H 


 


 01/22/19 14:00       34 H 


 


 01/22/19 13:41  99.6 F     78  33 H 


 


 01/22/19 13:25   78      129/63


 


 01/22/19 12:00       34 H 














  BP BP BP Pulse Ox Pulse Ox Pulse Ox


 


 01/22/19 22:15      


 


 01/22/19 22:00      


 


 01/22/19 20:00      


 


 01/22/19 19:00      


 


 01/22/19 18:33      


 


 01/22/19 18:00      


 


 01/22/19 16:00      


 


 01/22/19 15:13      


 


 01/22/19 15:00    137/61  99  


 


 01/22/19 14:32  124/54 L  167/70 H    98  99


 


 01/22/19 14:01    129/55 L  99  


 


 01/22/19 14:00      


 


 01/22/19 13:41    133/54 L  98  


 


 01/22/19 13:25      


 


 01/22/19 12:00      








 Weight











Admit Weight                   249 lb


 


Weight                         249 lb 1.957 oz











 Most Recent Monitor Data











Heart Rate from ECG            75


 


NIBP                           136/62


 


NIBP BP-Mean                   86


 


Respiration from ECG           33


 


SpO2                           99














I&O: 


 











 01/21/19 01/22/19 01/23/19





 06:59 06:59 06:59


 


Intake Total 1700 2797 2010


 


Output Total 5598 142 6040


 


Balance -210 2082 690











Result Diagrams: 


 01/22/19 04:28





 01/22/19 04:28


Additional Labs: 


 Accuchecks











  01/22/19 01/22/19





  08:07 04:21


 


POC Glucose  106  105














Phys Exam





- Physical Examination


Constitutional: NAD


Respiratory: no wheezing, no rales, no rhonchi


Cardiovascular: RRR, no significant murmur, no rub


Gastrointestinal: soft, non-tender, no distention, positive bowel sounds


Diffusely edematous.





Dx/Plan


(1) Cellulitis and abscess of left leg


Code(s): L03.116 - CELLULITIS OF LEFT LOWER LIMB; L02.416 - CUTANEOUS ABSCESS 

OF LEFT LOWER LIMB   Status: Resolved   Comment: Status post debridement of 

infection.  Active infection now resolved.   





(2) Group A streptococcal infection


Code(s): B95.0 - STREPTOCOCCUS, GROUP A, CAUSING DISEASES CLASSD ELSWHR   Status

: Resolved   





(3) Toxic metabolic encephalopathy


Code(s): G92 - TOXIC ENCEPHALOPATHY   Status: Acute   





(4) Acute renal failure


Status: Acute   


Qualifiers: 


   Acute renal failure type: with acute tubular necrosis   Qualified Code(s): 

N17.0 - Acute kidney failure with tubular necrosis   


Comment: On hemodialysis   





(5) CAD (coronary artery disease)


Code(s): I25.10 - ATHSCL HEART DISEASE OF NATIVE CORONARY ARTERY W/O ANG PCTRS 

  Status: Acute   


Qualifiers: 


   Coronary Disease-Associated Artery/Lesion type: native artery   Native vs. 

transplanted heart: native heart   Associated angina: without angina   

Qualified Code(s): I25.10 - Atherosclerotic heart disease of native coronary 

artery without angina pectoris   





(6) DKA (diabetic ketoacidoses)


Code(s): E13.10 - OTH DIABETES MELLITUS WITH KETOACIDOSIS WITHOUT COMA   Status

: Resolved   





(7) DM type 2 (diabetes mellitus, type 2)


Status: Acute   


Qualifiers: 


   Diabetes mellitus long term insulin use: with long term use   Diabetes 

mellitus complication status: with kidney complications 





(8) Diastolic CHF


Code(s): I50.30 - UNSPECIFIED DIASTOLIC (CONGESTIVE) HEART FAILURE   Status: 

Acute   





(9) HTN (hypertension)


Code(s): I10 - ESSENTIAL (PRIMARY) HYPERTENSION   Status: Acute   


Qualifiers: 


   Hypertension type: essential hypertension   Qualified Code(s): I10 - 

Essential (primary) hypertension   





- Plan





* Necrotizing cellulitis left leg.  S/P debridement.  Infection resolved.


* Sepsis related renal failure.  On hemodialysis.  Still some fluid overload.


* Respiratory failure.  Had encephalopathy and was not weanable.  Trach and PEG 

placed.


* Sedation weaned and he is showing signs of improvement with the 

encephalopathy.

## 2019-01-22 NOTE — PRG
DATE OF SERVICE:  01/22/2019



The patient was seen this morning on rounds with Dr. Oglesby and trauma team.  His PEG

and trach site were both evaluated.  No signs of bleeding or infection at this time.

 PEG tube can be used today for medications and nutrition.  Trach sutures will need

to be removed in 1 week.  Trauma Team will now sign off from the patient.  Please

call with any questions or concerns.  The patient was seen and discussed with Dr. Oglesby, on morning rounds. 







Job ID:  [id number]

## 2019-01-22 NOTE — PRG
DATE OF SERVICE:  01/22/2019



SUBJECTIVE:  Mr. Bojorquez's encephalopathy appears to be slowly improving.



OBJECTIVE:  VITAL SIGNS:  He is afebrile, respiratory rate is 22, oximetry is 99,

blood pressure 129/55.  Intake and output coming into today was positive 2082. 

LUNGS:  Clear anteriorly. 

HEART:  Regular rhythm. 

ABDOMEN:  Soft. 

EXTREMITIES:  His left lower extremity remains bandaged.



LABORATORY DATA:  White count 5.4, hemoglobin 7.1, platelets 239,000.  He will

receive a unit of blood today.  Electrolytes are normal.  BUN 31, creatinine 1.67. 



IMPRESSION:  

1. Strep pyogenes soft tissue infection, status post incision and debridement, on

antimicrobial therapy. 

2. Respiratory failure, now with a trach and PEG, made a significant move to

decrease ventilatory support today. 

He will continue on Precedex. 



Hopefully, we will have him to a trach collar maybe by tomorrow. 



Critical care time 30 minutes.







Job ID:  [id number]

## 2019-01-22 NOTE — PRG
DATE OF SERVICE:  01/22/2019



RENAL MEDICINE



SUBJECTIVE:  Mr. Bojorquez is a 68-year-old white male, who was seen by the Renal

Service for his acute kidney injury secondary to acute tubular necrosis.  He was

admitted for sepsis.  In the interim, he has undergone a tracheostomy and a PEG tube

placement.  This morning, the patient is stable.  In addition, 1 unit of packed RBC

will be given. 



OBJECTIVE:  VITAL SIGNS:  Blood pressure is 125/61, heart rate 69, respiratory rate

24, and pulse ox 99%. 

GENERAL:  Arousable, comfortable, not in distress. 

HEENT:  Pale conjunctivae.  Anicteric sclerae.  No neck mass.  No carotid bruits.

No JVD.  Positive for tracheostomy. 

LUNGS:  Clear breath sounds.  No wheezing.  No crackles. 

HEART:  Normal sinus rhythm.  No murmur.  No gallops.  No rubs. 

ABDOMEN:  Globular, soft, nontender.  No masses.  Positive for PEG tube. 

EXTREMITIES:  Trace edema.



MEDICATIONS:  Medications of January 22, 2019, was reviewed.



LABORATORY DATA:  Laboratories of January 22, 2019; white count 5.4, hemoglobin 7.1.

 Sodium 140, potassium 3.8, chloride 106, carbon dioxide 26, BUN 31, creatinine

1.67, glucose 106, and calcium 8.2. 



ASSESSMENT AND PLAN:  

1. Acute kidney injury secondary to ischemic acute tubular necrosis.  We will

continue to observe for possible renal recovery.  No indication for any dialytic

intervention 

this morning.  Continue supportive care.

2. Sepsis-on IV antibiotics.  This secondary to the left leg cellulitis.

3. Acute respiratory failure-the patient currently now extubated and has a

tracheostomy.  Continue supportive care. 

4. Anemia.  Agree with planned 1 unit packed RBC today.





Job ID:  [id number]

## 2019-01-23 LAB
ANALYZER IN CARDIO: (no result)
ANION GAP SERPL CALC-SCNC: 14 MMOL/L (ref 10–20)
BASE EXCESS STD BLDA CALC-SCNC: -0.1 MEQ/L
BASOPHILS # BLD AUTO: 0 THOU/UL (ref 0–0.2)
BASOPHILS NFR BLD AUTO: 0.3 % (ref 0–1)
BUN SERPL-MCNC: 35 MG/DL (ref 8.4–25.7)
CA-I BLDA-SCNC: 1.2 MMOL/L (ref 1.12–1.3)
CALCIUM SERPL-MCNC: 8.3 MG/DL (ref 7.8–10.44)
CHLORIDE SERPL-SCNC: 108 MMOL/L (ref 98–107)
CO2 SERPL-SCNC: 22 MMOL/L (ref 23–31)
CREAT CL PREDICTED SERPL C-G-VRATE: 63 ML/MIN (ref 70–130)
EOSINOPHIL # BLD AUTO: 0 THOU/UL (ref 0–0.7)
EOSINOPHIL NFR BLD AUTO: 0.8 % (ref 0–10)
GLUCOSE SERPL-MCNC: 192 MG/DL (ref 80–115)
HCO3 BLDA-SCNC: 22.6 MEQ/L (ref 22–28)
HCT VFR BLDA CALC: 28 % (ref 42–52)
HGB BLD-MCNC: 7.9 G/DL (ref 14–18)
HGB BLDA-MCNC: 9.4 G/DL (ref 14–18)
LYMPHOCYTES # BLD: 0.8 THOU/UL (ref 1.2–3.4)
LYMPHOCYTES NFR BLD AUTO: 12.2 % (ref 21–51)
MCH RBC QN AUTO: 30.9 PG (ref 27–31)
MCV RBC AUTO: 94.5 FL (ref 78–98)
MONOCYTES # BLD AUTO: 0.5 THOU/UL (ref 0.11–0.59)
MONOCYTES NFR BLD AUTO: 7.9 % (ref 0–10)
NEUTROPHILS # BLD AUTO: 4.9 THOU/UL (ref 1.4–6.5)
NEUTROPHILS NFR BLD AUTO: 78.8 % (ref 42–75)
O2 A-A PPRESDIFF RESPIRATORY: 139.72 MM[HG] (ref 0–20)
PCO2 BLDA: 29.9 MMHG (ref 35–45)
PH BLDA: 7.5 [PH] (ref 7.35–7.45)
PLATELET # BLD AUTO: 246 THOU/UL (ref 130–400)
PO2 BLDA: 108.1 MMHG (ref 80–?)
POTASSIUM BLD-SCNC: 3.41 MMOL/L (ref 3.7–5.3)
POTASSIUM SERPL-SCNC: 3.5 MMOL/L (ref 3.5–5.1)
RBC # BLD AUTO: 2.54 MILL/UL (ref 4.7–6.1)
SODIUM SERPL-SCNC: 140 MMOL/L (ref 136–145)
SPECIMEN DRAWN FROM PATIENT: (no result)
TROPONIN I SERPL DL<=0.01 NG/ML-MCNC: 0.22 NG/ML (ref ?–0.03)
TROPONIN I SERPL DL<=0.01 NG/ML-MCNC: 0.25 NG/ML (ref ?–0.03)
WBC # BLD AUTO: 6.2 THOU/UL (ref 4.8–10.8)

## 2019-01-23 RX ADMIN — NITROGLYCERIN SCH INCH: 20 OINTMENT TOPICAL at 16:58

## 2019-01-23 RX ADMIN — Medication SCH ML: at 09:53

## 2019-01-23 RX ADMIN — Medication SCH ML: at 20:28

## 2019-01-23 RX ADMIN — Medication SCH ML: at 09:54

## 2019-01-23 RX ADMIN — CEFTRIAXONE SCH MLS: 1 INJECTION, POWDER, FOR SOLUTION INTRAMUSCULAR; INTRAVENOUS at 16:55

## 2019-01-23 RX ADMIN — NITROGLYCERIN SCH INCH: 20 OINTMENT TOPICAL at 21:00

## 2019-01-23 RX ADMIN — SILVER SULFADIAZINE SCH APPLIC: 10 CREAM TOPICAL at 20:28

## 2019-01-23 RX ADMIN — Medication SCH ML: at 09:52

## 2019-01-23 RX ADMIN — INSULIN LISPRO PRN UNIT: 100 INJECTION, SOLUTION INTRAVENOUS; SUBCUTANEOUS at 23:59

## 2019-01-23 RX ADMIN — INSULIN LISPRO PRN UNIT: 100 INJECTION, SOLUTION INTRAVENOUS; SUBCUTANEOUS at 17:10

## 2019-01-23 RX ADMIN — SILVER SULFADIAZINE SCH APPLIC: 10 CREAM TOPICAL at 09:53

## 2019-01-23 NOTE — PDOC.PN
- Subjective


Encounter Start Date: 01/23/19


Encounter Start Time: 07:45


Subjective: awake, not fully oriented despite nodding 


-: wife and daughter at bedside





- Objective


Resuscitation Status - Order Detail:





01/07/19 04:33


Resuscitation Status Routine 


   Resuscitation Status: FULL: Full Resuscitation








MAR Reviewed: Yes


Vital Signs & Weight: 


 Vital Signs (12 hours)











  Temp Pulse Resp BP Pulse Ox


 


 01/23/19 11:51   83  18   100


 


 01/23/19 10:23   85   137/70 


 


 01/23/19 09:51   88   


 


 01/23/19 07:07   88   133/75 


 


 01/23/19 07:06   87  38 H   100


 


 01/23/19 06:00    25 H  


 


 01/23/19 04:00  99.2 F   18  


 


 01/23/19 02:25   85   130/57 L 


 


 01/23/19 02:00    27 H  








 Weight











Admit Weight                   249 lb


 


Weight                         249 lb 1.957 oz











 Most Recent Monitor Data











Heart Rate from ECG            102


 


NIBP                           133/75


 


NIBP BP-Mean                   94


 


Respiration from ECG           37


 


SpO2                           99














I&O: 


 











 01/22/19 01/23/19 01/24/19





 06:59 06:59 06:59


 


Intake Total 2797 3094 


 


Output Total 715 2155 125


 


Balance 2082 939 -125











Result Diagrams: 


 01/23/19 04:30





 01/23/19 04:30





Phys Exam





- Physical Examination


HEENT: PERRLA, sclera anicteric


Neck: no JVD


trach+


Respiratory: no wheezing, no rales


Cardiovascular: RRR, no significant murmur


Gastrointestinal: soft, positive bowel sounds


peg+


Musculoskeletal: pulses present, edema present


Neurological: non-focal, moves all 4 limbs





Dx/Plan


(1) Acute respiratory failure with hypoxia


Code(s): J96.01 - ACUTE RESPIRATORY FAILURE WITH HYPOXIA   Status: Acute   

Comment: s/p trach   





(2) Toxic metabolic encephalopathy


Code(s): G92 - TOXIC ENCEPHALOPATHY   Status: Acute   





(3) Sepsis


Code(s): A41.9 - SEPSIS, UNSPECIFIED ORGANISM   Status: Acute   


Qualifiers: 


   Sepsis type: Streptococcus group B   Qualified Code(s): A40.1 - Sepsis due 

to streptococcus, group B   





(4) Physical deconditioning


Code(s): R53.81 - OTHER MALAISE   Status: Acute   





(5) Acute renal failure


Status: Acute   


Qualifiers: 


   Acute renal failure type: with acute tubular necrosis   Qualified Code(s): 

N17.0 - Acute kidney failure with tubular necrosis   


Comment: On hemodialysis   





(6) CAD (coronary artery disease)


Code(s): I25.10 - ATHSCL HEART DISEASE OF NATIVE CORONARY ARTERY W/O ANG PCTRS 

  Status: Chronic   


Qualifiers: 


   Coronary Disease-Associated Artery/Lesion type: native artery   Native vs. 

transplanted heart: native heart   Associated angina: without angina   

Qualified Code(s): I25.10 - Atherosclerotic heart disease of native coronary 

artery without angina pectoris   





(7) CHF (congestive heart failure)


Code(s): I50.9 - HEART FAILURE, UNSPECIFIED   Status: Acute   


Qualifiers: 


   Heart failure type: combined systolic and diastolic 


Comment: ef of 45%   





(8) DM type 2 (diabetes mellitus, type 2)


Status: Chronic   


Qualifiers: 


   Diabetes mellitus long term insulin use: with long term use   Diabetes 

mellitus complication status: with kidney complications   Diabetes mellitus 

complication detail: with chronic kidney disease   Chronic kidney disease stage

: on chronic dialysis   Qualified Code(s): E11.22 - Type 2 diabetes mellitus 

with diabetic chronic kidney disease; N18.6 - End stage renal disease; Z79.4 - 

Long term (current) use of insulin; Z99.2 - Dependence on renal dialysis   





(9) HTN (hypertension)


Code(s): I10 - ESSENTIAL (PRIMARY) HYPERTENSION   Status: Chronic   


Qualifiers: 


   Hypertension type: essential hypertension   Qualified Code(s): I10 - 

Essential (primary) hypertension   





(10) Cellulitis and abscess of left leg


Code(s): L03.116 - CELLULITIS OF LEFT LOWER LIMB; L02.416 - CUTANEOUS ABSCESS 

OF LEFT LOWER LIMB   Status: Acute   Comment: resolving.   





(11) Group A streptococcal infection


Code(s): B95.0 - STREPTOCOCCUS, GROUP A, CAUSING DISEASES CLASSD ELSWHR   Status

: Acute   





- Plan


is on ceftriaxone


-: peg feeding


-: weaning per pulm adv


-: is on amiodarone, coreg, risperdal





* .








Review of Systems





- Medications/Allergies


Allergies/Adverse Reactions: 


 Allergies











Allergy/AdvReac Type Severity Reaction Status Date / Time


 


morphine Allergy  Anaphylaxis Verified 01/07/19 03:57











Medications: 


 Current Medications





Acetaminophen (Tylenol)  1,000 mg PO Q6H PRN


   PRN Reason: Mild Pain (1-3)


   Last Admin: 01/21/19 22:02 Dose:  1,000 mg


Albuterol/Ipratropium (Duoneb)  3 ml NEB [id number] [facility]


   Last Admin: 01/23/19 11:51 Dose:  3 ml


Amiodarone HCl (Cordarone)  400 mg PER TUBE BID [facility]


   Last Admin: 01/23/19 09:51 Dose:  400 mg


Aspirin (Aspirin)  325 mg PER TUBE DAILY [facility]


   Last Admin: 01/23/19 10:55 Dose:  325 mg


Bisacodyl (Dulcolax)  10 mg IA DAILY PRN


   PRN Reason: Constipation


   Last Admin: 01/13/19 09:44 Dose:  10 mg


Carvedilol (Coreg)  3.125 mg PER TUBE BID-[facility]


   Last Admin: 01/23/19 09:51 Dose:  3.125 mg


Dextrose/Water (Dextrose 50%)  25 gm IVP PRN PRN


   PRN Reason: HYPOGLYCEMIA PROTOCOL


Enoxaparin Sodium (Lovenox)  30 mg SC 0900 [facility]


   Last Admin: 01/23/19 09:50 Dose:  30 mg


Glucagon (Glucagon)  1 mg IM PRN PRN


   PRN Reason: HYPOGLYCEMIA PROTOCOL


Norepinephrine Bitartrate (Levophed)  250 mls @ 0 mls/hr IVPB INF [facility]; Protocol


   Last Admin: 01/10/19 14:43 Dose:  250 mls


Fentanyl Citrate 2,000 mcg/ (Sodium Chloride)  100 mls @ 0 mls/hr IV INF [facility]; 

Protocol


   Stop: 02/07/19 14:44


   Last Admin: 01/21/19 07:02 Dose:  100 mls


Fentanyl Citrate (Fentanyl Bolus)  250 mls @ 0 mls/hr IVPB PRN PRN


   PRN Reason: Breakthrough pain/agitation


   Stop: 02/07/19 14:44


Vasopressin 40 unit/Miscellaneous Medication 1 each/ Sodium Chloride  102 mls @ 

0 mls/hr IV INF PRN; Protocol


   PRN Reason: SBP>90


Dextrose/Water (D5w)  1,000 mls @ 0 mls/hr IV INF PRN


   PRN Reason: HYPOGLYCEMIA PROTOCOL


Sodium Chloride (Normal Saline 0.9%)  1,000 mls @ 50 mls/hr IV .Q20H [facility]


   Last Admin: 01/22/19 06:40 Dose:  1,000 mls


Ceftriaxone Sodium 1 gm/ (Sodium Chloride)  100 mls @ 100 mls/hr IVPB 1500 [facility]


   Last Admin: 01/22/19 15:33 Dose:  100 mls


Dexmedetomidine HCl 400 mcg/ (Sodium Chloride)  100 mls @ 0 mls/hr IVPB INF [facility]

; Protocol


   Last Admin: 01/23/19 10:34 Dose:  100 mls


Insulin Human Regular (Humulin R)  0 units SC .MODERATE SLIDING SC PRN; Protocol


   PRN Reason: MODERATE SLIDING SCALE


   Last Admin: 01/17/19 17:19 Dose:  2 units


Laxative/Stool Softener (Laxative Of Choice)  1 each PO DAILY PRN


   PRN Reason: Constipation


Lorazepam (Ativan)  2 mg SLOW IVP Q1H PRN


   PRN Reason: Breakthrough agitation


   Stop: 02/07/19 14:44


   Last Admin: 01/21/19 03:50 Dose:  2 mg


Magnesium Hydroxide (Milk Of Magnesium)  30 ml PO DAILY PRN


   PRN Reason: Constipation


   Last Admin: 01/12/19 08:32 Dose:  30 ml


Metoclopramide HCl (Reglan)  10 mg IVP Q6HR [facility]


   Last Admin: 01/23/19 05:22 Dose:  Not Given


Nitroglycerin (Nitro-Bid 2% Ointment)  1 inch TOP Q8HR [facility]


Discontinue Previous Narcotic Pain Medications And Benzodiazepines  1 each FS 

.ONE [facility]


   Stop: 02/07/19 14:44


Ondansetron HCl (Zofran Odt)  4 mg PO Q6H PRN


   PRN Reason: Nausea/Vomiting


Ondansetron HCl (Zofran)  4 mg IVP Q6H PRN


   PRN Reason: Nausea/Vomiting


Pantoprazole Sodium (Protonix)  40 mg IVP Q12HR [facility]


   Last Admin: 01/23/19 09:50 Dose:  40 mg


Risperidone (Risperidone)  0.5 mg PO BID [facility]


   Last Admin: 01/23/19 09:51 Dose:  0.5 mg


Silver Sulfadiazine (Silvadene)  0 gm TOP BID [facility]


   Last Admin: 01/23/19 09:53 Dose:  1 applic


Sodium Chloride (Flush - Normal Saline)  10 ml IVF Q12HR [facility]


   Last Admin: 01/23/19 09:54 Dose:  10 ml


Sodium Chloride (Flush - Normal Saline)  10 ml IVF PRN PRN


   PRN Reason: Saline Flush


   Last Admin: 01/23/19 09:52 Dose:  10 ml


Sodium Chloride (Flush - Normal Saline)  10 ml IV Q12HR [facility]


   Last Admin: 01/23/19 09:53 Dose:  10 ml

## 2019-01-23 NOTE — PRG
DATE OF SERVICE:  01/23/2019



SUBJECTIVE:  Mr. Bojorquez is a 68-year-old white male, who was admitted for septic

shock/left leg cellulitis and seen by the Renal Service for his acute kidney injury.

 He was initiated on dialysis due to volume overload.  He most likely has

superimposed ischemic ATN.  Cardiology has evaluated this patient, and a planned

cardiac cath is being entertained this coming Friday.  I have scheduled this patient

for 3-hour hemodialysis for fluid removal today.  Please note, his creatinine is

noted to be stable with most recent creatinine noted at 1.81. 



No other complaints today.  His confusion is much improved.  In the interim, he has

had a PEG tube and tracheostomy placed. 



OBJECTIVE:  VITAL SIGNS:  Blood pressure is 133/75, heart rate 87, respiratory rate

is 38?, pulse ox 100%. 

GENERAL:  Awake, comfortable, not in distress. 

SKIN:  Adequate turgor. 

HEENT:  Slightly pale conjunctivae.  Anicteric sclerae.  No neck mass.  No carotid

bruits.  No JVD.  Positive for tracheostomy. 

LUNGS:  Decreased breath sounds. 

HEART:  Normal sinus rhythm.  No murmurs, gallops, or rubs. 

ABDOMEN:  Globular, soft, nontender.  No masses. 

EXTREMITIES:  No edema.  No deformities.



MEDICATIONS:  Medications of January 23, 2019 were reviewed.



LABORATORY DATA:  Laboratories of January 23, 2019; sodium 140, potassium 3.5,

chloride 108, carbon dioxide 22, BUN 35, creatinine 1.81, glucose 192, calcium 8.3.

Hemoglobin 7.9. 



ASSESSMENT AND PLAN:  

1. Anemia, p.r.n. blood transfusion.

2. Acute kidney injury - stable.  We will continue current hemodialysis regimen.  I

will re-evaluate this patient in the next several days.  It is possible we may be

able to discontinue his dialysis due to the much improved creatinine and improved

volume status.  He is scheduled for shorter dialysis today for 3 hours with fluid

removal as tolerated. 

3. Acute respiratory failure, much improved.  The patient has had tracheostomy. 

Overall prognosis remains guarded.



ADDENDUM:  The patient continues to be on antibiotics.







Job ID:  [id number]

## 2019-01-23 NOTE — PRG
DATE OF SERVICE:  



SUBJECTIVE:  Danial Bojorquez was evaluated.  He is actually getting progressively

more alert.  He has been interacting with his wife in a positive fashion. 



OBJECTIVE:  VITAL SIGNS:  Unfortunately, his minute volume still 15 L/minute

QAMARKER] tidal volumes were approximately 400 mL with pressure support.  Heart 
rate

85, blood pressure 137/70, and respiratory rate 18. 

LUNGS:  Clear. 

HEART:  Regular rhythm. 

ABDOMEN:  Soft and nontender. 

EXTREMITIES:  His left extremity is bandaged.



LABORATORY DATA:  White count 6.3, hemoglobin 7.9, and platelets 246.  Sodium 
140,

potassium 3.5, chloride 108, bicarb 22, BUN 35, creatinine 1.81.  PH 7.5, CO2 
of 29,

PO2 of 108.  He is being dialyzed today. 



IMPRESSION:  

1. Respiratory failure, status post tracheostomy.

2. Probable critical illness, weakness.

3. Status post Strep pyogenes, cellulitis, an abscess without bacteremia,

status post incision and drainage. 

4. Obesity.

5. Status post tracheostomy and feeding tube placement.



PLAN:  Continue current ventilatory support.  We will check a chest x-ray in the

morning.  Continue lab checks.  Continue with attempts at weaning. 







Job ID:  [id number]



[facility]

## 2019-01-24 LAB
ANALYZER IN CARDIO: (no result)
ANION GAP SERPL CALC-SCNC: 13 MMOL/L (ref 10–20)
BASE EXCESS STD BLDA CALC-SCNC: 4.7 MEQ/L
BASOPHILS # BLD AUTO: 0.1 THOU/UL (ref 0–0.2)
BASOPHILS NFR BLD AUTO: 0.9 % (ref 0–1)
BUN SERPL-MCNC: 25 MG/DL (ref 8.4–25.7)
CA-I BLDA-SCNC: 1.17 MMOL/L (ref 1.12–1.3)
CALCIUM SERPL-MCNC: 8.1 MG/DL (ref 7.8–10.44)
CHLORIDE SERPL-SCNC: 106 MMOL/L (ref 98–107)
CO2 SERPL-SCNC: 24 MMOL/L (ref 23–31)
CREAT CL PREDICTED SERPL C-G-VRATE: 75 ML/MIN (ref 70–130)
EOSINOPHIL # BLD AUTO: 0.1 THOU/UL (ref 0–0.7)
EOSINOPHIL NFR BLD AUTO: 1.6 % (ref 0–10)
GLUCOSE SERPL-MCNC: 188 MG/DL (ref 80–115)
HCO3 BLDA-SCNC: 27.8 MEQ/L (ref 22–28)
HCT VFR BLDA CALC: 33 % (ref 42–52)
HGB BLD-MCNC: 7.6 G/DL (ref 14–18)
HGB BLDA-MCNC: 11.2 G/DL (ref 14–18)
LYMPHOCYTES # BLD: 0.9 THOU/UL (ref 1.2–3.4)
LYMPHOCYTES NFR BLD AUTO: 15.8 % (ref 21–51)
MCH RBC QN AUTO: 30.8 PG (ref 27–31)
MCV RBC AUTO: 94.2 FL (ref 78–98)
MONOCYTES # BLD AUTO: 0.4 THOU/UL (ref 0.11–0.59)
MONOCYTES NFR BLD AUTO: 6.3 % (ref 0–10)
NEUTROPHILS # BLD AUTO: 4.4 THOU/UL (ref 1.4–6.5)
NEUTROPHILS NFR BLD AUTO: 75.4 % (ref 42–75)
PCO2 BLDA: 35.8 MMHG (ref 35–45)
PH BLDA: 7.51 [PH] (ref 7.35–7.45)
PLATELET # BLD AUTO: 223 THOU/UL (ref 130–400)
PO2 BLDA: 110.5 MMHG (ref 80–?)
POTASSIUM BLD-SCNC: 3.51 MMOL/L (ref 3.7–5.3)
POTASSIUM SERPL-SCNC: 3.6 MMOL/L (ref 3.5–5.1)
RBC # BLD AUTO: 2.47 MILL/UL (ref 4.7–6.1)
SODIUM SERPL-SCNC: 139 MMOL/L (ref 136–145)
SPECIMEN DRAWN FROM PATIENT: (no result)
WBC # BLD AUTO: 5.9 THOU/UL (ref 4.8–10.8)

## 2019-01-24 RX ADMIN — Medication SCH ML: at 20:54

## 2019-01-24 RX ADMIN — SILVER SULFADIAZINE SCH APPLIC: 10 CREAM TOPICAL at 09:14

## 2019-01-24 RX ADMIN — INSULIN LISPRO PRN UNIT: 100 INJECTION, SOLUTION INTRAVENOUS; SUBCUTANEOUS at 05:03

## 2019-01-24 RX ADMIN — NITROGLYCERIN SCH INCH: 20 OINTMENT TOPICAL at 07:39

## 2019-01-24 RX ADMIN — Medication SCH ML: at 09:14

## 2019-01-24 RX ADMIN — NITROGLYCERIN SCH INCH: 20 OINTMENT TOPICAL at 14:59

## 2019-01-24 RX ADMIN — CEFTRIAXONE SCH MLS: 1 INJECTION, POWDER, FOR SOLUTION INTRAMUSCULAR; INTRAVENOUS at 14:59

## 2019-01-24 RX ADMIN — INSULIN LISPRO PRN UNIT: 100 INJECTION, SOLUTION INTRAVENOUS; SUBCUTANEOUS at 12:54

## 2019-01-24 RX ADMIN — MEROPENEM AND SODIUM CHLORIDE SCH MLS: 1 INJECTION, SOLUTION INTRAVENOUS at 17:57

## 2019-01-24 RX ADMIN — FLUCONAZOLE SCH MLS: 2 INJECTION, SOLUTION INTRAVENOUS at 17:57

## 2019-01-24 RX ADMIN — INSULIN LISPRO PRN UNIT: 100 INJECTION, SOLUTION INTRAVENOUS; SUBCUTANEOUS at 17:55

## 2019-01-24 RX ADMIN — SILVER SULFADIAZINE SCH APPLIC: 10 CREAM TOPICAL at 20:54

## 2019-01-24 RX ADMIN — NITROGLYCERIN SCH INCH: 20 OINTMENT TOPICAL at 20:55

## 2019-01-24 NOTE — RAD
CHEST 1 VIEW:

 

Date:  01/24/19

 

HISTORY:  

Dyspnea. Intubated. Follow-up. 

 

COMPARISON:  

01/19/19. 

 

FINDINGS:

Cardiac silhouette is magnified and enlarged. Pulmonary vasculature remains engorged with patchy bila
teral perihilar and bibasilar infiltrates. Mediastinum is midline. Tracheostomy appliance is now in p
lace. Endotracheal catheter and nasogastric tube are no longer visible. No evidence of pneumothorax. 


 

IMPRESSION: 

1.  Good radiographic position of tracheostomy appliance. 

2.  Mild pulmonary vascular congestion appears stable. 

 

 

POS: [facility]

## 2019-01-24 NOTE — PDOC.PN
- Subjective


Encounter Start Date: 01/24/19


Encounter Start Time: 07:45


Subjective: awake, on vent


-: wife at bedside





- Objective


Resuscitation Status - Order Detail:





01/07/19 04:33


Resuscitation Status Routine 


   Resuscitation Status: FULL: Full Resuscitation








MAR Reviewed: Yes


Vital Signs & Weight: 


 Vital Signs (12 hours)











  Pulse Pulse Pulse Resp BP BP BP


 


 01/24/19 10:46  79     134/52 L  


 


 01/24/19 08:47   83  90    133/56 L  150/60 H


 


 01/24/19 07:04  74     137/88  


 


 01/24/19 07:00  73    35 H   


 


 01/24/19 06:00     33 H   


 


 01/24/19 04:00     34 H   


 


 01/24/19 02:13  83     126/54 L  


 


 01/24/19 02:00     34 H   














  Pulse Ox Pulse Ox Pulse Ox


 


 01/24/19 10:46   


 


 01/24/19 08:47   100  100


 


 01/24/19 07:04   


 


 01/24/19 07:00  100  


 


 01/24/19 06:00   


 


 01/24/19 04:00   


 


 01/24/19 02:13   


 


 01/24/19 02:00   








 Weight











Admit Weight                   249 lb


 


Weight                         249 lb 1.957 oz











 Most Recent Monitor Data











Heart Rate from ECG            82


 


NIBP                           133/56


 


NIBP BP-Mean                   81


 


Respiration from ECG           35


 


SpO2                           100














I&O: 


 











 01/23/19 01/24/19 01/25/19





 06:59 06:59 06:59


 


Intake Total 3094 2887 


 


Output Total 2155 1098 100


 


Balance 939 1789 -100











Result Diagrams: 


 01/24/19 05:06





 01/24/19 05:06


Additional Labs: 


 Accuchecks











  01/24/19 01/23/19 01/23/19





  04:50 23:58 16:44


 


POC Glucose  185 H  190 H  169 H














Phys Exam





- Physical Examination


HEENT: PERRLA, moist MMs


Neck: no JVD


trach+


Respiratory: no wheezing, no rales


Cardiovascular: RRR, no significant murmur


Gastrointestinal: soft, non-tender, positive bowel sounds


peg+


Musculoskeletal: pulses present, edema present


Neurological: non-focal, moves all 4 limbs





Dx/Plan


(1) Acute respiratory failure with hypoxia


Code(s): J96.01 - ACUTE RESPIRATORY FAILURE WITH HYPOXIA   Status: Acute   

Comment: s/p trach   





(2) Toxic metabolic encephalopathy


Code(s): G92 - TOXIC ENCEPHALOPATHY   Status: Acute   





(3) Sepsis


Code(s): A41.9 - SEPSIS, UNSPECIFIED ORGANISM   Status: Acute   


Qualifiers: 


   Sepsis type: Streptococcus group B   Qualified Code(s): A40.1 - Sepsis due 

to streptococcus, group B   





(4) Physical deconditioning


Code(s): R53.81 - OTHER MALAISE   Status: Acute   





(5) Acute renal failure


Status: Acute   


Qualifiers: 


   Acute renal failure type: with acute tubular necrosis   Qualified Code(s): 

N17.0 - Acute kidney failure with tubular necrosis   


Comment: On hemodialysis   





(6) CAD (coronary artery disease)


Code(s): I25.10 - ATHSCL HEART DISEASE OF NATIVE CORONARY ARTERY W/O ANG PCTRS 

  Status: Chronic   


Qualifiers: 


   Coronary Disease-Associated Artery/Lesion type: native artery   Native vs. 

transplanted heart: native heart   Associated angina: without angina   

Qualified Code(s): I25.10 - Atherosclerotic heart disease of native coronary 

artery without angina pectoris   





(7) CHF (congestive heart failure)


Code(s): I50.9 - HEART FAILURE, UNSPECIFIED   Status: Acute   


Qualifiers: 


   Heart failure type: combined systolic and diastolic 


Comment: ef of 45%   





(8) DM type 2 (diabetes mellitus, type 2)


Status: Chronic   


Qualifiers: 


   Diabetes mellitus long term insulin use: with long term use   Diabetes 

mellitus complication status: with kidney complications   Diabetes mellitus 

complication detail: with chronic kidney disease   Chronic kidney disease stage

: on chronic dialysis   Qualified Code(s): E11.22 - Type 2 diabetes mellitus 

with diabetic chronic kidney disease; N18.6 - End stage renal disease; Z79.4 - 

Long term (current) use of insulin; Z99.2 - Dependence on renal dialysis   





(9) HTN (hypertension)


Code(s): I10 - ESSENTIAL (PRIMARY) HYPERTENSION   Status: Chronic   


Qualifiers: 


   Hypertension type: essential hypertension   Qualified Code(s): I10 - 

Essential (primary) hypertension   





(10) Cellulitis and abscess of left leg


Code(s): L03.116 - CELLULITIS OF LEFT LOWER LIMB; L02.416 - CUTANEOUS ABSCESS 

OF LEFT LOWER LIMB   Status: Acute   Comment: resolving.   





(11) Group A streptococcal infection


Code(s): B95.0 - STREPTOCOCCUS, GROUP A, CAUSING DISEASES CLASSD ELSWHR   Status

: Acute   





- Plan


is on ceftriaxone, had HD yesterday with removal of 4 lts


-: weaning per pulm adv


-: will likely need ltac


-: for cath in am, HD after that


-: continue amiodarone, coreg, risperdal





* .








Review of Systems





- Medications/Allergies


Allergies/Adverse Reactions: 


 Allergies











Allergy/AdvReac Type Severity Reaction Status Date / Time


 


morphine Allergy  Anaphylaxis Verified 01/07/19 03:57











Medications: 


 Current Medications





Acetaminophen (Tylenol)  1,000 mg PO Q6H PRN


   PRN Reason: Mild Pain (1-3)


   Last Admin: 01/24/19 09:37 Dose:  1,000 mg


Albuterol/Ipratropium (Duoneb)  3 ml NEB [id number] [facility]


   Last Admin: 01/24/19 07:00 Dose:  3 ml


Amiodarone HCl (Cordarone)  400 mg PER TUBE BID [facility]


   Last Admin: 01/24/19 09:13 Dose:  400 mg


Aspirin (Aspirin)  325 mg PER TUBE DAILY [facility]


   Last Admin: 01/24/19 09:13 Dose:  325 mg


Bisacodyl (Dulcolax)  10 mg ME DAILY PRN


   PRN Reason: Constipation


   Last Admin: 01/13/19 09:44 Dose:  10 mg


Carvedilol (Coreg)  3.125 mg PER TUBE BID-[facility]


   Last Admin: 01/24/19 09:13 Dose:  3.125 mg


Dextrose/Water (Dextrose 50%)  25 gm SLOW IVP PRN PRN


   PRN Reason: Hypoglycemia


Enoxaparin Sodium (Lovenox)  30 mg SC 0900 [facility]


   Last Admin: 01/24/19 09:13 Dose:  30 mg


Glucagon (Glucagon)  1 mg IM PRN PRN


   PRN Reason: Hypoglycemia


Norepinephrine Bitartrate (Levophed)  250 mls @ 0 mls/hr IVPB INF [facility]; Protocol


   Last Admin: 01/10/19 14:43 Dose:  250 mls


Fentanyl Citrate 2,000 mcg/ (Sodium Chloride)  100 mls @ 0 mls/hr IV INF [facility]; 

Protocol


   Stop: 02/07/19 14:44


   Last Admin: 01/21/19 07:02 Dose:  100 mls


Fentanyl Citrate (Fentanyl Bolus)  250 mls @ 0 mls/hr IVPB PRN PRN


   PRN Reason: Breakthrough pain/agitation


   Stop: 02/07/19 14:44


Vasopressin 40 unit/Miscellaneous Medication 1 each/ Sodium Chloride  102 mls @ 

0 mls/hr IV INF [facility]; Protocol


   PRN Reason: SBP>90


Sodium Chloride (Normal Saline 0.9%)  1,000 mls @ 50 mls/hr IV .Q20H [facility]


   Last Admin: 01/23/19 17:10 Dose:  1,000 mls


Ceftriaxone Sodium 1 gm/ (Sodium Chloride)  100 mls @ 100 mls/hr IVPB 1500 [facility]


   Last Admin: 01/23/19 16:55 Dose:  100 mls


Dexmedetomidine HCl 400 mcg/ (Sodium Chloride)  100 mls @ 0 mls/hr IVPB INF [facility]

; Protocol


   Last Admin: 01/24/19 06:14 Dose:  100 mls


Dextrose/Water (D5w)  1,000 mls @ 0 mls/hr IV .Q0M PRN


   PRN Reason: Hypoglycemia


Insulin Human Lispro (Humalog)  0 units SC .MODERATE SLIDING SC PRN


   PRN Reason: Moderate Correctional Scale


   Last Admin: 01/24/19 12:54 Dose:  2 unit


Insulin Human Regular (Humulin R)  0 units SC .MODERATE SLIDING SC PRN; Protocol


   PRN Reason: MODERATE SLIDING SCALE


   Last Admin: 01/17/19 17:19 Dose:  2 units


Laxative/Stool Softener (Laxative Of Choice)  1 each PO DAILY PRN


   PRN Reason: Constipation


Lorazepam (Ativan)  2 mg SLOW IVP Q1H PRN


   PRN Reason: Breakthrough agitation


   Stop: 02/07/19 14:44


   Last Admin: 01/21/19 03:50 Dose:  2 mg


Magnesium Hydroxide (Milk Of Magnesium)  30 ml PO DAILY PRN


   PRN Reason: Constipation


   Last Admin: 01/12/19 08:32 Dose:  30 ml


Metoclopramide HCl (Reglan)  10 mg IVP Q6HR [facility]


   Last Admin: 01/24/19 12:25 Dose:  10 mg


Nitroglycerin (Nitro-Bid 2% Ointment)  1 inch TOP Q8HR [facility]


   Last Admin: 01/24/19 07:39 Dose:  1 inch


Discontinue Previous Narcotic Pain Medications And Benzodiazepines  1 each FS 

.ONE [facility]


   Stop: 02/07/19 14:44


Ondansetron HCl (Zofran Odt)  4 mg PO Q6H PRN


   PRN Reason: Nausea/Vomiting


Ondansetron HCl (Zofran)  4 mg IVP Q6H PRN


   PRN Reason: Nausea/Vomiting


Pantoprazole Sodium (Protonix)  40 mg IVP Q12HR [facility]


   Last Admin: 01/24/19 09:21 Dose:  40 mg


Risperidone (Risperidone)  0.5 mg PO BID [facility]


   Last Admin: 01/24/19 09:13 Dose:  0.5 mg


Silver Sulfadiazine (Silvadene)  0 gm TOP BID [facility]


   Last Admin: 01/24/19 09:14 Dose:  1 applic


Sodium Chloride (Flush - Normal Saline)  10 ml IVF Q12HR [facility]


   Last Admin: 01/24/19 09:14 Dose:  10 ml


Sodium Chloride (Flush - Normal Saline)  10 ml IVF PRN PRN


   PRN Reason: Saline Flush


   Last Admin: 01/23/19 09:52 Dose:  10 ml


Sodium Chloride (Flush - Normal Saline)  10 ml IV Q12HR [facility]


   Last Admin: 01/24/19 09:14 Dose:  10 ml

## 2019-01-24 NOTE — PRG
DATE OF SERVICE:  01/24/2019



SUBJECTIVE:  Mr. Bojorquez had a fever of 101 earlier today, and there were some

concerns with his right upper extremity.  He is getting dialysis tomorrow after the

catheterization.  He is going to have a heart catheterization because of concerns

with unstable coronary syndrome associated with chest pain.  He is much more alert.

He follows commands and establishes eye contact.  He is making sense.  He is weak to

talk, but I can understand a few spoken words.  No headaches.  No chest pain at this

moment.  No abdominal pain.  He still has a Tom catheter with good urinary output.

 His I's and O's have been positive for the past many days. 



OBJECTIVE:  VITAL SIGNS:  T-max 100.6 on the 21st and looks like he had 101 earlier

today, that is not recorded in the computer here.  His respiratory rate is 24 to 30,

O2 saturation 100%, 74 beats per minute, and /65. 

EXTREMITIES:  The left lower extremity with those areas of what appears to be

superficial necrosis.  The wound care person has been gently debriding the physical

measures, and there seems to be a healthy tissue right under that layer of necrosis.

 He has a right tunneled hemodialysis catheter __________ Trialysis catheter being

used for IV access.  His ocular movements are conjugate.  A little bit of

periorbital edema.  The right elbow has somewhat swollen olecranon bursa, but it

does not appear to be tender and no erythema.  He does have some pain on

mobilization of the upper extremity, but it seems to hurt all over anyways when he

is moved. 

LUNGS:  Symmetric air entry.  Tracheostomy appears okay. 

ABDOMEN:  Gastrostomy exit site appears okay.  Abdomen soft and not distended.



LABORATORY DATA:  White cell count 5.9, hemoglobin 7.6, platelets 223 with

creatinine of 1.53.  Two sets of blood cultures have been drawn just now.  Chest

x-ray from this morning with some mild congestion. 



ASSESSMENT AND PLAN:  Severe Streptococcus pyogenes infection with bacteremia,

multiorgan dysfunction with steady improvement, now with concerns regarding an

unstable coronary syndrome, going for catheterization tomorrow and dialysis after

that.  Hopefully, the dialysis catheter can be removed after the last dialysis

tomorrow.  In the meantime, the fever may be related to the catheter with

colonization by resistant pathogens and yeast, and we will add broad-spectrum

coverage until the blood cultures are resulted.  Ideally, one would like to remove

this as soon as possible, but we will have to wait until tomorrow's procedure.  The

right upper extremity is a concern too, but it seems to be less of an issue.  We

will ultrasound that area to make sure there is no deep vein thrombosis.  The

olecranon bursa will continue to be observed, but it does not look like it is

infected at this point in time.  He has diarrhea still, but it has been Clostridium

difficile negative. 







Job ID:  [id number]

## 2019-01-24 NOTE — PRG
DATE OF SERVICE:  01/24/2019



SUBJECTIVE:  Danial Bojorquez was febrile today. 



He had no new complaints.  He has a chronic bursal fluid collection in his right

elbow and this is a little tender, but his wife says that is always there. 



He had no other new exam findings.



OBJECTIVE:  VITAL SIGNS:  Remained stable.  Blood pressure 133/55, heart rate 77
,

respiratory rates in the low 20s. 

GENERAL:  We actually placed him on a trach collar at tonight, but we will 
continue

with nocturnal ventilation. 

LUNGS:  Clear. 

HEART:  Regular rhythm.  S1 and S2 are normal. 

ABDOMEN:  Soft. 

EXTREMITIES:  Bandaged.



DIAGNOSTIC STUDIES:  Chest radiograph still shows pulmonary edema.  He is still

being dialyzed. 



Blood cultures were drawn today. 



White count 5.9, hemoglobin 7.6, platelets 223.  Electrolytes are normal.

Creatinine is 1.53.  PH 7.51, CO2 of 35, PO2 of 110. 



IMPRESSION:  

1. Respiratory failure associated with Strep pyogenes infection in his leg.

2. Obesity, deconditioning, possible sleep apnea.

3. Status post tracheostomy and feeding tube placement.

4. Fever today.  Dialysis access would be the source I am concerned about.  One

culture is to be drawn from the access and one from the peripheral site.  Dr. Mazariegos

will decide on antibiotics.  It would not be unreasonable just to continue with

careful observation. 



We will continue with slow attempts at weaning.  We have made some progress this

week.  I met with the wife and answered all of her questions.  Critical care 
time 30

minutes. 



crirical care time 30 minutes 



Job ID:  [id number]



MTDD

## 2019-01-24 NOTE — PRG
DATE OF SERVICE:  01/24/2019



SUBJECTIVE:  Mr. Bojorquez is a 68-year-old white male, who was admitted for 
sepsis.

He developed acute kidney injury secondary to presumed ATN.  We have placed him 
on

dialysis due to volume overload.  Renal function is relatively stable with this

patient.  His most recent creatinine is noted at 1.53 and he is diuresing.  
However,

he developed cardiac event with chest pain and EKG changes.  Cardiology has

evaluated the patient.  He has recommended a cardiac cath tomorrow.  I did 
discuss

the case with Dr. Truong.  We will work around our schedule with dialysis.  We

will probably dialyze him after the said cardiac cath.  I think he will 
tolerate the

said procedure. 



No acute events noted.



OBJECTIVE:  VITAL SIGNS:  Blood pressure 137/55, heart rate 74, respiratory rate

12, pulse ox 100%. 

GENERAL:  The patient is awake, comfortable, obese, not in distress. 

SKIN:  Adequate turgor. 

HEENT/NECK:  He has a slightly pale conjunctivae.  Anicteric sclerae.  No neck 
mass.

 No carotid bruits.  No JVD. 

CHEST:  No deformities. 

LUNGS:  Decreased breath sounds. 

HEART:  Normal sinus rhythm.  No murmur.  No gallops.  No rubs. 

ABDOMEN:  Globular, soft, nontender.  Positive for PEG tube. 

EXTREMITIES:  Trace edema.  Please note, the patient has a tracheostomy.



MEDICATIONS:  Medications of January 24, 2019, reviewed.



LABORATORY DATA:  Laboratories on January 24, 2019, potassium 3.6, BUN 25,

creatinine 1.53.  Hemoglobin 7.6, white count 5.9. 



ASSESSMENT AND PLAN:  

1. Acute kidney injury - secondary to presumed ischemic acute tubular necrosis.

Continue supportive care.  Continued dialytic intervention.  We will dialyze him

immediately after his cardiac catheterization. 

2. Coronary artery disease - for planned cardiac cath.  Possibility of 3-vessel

disease 

remains with this patient.

3. Acute respiratory failure - the patient is status post tracheostomy, stable,

oxygenating well. 

4. Sepsis - the patient is noted on antibiotics.  Overall, prognosis remains 
guarded.







Job ID:  [id number]



MTDD

## 2019-01-25 LAB
ANION GAP SERPL CALC-SCNC: 12 MMOL/L (ref 10–20)
BASOPHILS # BLD AUTO: 0 THOU/UL (ref 0–0.2)
BASOPHILS NFR BLD AUTO: 0.5 % (ref 0–1)
BUN SERPL-MCNC: 32 MG/DL (ref 8.4–25.7)
CALCIUM SERPL-MCNC: 8.3 MG/DL (ref 7.8–10.44)
CHLORIDE SERPL-SCNC: 108 MMOL/L (ref 98–107)
CO2 SERPL-SCNC: 24 MMOL/L (ref 23–31)
CREAT CL PREDICTED SERPL C-G-VRATE: 73 ML/MIN (ref 70–130)
EOSINOPHIL # BLD AUTO: 0.2 THOU/UL (ref 0–0.7)
EOSINOPHIL NFR BLD AUTO: 3 % (ref 0–10)
GLUCOSE SERPL-MCNC: 149 MG/DL (ref 80–115)
HGB BLD-MCNC: 7.5 G/DL (ref 14–18)
LYMPHOCYTES # BLD: 1 THOU/UL (ref 1.2–3.4)
LYMPHOCYTES NFR BLD AUTO: 13.8 % (ref 21–51)
MCH RBC QN AUTO: 30.3 PG (ref 27–31)
MCV RBC AUTO: 94.8 FL (ref 78–98)
MONOCYTES # BLD AUTO: 0.4 THOU/UL (ref 0.11–0.59)
MONOCYTES NFR BLD AUTO: 6.4 % (ref 0–10)
NEUTROPHILS # BLD AUTO: 5.3 THOU/UL (ref 1.4–6.5)
NEUTROPHILS NFR BLD AUTO: 76.3 % (ref 42–75)
PLATELET # BLD AUTO: 214 THOU/UL (ref 130–400)
POTASSIUM SERPL-SCNC: 3.6 MMOL/L (ref 3.5–5.1)
RBC # BLD AUTO: 2.48 MILL/UL (ref 4.7–6.1)
SODIUM SERPL-SCNC: 140 MMOL/L (ref 136–145)
VANCOMYCIN SERPL-MCNC: 15.1 UG/ML
WBC # BLD AUTO: 6.9 THOU/UL (ref 4.8–10.8)

## 2019-01-25 PROCEDURE — 02703DZ DILATION OF CORONARY ARTERY, ONE ARTERY WITH INTRALUMINAL DEVICE, PERCUTANEOUS APPROACH: ICD-10-PCS | Performed by: INTERNAL MEDICINE

## 2019-01-25 PROCEDURE — 4A023N7 MEASUREMENT OF CARDIAC SAMPLING AND PRESSURE, LEFT HEART, PERCUTANEOUS APPROACH: ICD-10-PCS | Performed by: INTERNAL MEDICINE

## 2019-01-25 PROCEDURE — B2111ZZ FLUOROSCOPY OF MULTIPLE CORONARY ARTERIES USING LOW OSMOLAR CONTRAST: ICD-10-PCS | Performed by: INTERNAL MEDICINE

## 2019-01-25 PROCEDURE — B2151ZZ FLUOROSCOPY OF LEFT HEART USING LOW OSMOLAR CONTRAST: ICD-10-PCS | Performed by: INTERNAL MEDICINE

## 2019-01-25 RX ADMIN — NITROGLYCERIN SCH INCH: 20 OINTMENT TOPICAL at 22:25

## 2019-01-25 RX ADMIN — Medication SCH ML: at 22:28

## 2019-01-25 RX ADMIN — MEROPENEM AND SODIUM CHLORIDE SCH MLS: 1 INJECTION, SOLUTION INTRAVENOUS at 17:30

## 2019-01-25 RX ADMIN — FLUCONAZOLE SCH MLS: 2 INJECTION, SOLUTION INTRAVENOUS at 17:46

## 2019-01-25 RX ADMIN — SILVER SULFADIAZINE SCH APPLIC: 10 CREAM TOPICAL at 23:35

## 2019-01-25 RX ADMIN — INSULIN LISPRO PRN UNIT: 100 INJECTION, SOLUTION INTRAVENOUS; SUBCUTANEOUS at 01:37

## 2019-01-25 RX ADMIN — SILVER SULFADIAZINE SCH APPLIC: 10 CREAM TOPICAL at 12:40

## 2019-01-25 RX ADMIN — Medication SCH ML: at 22:27

## 2019-01-25 RX ADMIN — Medication SCH ML: at 13:03

## 2019-01-25 RX ADMIN — NITROGLYCERIN SCH: 20 OINTMENT TOPICAL at 14:00

## 2019-01-25 RX ADMIN — MEROPENEM AND SODIUM CHLORIDE SCH MLS: 1 INJECTION, SOLUTION INTRAVENOUS at 04:15

## 2019-01-25 RX ADMIN — NITROGLYCERIN SCH: 20 OINTMENT TOPICAL at 06:49

## 2019-01-25 NOTE — PDOC.PN
- Subjective


Encounter Start Date: 01/25/19


Encounter Start Time: 12:35


Subjective: awakens to touch


-: is on vent





- Objective


Resuscitation Status - Order Detail:





01/07/19 04:33


Resuscitation Status Routine 


   Resuscitation Status: FULL: Full Resuscitation








MAR Reviewed: Yes


Vital Signs & Weight: 


 Vital Signs (12 hours)











  Temp Pulse Resp BP Pulse Ox


 


 01/25/19 12:55   76  28 H   100


 


 01/25/19 10:54   54 L   123/44 L 


 


 01/25/19 06:40   68   140/64 


 


 01/25/19 06:39   73  29 H   100


 


 01/25/19 06:00    25 H  


 


 01/25/19 04:00  100.0 F H   26 H  


 


 01/25/19 02:31   75   129/57 L 


 


 01/25/19 02:00    24 H  








 Weight











Admit Weight                   249 lb


 


Weight                         249 lb 1.957 oz











 Most Recent Monitor Data











Heart Rate from ECG            72


 


NIBP                           140/64


 


NIBP BP-Mean                   89


 


Respiration from ECG           29


 


SpO2                           100














I&O: 


 











 01/24/19 01/25/19 01/26/19





 06:59 06:59 06:59


 


Intake Total 2887 4339 


 


Output Total 1098 1727 30


 


Balance 1789 2612 -30











Result Diagrams: 


 01/25/19 04:47





 01/25/19 04:47


Additional Labs: 


 Accuchecks











  01/25/19 01/25/19 01/24/19





  10:05 01:30 15:49


 


POC Glucose  137 H  180 H  195 H














  01/24/19





  12:53


 


POC Glucose  177 H














Phys Exam





- Physical Examination


HEENT: PERRLA, sclera anicteric


Neck: no JVD


trach+


Respiratory: no wheezing, no rales


Cardiovascular: RRR, no significant murmur


Gastrointestinal: soft, no distention, positive bowel sounds


peg+


Musculoskeletal: pulses present, edema present


Neurological: non-focal, moves all 4 limbs





Dx/Plan


(1) Acute respiratory failure with hypoxia


Code(s): J96.01 - ACUTE RESPIRATORY FAILURE WITH HYPOXIA   Status: Acute   

Comment: s/p trach   





(2) Toxic metabolic encephalopathy


Code(s): G92 - TOXIC ENCEPHALOPATHY   Status: Acute   





(3) Sepsis


Code(s): A41.9 - SEPSIS, UNSPECIFIED ORGANISM   Status: Acute   


Qualifiers: 


   Sepsis type: Streptococcus group B   Qualified Code(s): A40.1 - Sepsis due 

to streptococcus, group B   





(4) Physical deconditioning


Code(s): R53.81 - OTHER MALAISE   Status: Acute   





(5) Acute renal failure


Status: Acute   


Qualifiers: 


   Acute renal failure type: with acute tubular necrosis   Qualified Code(s): 

N17.0 - Acute kidney failure with tubular necrosis   


Comment: On hemodialysis   





(6) CAD (coronary artery disease)


Code(s): I25.10 - ATHSCL HEART DISEASE OF NATIVE CORONARY ARTERY W/O ANG PCTRS 

  Status: Chronic   


Qualifiers: 


   Coronary Disease-Associated Artery/Lesion type: native artery   Native vs. 

transplanted heart: native heart   Associated angina: without angina   

Qualified Code(s): I25.10 - Atherosclerotic heart disease of native coronary 

artery without angina pectoris   





(7) CHF (congestive heart failure)


Code(s): I50.9 - HEART FAILURE, UNSPECIFIED   Status: Acute   


Qualifiers: 


   Heart failure type: combined systolic and diastolic 


Comment: ef of 45%   





(8) DM type 2 (diabetes mellitus, type 2)


Status: Chronic   


Qualifiers: 


   Diabetes mellitus long term insulin use: with long term use   Diabetes 

mellitus complication status: with kidney complications   Diabetes mellitus 

complication detail: with chronic kidney disease   Chronic kidney disease stage

: on chronic dialysis   Qualified Code(s): E11.22 - Type 2 diabetes mellitus 

with diabetic chronic kidney disease; N18.6 - End stage renal disease; Z79.4 - 

Long term (current) use of insulin; Z99.2 - Dependence on renal dialysis   





(9) HTN (hypertension)


Code(s): I10 - ESSENTIAL (PRIMARY) HYPERTENSION   Status: Chronic   


Qualifiers: 


   Hypertension type: essential hypertension   Qualified Code(s): I10 - 

Essential (primary) hypertension   





(10) Cellulitis and abscess of left leg


Code(s): L03.116 - CELLULITIS OF LEFT LOWER LIMB; L02.416 - CUTANEOUS ABSCESS 

OF LEFT LOWER LIMB   Status: Acute   Comment: resolving.   





(11) Group A streptococcal infection


Code(s): B95.0 - STREPTOCOCCUS, GROUP A, CAUSING DISEASES CLASSD ELSWHR   Status

: Acute   





- Plan


had cath this am, will f/u with report


-: on vanc, meropenem, fluconazole, repeat cultures 1/2 g + cocci


-: has been on trach collar yesterday


-: has severe deconditioning, will need ltac/rehab


-: prognosis guarded, continue asp, plavix, amiodarone, coreg





* .








Review of Systems





- Medications/Allergies


Allergies/Adverse Reactions: 


 Allergies











Allergy/AdvReac Type Severity Reaction Status Date / Time


 


morphine Allergy  Anaphylaxis Verified 01/07/19 03:57











Medications: 


 Current Medications





Acetaminophen (Tylenol)  1,000 mg PO Q6H PRN


   PRN Reason: Mild Pain (1-3)


   Last Admin: 01/24/19 14:59 Dose:  1,000 mg


Albuterol/Ipratropium (Duoneb)  3 ml NEB [id number] [facility]


   Last Admin: 01/25/19 12:55 Dose:  3 ml


Amiodarone HCl (Cordarone)  400 mg PER TUBE BID [facility]


   Last Admin: 01/24/19 20:53 Dose:  400 mg


Aspirin (Aspirin)  325 mg PER TUBE DAILY [facility]


   Last Admin: 01/24/19 09:13 Dose:  325 mg


Aspirin (Aspirin Chewable)  81 mg PO DAILY [facility]


Bisacodyl (Dulcolax)  10 mg CT DAILY PRN


   PRN Reason: Constipation


   Last Admin: 01/13/19 09:44 Dose:  10 mg


Carvedilol (Coreg)  3.125 mg PER TUBE BID-[facility]


   Last Admin: 01/24/19 17:56 Dose:  3.125 mg


Clopidogrel Bisulfate (Plavix)  75 mg PO DAILY [facility]


Dextrose/Water (Dextrose 50%)  25 gm SLOW IVP PRN PRN


   PRN Reason: Hypoglycemia


Glucagon (Glucagon)  1 mg IM PRN PRN


   PRN Reason: Hypoglycemia


Norepinephrine Bitartrate (Levophed)  250 mls @ 0 mls/hr IVPB INF [facility]; Protocol


   Last Admin: 01/10/19 14:43 Dose:  250 mls


Fentanyl Citrate 2,000 mcg/ (Sodium Chloride)  100 mls @ 0 mls/hr IV INF [facility]; 

Protocol


   Stop: 02/07/19 14:44


   Last Admin: 01/21/19 07:02 Dose:  100 mls


Fentanyl Citrate (Fentanyl Bolus)  250 mls @ 0 mls/hr IVPB PRN PRN


   PRN Reason: Breakthrough pain/agitation


   Stop: 02/07/19 14:44


Vasopressin 40 unit/Miscellaneous Medication 1 each/ Sodium Chloride  102 mls @ 

0 mls/hr IV INF PRN; Protocol


   PRN Reason: SBP>90


Dexmedetomidine HCl 400 mcg/ (Sodium Chloride)  100 mls @ 0 mls/hr IVPB INF [facility]

; Protocol


   Last Admin: 01/25/19 12:27 Dose:  100 mls


Dextrose/Water (D5w)  1,000 mls @ 0 mls/hr IV .Q0M PRN


   PRN Reason: Hypoglycemia


Fluconazole/Sodium Chloride (200 mg/ Device)  100 mls @ 100 mls/hr IVPB 1800 [facility]


   Last Admin: 01/24/19 17:57 Dose:  100 mls


Meropenem 1 gm/ Device  50 mls @ 100 mls/hr IVPB 0430,1630 [facility]


   Last Admin: 01/25/19 04:15 Dose:  50 mls


Vancomycin HCl 1.5 gm/ Sodium (Chloride)  300 mls @ 200 mls/hr IVPB WILLCALL [facility]


Vancomycin HCl 1.25 gm/ Sodium (Chloride)  250 mls @ 166.667 mls/hr IVPB 

[facility]ILLCALL [facility]


Vancomycin HCl 1 gm/ Device  200 mls @ 200 mls/hr IVPB WILLCALL [facility]


Vancomycin HCl 750 mg/ Sodium (Chloride)  250 mls @ 250 mls/hr IVPB WILLCALL [facility]


Sodium Chloride (Normal Saline 0.9%)  1,000 mls @ 125 mls/hr IV .Q8H [facility]


Insulin Human Lispro (Humalog)  0 units SC .MODERATE SLIDING SC PRN


   PRN Reason: Moderate Correctional Scale


   Last Admin: 01/25/19 01:37 Dose:  2 unit


Insulin Human Regular (Humulin R)  0 units SC .MODERATE SLIDING SC PRN; Protocol


   PRN Reason: MODERATE SLIDING SCALE


   Last Admin: 01/17/19 17:19 Dose:  2 units


Laxative/Stool Softener (Laxative Of Choice)  1 each PO DAILY PRN


   PRN Reason: Constipation


Lorazepam (Ativan)  2 mg SLOW IVP Q1H PRN


   PRN Reason: Breakthrough agitation


   Stop: 02/07/19 14:44


   Last Admin: 01/21/19 03:50 Dose:  2 mg


Magnesium Hydroxide (Milk Of Magnesium)  30 ml PO DAILY PRN


   PRN Reason: Constipation


   Last Admin: 01/12/19 08:32 Dose:  30 ml


Metoclopramide HCl (Reglan)  10 mg IVP Q6HR [facility]


   Last Admin: 01/25/19 06:24 Dose:  10 mg


Nitroglycerin (Nitro-Bid 2% Ointment)  1 inch TOP Q8HR [facility]


   Last Admin: 01/25/19 06:49 Dose:  Not Given


Nitroglycerin (Nitrostat)  0.4 mg SL Q5MIN PRN


   PRN Reason: Chest Pain


Discontinue Previous Narcotic Pain Medications And Benzodiazepines  1 each FS 

.ONE [facility]


   Stop: 02/07/19 14:44


Hold Vancomycin For (Level >20)  0 each FS .AT DIALYSIS [facility]


Ondansetron HCl (Zofran Odt)  4 mg PO Q6H PRN


   PRN Reason: Nausea/Vomiting


Ondansetron HCl (Zofran)  4 mg IVP Q6H PRN


   PRN Reason: Nausea/Vomiting


Pantoprazole Sodium (Protonix)  40 mg IVP Q12HR [facility]


   Last Admin: 01/25/19 13:02 Dose:  40 mg


Risperidone (Risperidone)  0.5 mg PO BID [facility]


   Last Admin: 01/24/19 20:54 Dose:  0.5 mg


Silver Sulfadiazine (Silvadene)  0 gm TOP BID [facility]


   Last Admin: 01/25/19 12:40 Dose:  1 applic


Sodium Chloride (Flush - Normal Saline)  10 ml IVF Q12HR [facility]


   Last Admin: 01/25/19 13:03 Dose:  10 ml


Sodium Chloride (Flush - Normal Saline)  10 ml IVF PRN PRN


   PRN Reason: Saline Flush


   Last Admin: 01/23/19 09:52 Dose:  10 ml


Sodium Chloride (Flush - Normal Saline)  10 ml IV Q12HR [facility]


   Last Admin: 01/25/19 13:03 Dose:  10 ml

## 2019-01-25 NOTE — CCL
PROCEDURE:

Selective coronary arteriography, stent placement in the distal right coronary 
artery to the ostial right coronary artery. 

 

INDICATION:

Chest pain and ST segment depression in someone with known coronary artery 
disease.  

 

Patient  was brought to cardiac cath lab and the left groin was prepped and 
draped in usual fashion.  Patient had dialysis catheter in the right femoral 
artery. A 6-Kazakh sheath was inserted and heparin 3000 units given.  A 5-
Kazakh left 4 and 5-Kazakh right 4 were used for diagnostic arteriography. 

 

Patient was given Angiomax bolus and drip as well as Plavix 600 mg. During the 
procedure he received multiple doses of IV versed 1 mg and IV Fentanyl 25 mg 
for sedation which at times was difficult. 

 

Right 4 guide catheter was inserted and a floppy choice wire was advanced to 
the distal RCA. The wire would not adequately advance. Multiple other guide 
catheters were attempted including 6 Kazakh AL1, 6 Kazakh AL2, 6 Kazakh RCV, 6 
Kazakh IM guide catheter, 6 Kazakh multipurpose guide catheter. The problem was 
that there was a 70% very near ostial lesion and difficulty with engaging the 
right coronary artery as well as advancing any balloons passed this area of 
stenosis. Finally the decision was made to go back to the right 4 guide. A 
[employer] wire was used. Emerge 3.0 x 20 mm balloon was the inserted into the 
proximal portion of the RCA and the lesion very near the ostium was dilated. An 
Emerge 4.0 x 15 mm balloon was also inserted and the proximal portion was 
dilated. This 4.0 was then advanced further down the RCA and inflated to very 
low pressures in the areas of severe stenosis (in-stent restenosis) in the mid 
RCA. Rebel 3.5 x 20 mm, 3.5 x 24 mm and 3.5 x 28 mm stents were positioned and 
deployed. Then a Rebel 4.0 x 20 mm was positioned proximally extending into the
  ostium and was positioned and deployed. Final result was excellent. The 
sheaths were sutured in place. Patient was transferred to the CCU.

 

RESULTS:

 

PRESSURES:

Aorta 132/57, mean of 88. 

 

CORONARY ARTERIOGRAPHY:.

1.  The left main had a 20% stenosis.

2.  The LAD had a 30% proximal stenosis. The LAD was a wrapped around the apex 
to the inferior wall.

3.  The circumflex was small with 30% mid stenosis.

4.  The right coronary artery had a 70% near ostial stenosis that was proximal 
stents. In the mid RCA there was a 90%, 70% and 90% in-stent restenosis.  

 

INTERVENTION RESULTS:

The three lesions in the mid RCA were reduced to 0%. The near ostial lesion was 
reduced to 0%.

 

IMPRESSION:

1.  One vessel coronary artery disease (RCA).

2.  Successful bare metal stent from the distal to the ostial right coronary 
artery. 

DWIGHT

## 2019-01-25 NOTE — PRG
DATE OF SERVICE:  01/25/2019



SUBJECTIVE:  Mr. Bojorquez is awake.  He is in no distress.  He had a catheterization,

and couple of stents were placed.  He was dialyzed today.  He is still having fever

intermittently.  T-max 101.4 yesterday at 12 o'clock.  It is now 100. 



OBJECTIVE:  GENERAL:  He is awake and oriented.  Follows commands. 

HEAD AND NECK:  Tracheostomy in place with normal-appearing exit site. 

LUNGS:  Coarse breath sounds. 

HEART:  S1 and S2.  Regular rate. 

ABDOMEN:  Soft.  Still has a left-sided port for the angiogram and a right Trialysis

catheter. 

EXTREMITIES:  He is able to move extremities.



LABORATORY DATA:  White cell count 6.9, hemoglobin 7.5, platelets 214.  Creatinine

1.56. 



ASSESSMENT AND DISCUSSION:  Severe Streptococcus pyogenes bacteremia with soft

tissue infection.  The left lower extremity is progressing well.  Still with areas

of superficial necrosis.  The patient now has had fever recrudescence.  He had chest

pain, and two stents were placed following angiogram today.  Hopefully, we will be

able to stop dialysis and remove the catheter in the right groin, which I suspect is

colonized by either a bacterial pathogen or yeast.  The right upper extremity issues

do not seem to be causing problem today.  I do not think there is evidence to

suggest inflammatory arthropathy there at this point in time. 







Job ID:  [id number]

## 2019-01-26 LAB
ALBUMIN SERPL BCG-MCNC: 2.1 G/DL (ref 3.4–4.8)
ALP SERPL-CCNC: 61 U/L (ref 40–150)
ALT SERPL W P-5'-P-CCNC: 29 U/L (ref 8–55)
ANION GAP SERPL CALC-SCNC: 13 MMOL/L (ref 10–20)
AST SERPL-CCNC: 21 U/L (ref 5–34)
BASOPHILS # BLD AUTO: 0 THOU/UL (ref 0–0.2)
BASOPHILS NFR BLD AUTO: 0.7 % (ref 0–1)
BILIRUB SERPL-MCNC: 0.3 MG/DL (ref 0.2–1.2)
BUN SERPL-MCNC: 22 MG/DL (ref 8.4–25.7)
CALCIUM SERPL-MCNC: 8.2 MG/DL (ref 7.8–10.44)
CHLORIDE SERPL-SCNC: 104 MMOL/L (ref 98–107)
CO2 SERPL-SCNC: 24 MMOL/L (ref 23–31)
CREAT CL PREDICTED SERPL C-G-VRATE: 80 ML/MIN (ref 70–130)
EOSINOPHIL # BLD AUTO: 0.2 THOU/UL (ref 0–0.7)
EOSINOPHIL NFR BLD AUTO: 4.3 % (ref 0–10)
GLOBULIN SER CALC-MCNC: 4.6 G/DL (ref 2.4–3.5)
GLUCOSE SERPL-MCNC: 148 MG/DL (ref 80–115)
HGB BLD-MCNC: 7.3 G/DL (ref 14–18)
LYMPHOCYTES # BLD: 1.1 THOU/UL (ref 1.2–3.4)
LYMPHOCYTES NFR BLD AUTO: 18.9 % (ref 21–51)
MCH RBC QN AUTO: 30.9 PG (ref 27–31)
MCV RBC AUTO: 93.6 FL (ref 78–98)
MONOCYTES # BLD AUTO: 0.3 THOU/UL (ref 0.11–0.59)
MONOCYTES NFR BLD AUTO: 5.7 % (ref 0–10)
NEUTROPHILS # BLD AUTO: 4.1 THOU/UL (ref 1.4–6.5)
NEUTROPHILS NFR BLD AUTO: 70.4 % (ref 42–75)
PLATELET # BLD AUTO: 178 THOU/UL (ref 130–400)
POTASSIUM SERPL-SCNC: 3.7 MMOL/L (ref 3.5–5.1)
RBC # BLD AUTO: 2.36 MILL/UL (ref 4.7–6.1)
SODIUM SERPL-SCNC: 137 MMOL/L (ref 136–145)
WBC # BLD AUTO: 5.8 THOU/UL (ref 4.8–10.8)

## 2019-01-26 RX ADMIN — ASPIRIN 81 MG CHEWABLE TABLET SCH: 81 TABLET CHEWABLE at 09:45

## 2019-01-26 RX ADMIN — Medication SCH ML: at 19:56

## 2019-01-26 RX ADMIN — INSULIN LISPRO PRN UNIT: 100 INJECTION, SOLUTION INTRAVENOUS; SUBCUTANEOUS at 16:17

## 2019-01-26 RX ADMIN — NITROGLYCERIN SCH INCH: 20 OINTMENT TOPICAL at 16:20

## 2019-01-26 RX ADMIN — NITROGLYCERIN SCH: 20 OINTMENT TOPICAL at 22:16

## 2019-01-26 RX ADMIN — NITROGLYCERIN SCH INCH: 20 OINTMENT TOPICAL at 06:02

## 2019-01-26 RX ADMIN — Medication SCH ML: at 19:59

## 2019-01-26 RX ADMIN — MEROPENEM AND SODIUM CHLORIDE SCH MLS: 1 INJECTION, SOLUTION INTRAVENOUS at 03:29

## 2019-01-26 RX ADMIN — FLUCONAZOLE SCH MLS: 2 INJECTION, SOLUTION INTRAVENOUS at 18:20

## 2019-01-26 RX ADMIN — SILVER SULFADIAZINE SCH APPLIC: 10 CREAM TOPICAL at 09:44

## 2019-01-26 RX ADMIN — MEROPENEM AND SODIUM CHLORIDE SCH MLS: 1 INJECTION, SOLUTION INTRAVENOUS at 16:23

## 2019-01-26 RX ADMIN — SILVER SULFADIAZINE SCH APPLIC: 10 CREAM TOPICAL at 21:00

## 2019-01-26 RX ADMIN — Medication SCH ML: at 09:43

## 2019-01-26 NOTE — PDOC.CTH
Cardiology Progress Note





- Subjective





No new issues. 





- Objective


 Vital Signs











  Pulse Resp Pulse Ox


 


 01/26/19 13:38  70  24 H  100


 


 01/26/19 07:29  67  28 H  100


 


 01/26/19 06:00   27 H 


 


 01/26/19 04:00   19 








 











Admit Weight                   249 lb


 


Weight                         249 lb 1.957 oz














 











 01/25/19 01/26/19 01/27/19





 06:59 06:59 06:59


 


Intake Total 4339 2103 


 


Output Total 1727 1193 


 


Balance 2612 910 














- Physical Examination


General/Neuro: NAD


Neck: no JVD present


Lungs: unlabored respirations


Heart: RRR


Abdomen: NT/ND


Extremities: + edema B (2+)





- Telemetry


Telemetry Rhythm: NSR





- Labs


Result Diagrams: 


 01/26/19 03:25





 01/26/19 03:25


 Troponin/CKMB











Troponin I  0.249 ng/mL (< 0.028)  H  01/23/19  12:03    














- Assessment/Plan





1. CAD


2. S/P BMS to RCA


3. Acute hypoxic respiratory insufficiency


4. S/P Trach.


5. CLARENCE requiring dialysis





PLAN:


- Continue CAM with Plavix and Aspirin


- Continue BB.


- Continue other meds.

## 2019-01-26 NOTE — PRG
DATE OF SERVICE:  01/26/2019



PULMONARY/CRITICAL CARE PROGRESS NOTE



SUBJECTIVE:  He seems to be doing fairly well.  Has no acute complaints.  He is

currently off the ventilator, on a trach collar. 



OBJECTIVE:  VITAL SIGNS:  On exam, his temperature is 98.8 with T-max of 100.0,

pulse 69, blood pressure 141/56, O2 saturation 99%.  Intake 2103, output 1193. 

HEENT:  Unremarkable. 

NECK:  No JVD. 

LUNGS:  Clear anteriorly. 

CARDIOVASCULAR:  S1 and S2, regular. 

ABDOMEN:  Soft and obese.  PEG tube noted. 

EXTREMITIES:  He has dialysis catheter in the right groin.  He has an I and D in the

left groin. 



LABORATORY DATA:  White blood cell count 5.8, hematocrit 22.1, platelet count 178.

Activated clotting time 171.  Sodium 137, potassium 3.7, chloride 104, CO2 of 24,

BUN 22, creatinine 1.4, glucose 148. 



ASSESSMENT:  

1. Acute respiratory failure requiring mechanical ventilation and tracheostomy

placement-now on trach collar most of the time. 

2. Obesity.

3. Coronary artery disease requiring stent.

4. Renal failure requiring intermittent hemodialysis.



PLAN:  

1. Supportive care, on and off ventilator as tolerated.

2. Hopeful removal of the right groin catheter at earliest convenience.

3. Reviewed medications.  Antibiotics are currently being written for by Dr. Mazariegos.

We will continue to monitor pulmonary issues. 





Job ID:  [id number]

## 2019-01-26 NOTE — PRG
DATE OF SERVICE:  01/26/2019



RENAL MEDICINE



SUBJECTIVE:  Mr. Bojorquez is a 68-year-old white male, who was admitted for sepsis,

developed multiorgan dysfunction including acute kidney injury from ATN.  He also

underwent a cardiac cath yesterday and single-vessel disease was noted.  A

successful bare metal stent was placed distal to the ostial right coronary artery

disease.  We continued to place him on maintenance hemodialysis.  He did undergo

dialysis after the said cardiac cath.  This morning, the patient is stable.  No

acute complaints. 



OBJECTIVE:  VITAL SIGNS:  Blood pressure is noted at 141/64 with heart rate 69,

respiratory rate 27, and pulse ox 99%. 

GENERAL:  Awake, supine, comfortable, not in distress. 

SKIN:  Adequate turgor. 

HEENT:  He has a slightly pale conjunctivae.  Anicteric sclerae.  No neck mass.  No

carotid bruits.  No JVD. 

CHEST:  No deformities. 

LUNGS:  Decreased breath sounds. 

HEART:  Normal sinus rhythm.  No murmur.  No gallops.  No rubs. 

ABDOMEN:  Globular, soft.  Positive for PEG. 

EXTREMITIES:  Positive for edema. 

NEUROLOGICAL:  Awake and follows simple commands, not in distress.  Positive for

tracheostomy.  Please note, he is currently on a T-piece. 



MEDICATIONS:  Medications of January 26, 2019 reviewed.



LABORATORY DATA:  Laboratories of January 26, 2019, white count 5.8, hemoglobin 7.3.

 Sodium 137, potassium 3.7, chloride 104, carbon dioxide 24, BUN 22, creatinine

1.43, glucose 148, calcium 8.2, and albumin 2.1. 



ASSESSMENT AND PLAN:  

1. Acute kidney injury secondary to presumed ischemic acute tubular

necrosis-hemodialysis was initiated due to volume overload.  He looks euvolemic

today.  No indication for an emergent hemodialysis.  He did receive a 4-hour

hemodialysis yesterday. 

2. Coronary artery disease-status post cardiac cath with coronary stent placement.

3. Acute respiratory failure, off vent, currently on a T-piece, doing well.

4. Sepsis, clinically much improved. 

Overall, prognosis remains guarded.







Job ID:  [id number]

## 2019-01-26 NOTE — PDOC.PN
- Subjective


Encounter Start Date: 01/26/19


Encounter Start Time: 07:45


Subjective: on trach collar, awakens easily


-: not fully oriented





- Objective


Resuscitation Status - Order Detail:





01/07/19 04:33


Resuscitation Status Routine 


   Resuscitation Status: FULL: Full Resuscitation








MAR Reviewed: Yes


Vital Signs & Weight: 


 Vital Signs (12 hours)











  Pulse Resp Pulse Ox


 


 01/26/19 13:38  70  24 H  100


 


 01/26/19 07:29  67  28 H  100


 


 01/26/19 06:00   27 H 


 


 01/26/19 04:00   19 








 Weight











Admit Weight                   249 lb


 


Weight                         249 lb 1.957 oz











 Most Recent Monitor Data











Heart Rate from ECG            70


 


NIBP                           142/62


 


NIBP BP-Mean                   88


 


Respiration from ECG           30


 


SpO2                           100














I&O: 


 











 01/25/19 01/26/19 01/27/19





 06:59 06:59 06:59


 


Intake Total 4339 2103 


 


Output Total 1727 1193 


 


Balance 2612 910 











Result Diagrams: 


 01/26/19 03:25





 01/26/19 03:25


Additional Labs: 


 Accuchecks











  01/26/19 01/26/19 01/25/19





  06:18 00:02 17:23


 


POC Glucose  146 H  147 H  136 H














Phys Exam





- Physical Examination


HEENT: PERRLA, sclera anicteric


Neck: no JVD


trach+


Respiratory: no wheezing, no rales


Cardiovascular: RRR, no significant murmur


Gastrointestinal: soft, no distention, positive bowel sounds


peg+


Musculoskeletal: pulses present, edema present


Neurological: non-focal, moves all 4 limbs





Dx/Plan


(1) Acute respiratory failure with hypoxia


Code(s): J96.01 - ACUTE RESPIRATORY FAILURE WITH HYPOXIA   Status: Acute   

Comment: s/p trach   





(2) Toxic metabolic encephalopathy


Code(s): G92 - TOXIC ENCEPHALOPATHY   Status: Acute   





(3) Sepsis


Code(s): A41.9 - SEPSIS, UNSPECIFIED ORGANISM   Status: Acute   


Qualifiers: 


   Sepsis type: Streptococcus group B   Qualified Code(s): A40.1 - Sepsis due 

to streptococcus, group B   





(4) Physical deconditioning


Code(s): R53.81 - OTHER MALAISE   Status: Acute   Comment: severe   





(5) Acute renal failure


Status: Acute   


Qualifiers: 


   Acute renal failure type: with acute tubular necrosis   Qualified Code(s): 

N17.0 - Acute kidney failure with tubular necrosis   


Comment: On hemodialysis   





(6) CAD (coronary artery disease)


Code(s): I25.10 - ATHSCL HEART DISEASE OF NATIVE CORONARY ARTERY W/O ANG PCTRS 

  Status: Chronic   


Qualifiers: 


   Coronary Disease-Associated Artery/Lesion type: native artery   Native vs. 

transplanted heart: native heart   Associated angina: without angina   

Qualified Code(s): I25.10 - Atherosclerotic heart disease of native coronary 

artery without angina pectoris   


Comment: s/p cath with bare metal stent to rca   





(7) CHF (congestive heart failure)


Code(s): I50.9 - HEART FAILURE, UNSPECIFIED   Status: Acute   


Qualifiers: 


   Heart failure type: combined systolic and diastolic 


Comment: ef of 45%   





(8) DM type 2 (diabetes mellitus, type 2)


Status: Chronic   


Qualifiers: 


   Diabetes mellitus long term insulin use: with long term use   Diabetes 

mellitus complication status: with kidney complications   Diabetes mellitus 

complication detail: with chronic kidney disease   Chronic kidney disease stage

: on chronic dialysis   Qualified Code(s): E11.22 - Type 2 diabetes mellitus 

with diabetic chronic kidney disease; N18.6 - End stage renal disease; Z79.4 - 

Long term (current) use of insulin; Z99.2 - Dependence on renal dialysis   





(9) HTN (hypertension)


Code(s): I10 - ESSENTIAL (PRIMARY) HYPERTENSION   Status: Chronic   


Qualifiers: 


   Hypertension type: essential hypertension   Qualified Code(s): I10 - 

Essential (primary) hypertension   





(10) Cellulitis and abscess of left leg


Code(s): L03.116 - CELLULITIS OF LEFT LOWER LIMB; L02.416 - CUTANEOUS ABSCESS 

OF LEFT LOWER LIMB   Status: Acute   Comment: resolving.   





(11) Group A streptococcal infection


Code(s): B95.0 - STREPTOCOCCUS, GROUP A, CAUSING DISEASES CLASSD ELSWHR   Status

: Acute   





- Plan


repeat blood cs are growing coag -ve staph


-: is on vanc and meropenem with diflucan


-: continue asp, plavix, coreg, amiodarone


-: alb in 2.1, has severe deconditioning


-: prognosis guarded, will need aggressive PT





* .


Had urine output of 1 litre, has anasarca, likely will need to continue HD, ?

diuretics per nephrology advice.





Review of Systems





- Medications/Allergies


Allergies/Adverse Reactions: 


 Allergies











Allergy/AdvReac Type Severity Reaction Status Date / Time


 


morphine Allergy  Anaphylaxis Verified 01/07/19 03:57











Medications: 


 Current Medications





Acetaminophen (Tylenol)  1,000 mg PO Q6H PRN


   PRN Reason: Mild Pain (1-3)


   Last Admin: 01/24/19 14:59 Dose:  1,000 mg


Albuterol/Ipratropium (Duoneb)  3 ml NEB [id number] [facility]


   Last Admin: 01/26/19 13:38 Dose:  3 ml


Amiodarone HCl (Cordarone)  400 mg PER TUBE BID [facility]


   Last Admin: 01/26/19 09:43 Dose:  400 mg


Aspirin (Aspirin Chewable)  81 mg PO DAILY [facility]


   Last Admin: 01/26/19 09:45 Dose:  Not Given


Bisacodyl (Dulcolax)  10 mg CT DAILY PRN


   PRN Reason: Constipation


   Last Admin: 01/13/19 09:44 Dose:  10 mg


Carvedilol (Coreg)  3.125 mg PER TUBE BID-[facility]


   Last Admin: 01/26/19 09:43 Dose:  3.125 mg


Clopidogrel Bisulfate (Plavix)  75 mg PO DAILY [facility]


   Last Admin: 01/26/19 09:43 Dose:  75 mg


Dextrose/Water (Dextrose 50%)  25 gm SLOW IVP PRN PRN


   PRN Reason: Hypoglycemia


Glucagon (Glucagon)  1 mg IM PRN PRN


   PRN Reason: Hypoglycemia


Norepinephrine Bitartrate (Levophed)  250 mls @ 0 mls/hr IVPB INF [facility]; Protocol


   Last Admin: 01/10/19 14:43 Dose:  250 mls


Fentanyl Citrate 2,000 mcg/ (Sodium Chloride)  100 mls @ 0 mls/hr IV INF [facility]; 

Protocol


   Stop: 02/07/19 14:44


   Last Admin: 01/21/19 07:02 Dose:  100 mls


Fentanyl Citrate (Fentanyl Bolus)  250 mls @ 0 mls/hr IVPB PRN PRN


   PRN Reason: Breakthrough pain/agitation


   Stop: 02/07/19 14:44


Vasopressin 40 unit/Miscellaneous Medication 1 each/ Sodium Chloride  102 mls @ 

0 mls/hr IV INF PRN; Protocol


   PRN Reason: SBP>90


Dexmedetomidine HCl 400 mcg/ (Sodium Chloride)  100 mls @ 0 mls/hr IVPB INF [facility]

; Protocol


   Last Admin: 01/26/19 09:20 Dose:  100 mls


Dextrose/Water (D5w)  1,000 mls @ 0 mls/hr IV .Q0M PRN


   PRN Reason: Hypoglycemia


Fluconazole/Sodium Chloride (200 mg/ Device)  100 mls @ 100 mls/hr IVPB 1800 [facility]


   Last Admin: 01/25/19 17:46 Dose:  100 mls


Meropenem 1 gm/ Device  50 mls @ 100 mls/hr IVPB 0430,1630 [facility]


   Last Admin: 01/26/19 03:29 Dose:  50 mls


Vancomycin HCl 1.5 gm/ Sodium (Chloride)  300 mls @ 200 mls/hr IVPB [facility]


Vancomycin HCl 1.25 gm/ Sodium (Chloride)  250 mls @ 166.667 mls/hr IVPB 

[facility]


Vancomycin HCl 1 gm/ Device  200 mls @ 200 mls/hr IVPB [facility]


Vancomycin HCl 750 mg/ Sodium (Chloride)  250 mls @ 250 mls/hr IVPB [facility]


   Last Admin: 01/25/19 15:41 Dose:  250 mls


Insulin Human Lispro (Humalog)  0 units SC .MODERATE SLIDING SC PRN


   PRN Reason: Moderate Correctional Scale


   Last Admin: 01/25/19 01:37 Dose:  2 unit


Insulin Human Regular (Humulin R)  0 units SC .MODERATE SLIDING SC PRN; Protocol


   PRN Reason: MODERATE SLIDING SCALE


   Last Admin: 01/17/19 17:19 Dose:  2 units


Laxative/Stool Softener (Laxative Of Choice)  1 each PO DAILY PRN


   PRN Reason: Constipation


Lorazepam (Ativan)  2 mg SLOW IVP Q1H PRN


   PRN Reason: Breakthrough agitation


   Stop: 02/07/19 14:44


   Last Admin: 01/21/19 03:50 Dose:  2 mg


Magnesium Hydroxide (Milk Of Magnesium)  30 ml PO DAILY PRN


   PRN Reason: Constipation


   Last Admin: 01/12/19 08:32 Dose:  30 ml


Nitroglycerin (Nitro-Bid 2% Ointment)  1 inch TOP Q8HR [facility]


   Last Admin: 01/26/19 06:02 Dose:  1 inch


Nitroglycerin (Nitrostat)  0.4 mg SL Q5MIN PRN


   PRN Reason: Chest Pain


Discontinue Previous Narcotic Pain Medications And Benzodiazepines  1 each FS 

.ONE [facility]


   Stop: 02/07/19 14:44


Hold Vancomycin For (Level >20)  0 each FS .AT DIALYSIS [facility]


Ondansetron HCl (Zofran Odt)  4 mg PO Q6H PRN


   PRN Reason: Nausea/Vomiting


Ondansetron HCl (Zofran)  4 mg IVP Q6H PRN


   PRN Reason: Nausea/Vomiting


Pantoprazole Sodium (Protonix)  40 mg IVP Q12HR [facility]


   Last Admin: 01/26/19 09:43 Dose:  40 mg


Risperidone (Risperidone)  0.5 mg PO BID [facility]


   Last Admin: 01/26/19 10:48 Dose:  0.5 mg


Silver Sulfadiazine (Silvadene)  0 gm TOP BID [facility]


   Last Admin: 01/26/19 09:44 Dose:  1 applic


Sodium Chloride (Flush - Normal Saline)  10 ml IVF Q12HR [facility]


   Last Admin: 01/26/19 09:43 Dose:  10 ml


Sodium Chloride (Flush - Normal Saline)  10 ml IVF PRN PRN


   PRN Reason: Saline Flush


   Last Admin: 01/23/19 09:52 Dose:  10 ml


Sodium Chloride (Flush - Normal Saline)  10 ml IV Q12HR [facility]


   Last Admin: 01/25/19 22:28 Dose:  10 ml

## 2019-01-27 LAB
ANION GAP SERPL CALC-SCNC: 13 MMOL/L (ref 10–20)
BASOPHILS # BLD AUTO: 0 THOU/UL (ref 0–0.2)
BASOPHILS NFR BLD AUTO: 0.6 % (ref 0–1)
BUN SERPL-MCNC: 31 MG/DL (ref 8.4–25.7)
CALCIUM SERPL-MCNC: 8.2 MG/DL (ref 7.8–10.44)
CHLORIDE SERPL-SCNC: 106 MMOL/L (ref 98–107)
CO2 SERPL-SCNC: 23 MMOL/L (ref 23–31)
CREAT CL PREDICTED SERPL C-G-VRATE: 76 ML/MIN (ref 70–130)
EOSINOPHIL # BLD AUTO: 0.4 THOU/UL (ref 0–0.7)
EOSINOPHIL NFR BLD AUTO: 6.2 % (ref 0–10)
GLUCOSE SERPL-MCNC: 154 MG/DL (ref 80–115)
HGB BLD-MCNC: 8 G/DL (ref 14–18)
LYMPHOCYTES # BLD: 1.2 THOU/UL (ref 1.2–3.4)
LYMPHOCYTES NFR BLD AUTO: 16.9 % (ref 21–51)
MCH RBC QN AUTO: 30.9 PG (ref 27–31)
MCV RBC AUTO: 92.9 FL (ref 78–98)
MONOCYTES # BLD AUTO: 0.4 THOU/UL (ref 0.11–0.59)
MONOCYTES NFR BLD AUTO: 5.3 % (ref 0–10)
NEUTROPHILS # BLD AUTO: 4.9 THOU/UL (ref 1.4–6.5)
NEUTROPHILS NFR BLD AUTO: 71 % (ref 42–75)
PLATELET # BLD AUTO: 176 THOU/UL (ref 130–400)
POTASSIUM SERPL-SCNC: 3.4 MMOL/L (ref 3.5–5.1)
RBC # BLD AUTO: 2.6 MILL/UL (ref 4.7–6.1)
SODIUM SERPL-SCNC: 139 MMOL/L (ref 136–145)
WBC # BLD AUTO: 6.9 THOU/UL (ref 4.8–10.8)

## 2019-01-27 RX ADMIN — Medication SCH ML: at 09:33

## 2019-01-27 RX ADMIN — INSULIN LISPRO PRN UNIT: 100 INJECTION, SOLUTION INTRAVENOUS; SUBCUTANEOUS at 18:28

## 2019-01-27 RX ADMIN — Medication SCH ML: at 20:32

## 2019-01-27 RX ADMIN — INSULIN LISPRO PRN UNIT: 100 INJECTION, SOLUTION INTRAVENOUS; SUBCUTANEOUS at 06:41

## 2019-01-27 RX ADMIN — NITROGLYCERIN SCH INCH: 20 OINTMENT TOPICAL at 04:48

## 2019-01-27 RX ADMIN — MEROPENEM AND SODIUM CHLORIDE SCH MLS: 1 INJECTION, SOLUTION INTRAVENOUS at 16:21

## 2019-01-27 RX ADMIN — MEROPENEM AND SODIUM CHLORIDE SCH MLS: 1 INJECTION, SOLUTION INTRAVENOUS at 04:47

## 2019-01-27 RX ADMIN — INSULIN LISPRO PRN UNIT: 100 INJECTION, SOLUTION INTRAVENOUS; SUBCUTANEOUS at 13:07

## 2019-01-27 RX ADMIN — SILVER SULFADIAZINE SCH APPLIC: 10 CREAM TOPICAL at 20:31

## 2019-01-27 RX ADMIN — FLUCONAZOLE SCH MLS: 2 INJECTION, SOLUTION INTRAVENOUS at 18:19

## 2019-01-27 RX ADMIN — ASPIRIN 81 MG CHEWABLE TABLET SCH MG: 81 TABLET CHEWABLE at 09:33

## 2019-01-27 RX ADMIN — SILVER SULFADIAZINE SCH APPLIC: 10 CREAM TOPICAL at 09:35

## 2019-01-27 RX ADMIN — Medication SCH ML: at 20:31

## 2019-01-27 NOTE — PRG
DATE OF SERVICE:  01/27/2019



SUBJECTIVE:  Mr. Bojorquez is doing very well.  He is up in a chair.  He used the

ventilator briefly last night.  His wife is at the bedside. 



OBJECTIVE:  VITAL SIGNS:  On exam, temperature 98.8, pulse 70, blood pressure

124/50.  He continues on Precedex. 

HEENT:  Unremarkable. 

NECK:  No adenopathy or JVD. 

CHEST:  Coarse rhonchi especially around his neck area. 

CARDIAC:  S1 and S2, regular. 

ABDOMEN:  Soft.  PEG tube noted. 

EXTREMITIES:  No edema.



LABORATORY DATA:  White blood cell count 6.9, hematocrit 24.2, and platelet count

176.  Sodium 139, potassium 3.4, chloride 106, CO2 of 23, BUN 31, creatinine 1.5,

and glucose 154. 



ASSESSMENT:  

1. Respiratory failure, requiring mechanical ventilation and tracheostomy placement.

2. Obesity.

3. Coronary artery disease.

4. Renal failure requiring dialysis.



PLAN:  

1. We will go ahead and try to stop the Precedex and see how he does.  It can always

be restarted if he has trouble. 

2. Discontinue ventilator from the room as he does not seem to need that anymore.

3. Continue aggressive suctioning.

4. Discussed with wife at bedside. 

Above encompassed 35 minutes of critical care time.







Job ID:  [id number]

## 2019-01-27 NOTE — PRG
DATE OF SERVICE:  01/27/2019



OBJECTIVE:  GENERAL:  Awake and alert, follows commands.  Tracheostomy in place.

Gastrostomy in place. 

VITAL SIGNS:  The T-max is 99.6, /76, pulse is 88. 

LUNGS:  Clear. 

HEART:  S1, S2.  Regular rate. 

ABDOMEN:  Soft.



LABORATORY DATA:  White cell count 6.9, hemoglobin 8.0, platelets 176.  Chemistry

with creatinine of 1.5, does not look like he is going to get dialyzed again. 



ASSESSMENT/DISCUSSION:  Severe strep pyogenes infection with left lower extremity

involvement with marked improvement, multiorgan dysfunction with improvement as

well.  Hopefully, we will be able to remove the right groin hemodialysis catheter

and then should be able to discontinue antimicrobials if he remains afebrile in the

next few days. 







Job ID:  [id number]

## 2019-01-27 NOTE — PDOC.CTH
Cardiology Progress Note





- Subjective





No new issues. Sitting on chair.





- Objective


 Vital Signs











  Temp Pulse Pulse Resp BP Pulse Ox


 


 01/27/19 07:13   72   14   99


 


 01/27/19 06:00     18  


 


 01/27/19 04:00  98.8 F    22 H  


 


 01/27/19 02:00     24 H  


 


 01/27/19 00:29  99.1 F   80  18  143/58 H  97


 


 01/27/19 00:00  99.1 F    18   100


 


 01/26/19 23:20   72   28 H   100








 











Admit Weight                   249 lb


 


Weight                         249 lb 1.957 oz














 











 01/26/19 01/27/19 01/28/19





 06:59 06:59 06:59


 


Intake Total 2103 3215 


 


Output Total 1193 1307 


 


Balance 910 1908 














- Physical Examination


General/Neuro: NAD


Neck: no JVD present


Lungs: CTA, unlabored respirations


Heart: RRR


Abdomen: NT/ND


Extremities: + edema B (1+)





- Telemetry


Telemetry Rhythm: NSR





- Labs


Result Diagrams: 


 01/27/19 04:14





 01/27/19 04:14


 Troponin/CKMB











Troponin I  0.249 ng/mL (< 0.028)  H  01/23/19  12:03    














- Assessment/Plan





1. CAD


2. S/P BMS to RCA


3. Acute hypoxic respiratory insufficiency


4. S/P Trach.


5. CLARENCE requiring dialysis





PLAN:


- Continue CAM with Plavix and Aspirin


- Continue BB.


- Continue other meds. 


- CV stable, no new recs.

## 2019-01-27 NOTE — PRG
DATE OF SERVICE:  01/27/2019



RENAL MEDICINE.



SUBJECTIVE:  Mr. Bojorquez is a 68-year-old white male, who was initially admitted for

sepsis with multiorgan dysfunction.  He developed acute kidney injury from presumed

ischemic ATN.  He was placed on dialysis for volume overload.  In the interim, he

had a cardiac cath with coronary artery stent placement.  He is now weaned off his

ventilator.  He also had a tracheostomy and a PEG tube placed.  No acute events

noted last night. 



OBJECTIVE:  VITAL SIGNS:  Blood pressure is 124/50 with a heart rate of 70, and

temperature 98.8. 

GENERAL:  Noted to be awake, alert, comfortable, obese, not in distress. 

SKIN:  Adequate turgor. 

HEENT:  Pinkish conjunctivae.  Anicteric sclerae. 

NECK:  No neck mass.  No carotid bruits.  No JVD.  Positive for tracheostomy.  He is

on T-piece. 

LUNGS:  Harsh breath sounds. 

HEART:  Normal sinus rhythm.  No murmur.  No gallops.  No rubs. 

ABDOMEN:  Globular, soft, and nontender.  Positive for PEG tube. 

EXTREMITIES:  No edema.



MEDICATIONS:  Medications of January 27, 2019, was reviewed.



LABORATORY DATA:  Laboratories of January 27, 2019; white count 6.9, hemoglobin is

8, and hematocrit 24.2.  Sodium 139, potassium 3.4, chloride 106, carbon dioxide 23,

BUN 31, creatinine 1.5, glucose 154, and calcium 8.2. 



ASSESSMENT AND PLAN:  

1. Acute kidney injury secondary to ischemic acute tubular necrosis, stable.  No

indication for any emergent dialysis this morning.  Continue supportive care.  I do 

anticipate some degree of renal recovery, where I can wean him off from dialysis.

2. Sepsis, much improved.

3. Acute respiratory failure, currently on T-piece.

4. Anemia, p.r.n. blood transfusion.







Job ID:  [id number]

## 2019-01-27 NOTE — PDOC.PN
- Subjective


Encounter Start Date: 01/27/19


Encounter Start Time: 09:20


Subjective: is on trach collar, sitting in chair


-: follows verbal stimuli this morning, wife at bedside





- Objective


Resuscitation Status - Order Detail:





01/07/19 04:33


Resuscitation Status Routine 


   Resuscitation Status: FULL: Full Resuscitation








MAR Reviewed: Yes


Vital Signs & Weight: 


 Vital Signs (12 hours)











  Temp Pulse Resp Pulse Ox


 


 01/27/19 07:13   72  14  99


 


 01/27/19 06:00    18 


 


 01/27/19 04:00  98.8 F   22 H 


 


 01/27/19 02:00    24 H 








 Weight











Admit Weight                   249 lb


 


Weight                         249 lb 1.957 oz











 Most Recent Monitor Data











Heart Rate from ECG            85


 


NIBP                           153/74


 


NIBP BP-Mean                   100


 


Respiration from ECG           30


 


SpO2                           96














I&O: 


 











 01/26/19 01/27/19 01/28/19





 06:59 06:59 06:59


 


Intake Total 2103 3215 


 


Output Total 1193 1307 


 


Balance 910 1908 











Result Diagrams: 


 01/27/19 04:14





 01/27/19 04:14


Additional Labs: 


 Accuchecks











  01/27/19 01/27/19 01/27/19





  11:58 06:01 00:25


 


POC Glucose  174 H  169 H  154 H














  01/26/19





  16:14


 


POC Glucose  167 H














Phys Exam





- Physical Examination


HEENT: PERRLA, moist MMs


Neck: no JVD


trach+


Respiratory: no wheezing, no rales


Cardiovascular: RRR, no significant murmur


Gastrointestinal: soft, non-tender, positive bowel sounds


peg+


Musculoskeletal: pulses present, edema present


Neurological: non-focal, moves all 4 limbs





Dx/Plan


(1) Acute respiratory failure with hypoxia


Code(s): J96.01 - ACUTE RESPIRATORY FAILURE WITH HYPOXIA   Status: Acute   

Comment: s/p trach   





(2) Toxic metabolic encephalopathy


Code(s): G92 - TOXIC ENCEPHALOPATHY   Status: Acute   





(3) Sepsis


Code(s): A41.9 - SEPSIS, UNSPECIFIED ORGANISM   Status: Acute   


Qualifiers: 


   Sepsis type: Streptococcus group B   Qualified Code(s): A40.1 - Sepsis due 

to streptococcus, group B   





(4) Physical deconditioning


Code(s): R53.81 - OTHER MALAISE   Status: Acute   Comment: severe   





(5) Acute renal failure


Status: Acute   


Qualifiers: 


   Acute renal failure type: with acute tubular necrosis   Qualified Code(s): 

N17.0 - Acute kidney failure with tubular necrosis   


Comment: HD held to see if he responds   





(6) CAD (coronary artery disease)


Code(s): I25.10 - ATHSCL HEART DISEASE OF NATIVE CORONARY ARTERY W/O ANG PCTRS 

  Status: Chronic   


Qualifiers: 


   Coronary Disease-Associated Artery/Lesion type: native artery   Native vs. 

transplanted heart: native heart   Associated angina: without angina   

Qualified Code(s): I25.10 - Atherosclerotic heart disease of native coronary 

artery without angina pectoris   


Comment: s/p cath with bare metal stent to rca   





(7) CHF (congestive heart failure)


Code(s): I50.9 - HEART FAILURE, UNSPECIFIED   Status: Acute   


Qualifiers: 


   Heart failure type: combined systolic and diastolic 


Comment: ef of 45%   





(8) DM type 2 (diabetes mellitus, type 2)


Status: Chronic   


Qualifiers: 


   Diabetes mellitus long term insulin use: with long term use   Diabetes 

mellitus complication status: with kidney complications   Diabetes mellitus 

complication detail: with chronic kidney disease   Chronic kidney disease stage

: on chronic dialysis   Qualified Code(s): E11.22 - Type 2 diabetes mellitus 

with diabetic chronic kidney disease; N18.6 - End stage renal disease; Z79.4 - 

Long term (current) use of insulin; Z99.2 - Dependence on renal dialysis   





(9) HTN (hypertension)


Code(s): I10 - ESSENTIAL (PRIMARY) HYPERTENSION   Status: Chronic   


Qualifiers: 


   Hypertension type: essential hypertension   Qualified Code(s): I10 - 

Essential (primary) hypertension   





(10) Cellulitis and abscess of left leg


Code(s): L03.116 - CELLULITIS OF LEFT LOWER LIMB; L02.416 - CUTANEOUS ABSCESS 

OF LEFT LOWER LIMB   Status: Acute   Comment: resolving.   





(11) Group A streptococcal infection


Code(s): B95.0 - STREPTOCOCCUS, GROUP A, CAUSING DISEASES CLASSD ELSWHR   Status

: Acute   





- Plan


is on vanc and meropenem, may de-escalate 


-: continue asp, plavix, coreg, amiodarone, crestor


-: PT for aggressive mobilization 


-: may become a candidate for rehab if he improves some more





* .








Review of Systems





- Medications/Allergies


Allergies/Adverse Reactions: 


 Allergies











Allergy/AdvReac Type Severity Reaction Status Date / Time


 


morphine Allergy  Anaphylaxis Verified 01/07/19 03:57











Medications: 


 Current Medications





Acetaminophen (Tylenol)  1,000 mg PO Q6H PRN


   PRN Reason: Mild Pain (1-3)


   Last Admin: 01/24/19 14:59 Dose:  1,000 mg


Albuterol/Ipratropium (Duoneb)  3 ml NEB [id number] [facility]


   Last Admin: 01/27/19 07:13 Dose:  3 ml


Amiodarone HCl (Cordarone)  400 mg PER TUBE BID [facility]


   Last Admin: 01/27/19 09:34 Dose:  400 mg


Aspirin (Aspirin Chewable)  81 mg PO DAILY [facility]


   Last Admin: 01/27/19 09:33 Dose:  81 mg


Bisacodyl (Dulcolax)  10 mg UT DAILY PRN


   PRN Reason: Constipation


   Last Admin: 01/13/19 09:44 Dose:  10 mg


Carvedilol (Coreg)  3.125 mg PER TUBE BID-[facility]


   Last Admin: 01/27/19 09:34 Dose:  3.125 mg


Clopidogrel Bisulfate (Plavix)  75 mg PO DAILY [facility]


   Last Admin: 01/27/19 09:34 Dose:  75 mg


Dextrose/Water (Dextrose 50%)  25 gm SLOW IVP PRN PRN


   PRN Reason: Hypoglycemia


Glucagon (Glucagon)  1 mg IM PRN PRN


   PRN Reason: Hypoglycemia


Norepinephrine Bitartrate (Levophed)  250 mls @ 0 mls/hr IVPB INF [facility]; Protocol


   Last Admin: 01/10/19 14:43 Dose:  250 mls


Fentanyl Citrate 2,000 mcg/ (Sodium Chloride)  100 mls @ 0 mls/hr IV INF [facility]; 

Protocol


   Stop: 02/07/19 14:44


   Last Admin: 01/21/19 07:02 Dose:  100 mls


Fentanyl Citrate (Fentanyl Bolus)  250 mls @ 0 mls/hr IVPB PRN PRN


   PRN Reason: Breakthrough pain/agitation


   Stop: 02/07/19 14:44


Vasopressin 40 unit/Miscellaneous Medication 1 each/ Sodium Chloride  102 mls @ 

0 mls/hr IV INF PRN; Protocol


   PRN Reason: SBP>90


Dextrose/Water (D5w)  1,000 mls @ 0 mls/hr IV .Q0M PRN


   PRN Reason: Hypoglycemia


Fluconazole/Sodium Chloride (200 mg/ Device)  100 mls @ 100 mls/hr IVPB 1800 [facility]


   Last Admin: 01/26/19 18:20 Dose:  100 mls


Meropenem 1 gm/ Device  50 mls @ 100 mls/hr IVPB 0430,1630 [facility]


   Last Admin: 01/27/19 04:47 Dose:  50 mls


Vancomycin HCl 1.5 gm/ Sodium (Chloride)  300 mls @ 200 mls/hr IVPB WILLCALL [facility]


Vancomycin HCl 1.25 gm/ Sodium (Chloride)  250 mls @ 166.667 mls/hr IVPB 

WILLCALL [facility]


Vancomycin HCl 1 gm/ Device  200 mls @ 200 mls/hr IVPB WILLCALL [facility]


Vancomycin HCl 750 mg/ Sodium (Chloride)  250 mls @ 250 mls/hr IVPB WILLCALL [facility]


   Last Admin: 01/25/19 15:41 Dose:  250 mls


Sodium Chloride (Normal Saline 0.9%)  1,000 mls @ 50 mls/hr IV .Q20H [facility]


   Last Admin: 01/27/19 06:39 Dose:  1,000 mls


Insulin Human Lispro (Humalog)  0 units SC .MODERATE SLIDING SC PRN


   PRN Reason: Moderate Correctional Scale


   Last Admin: 01/27/19 06:41 Dose:  2 unit


Insulin Human Regular (Humulin R)  0 units SC .MODERATE SLIDING SC PRN; Protocol


   PRN Reason: MODERATE SLIDING SCALE


   Last Admin: 01/17/19 17:19 Dose:  2 units


Laxative/Stool Softener (Laxative Of Choice)  1 each PO DAILY PRN


   PRN Reason: Constipation


Lorazepam (Ativan)  2 mg SLOW IVP Q1H PRN


   PRN Reason: Breakthrough agitation


   Stop: 02/07/19 14:44


   Last Admin: 01/27/19 01:50 Dose:  2 mg


Magnesium Hydroxide (Milk Of Magnesium)  30 ml PO DAILY PRN


   PRN Reason: Constipation


   Last Admin: 01/12/19 08:32 Dose:  30 ml


Nitroglycerin (Nitro-Bid 2% Ointment)  1 inch TOP Q8HR [facility]


   Last Admin: 01/27/19 04:48 Dose:  1 inch


Nitroglycerin (Nitrostat)  0.4 mg SL Q5MIN PRN


   PRN Reason: Chest Pain


Discontinue Previous Narcotic Pain Medications And Benzodiazepines  1 each FS 

.ONE [facility]


   Stop: 02/07/19 14:44


Hold Vancomycin For (Level >20)  0 each FS .AT DIALYSIS [facility]


Ondansetron HCl (Zofran Odt)  4 mg PO Q6H PRN


   PRN Reason: Nausea/Vomiting


Ondansetron HCl (Zofran)  4 mg IVP Q6H PRN


   PRN Reason: Nausea/Vomiting


Pantoprazole Sodium (Protonix)  40 mg IVP Q12HR [facility]


   Last Admin: 01/27/19 09:33 Dose:  40 mg


Risperidone (Risperidone)  0.5 mg PO BID [facility]


   Last Admin: 01/27/19 09:33 Dose:  0.5 mg


Silver Sulfadiazine (Silvadene)  0 gm TOP BID [facility]


   Last Admin: 01/27/19 09:35 Dose:  1 applic


Sodium Chloride (Flush - Normal Saline)  10 ml IVF Q12HR [facility]


   Last Admin: 01/27/19 09:33 Dose:  10 ml


Sodium Chloride (Flush - Normal Saline)  10 ml IVF PRN PRN


   PRN Reason: Saline Flush


   Last Admin: 01/23/19 09:52 Dose:  10 ml


Sodium Chloride (Flush - Normal Saline)  10 ml IV Q12HR [facility]


   Last Admin: 01/27/19 09:33 Dose:  10 ml

## 2019-01-28 LAB
ANION GAP SERPL CALC-SCNC: 11 MMOL/L (ref 10–20)
BASOPHILS # BLD AUTO: 0 THOU/UL (ref 0–0.2)
BASOPHILS NFR BLD AUTO: 0.5 % (ref 0–1)
BUN SERPL-MCNC: 29 MG/DL (ref 8.4–25.7)
CALCIUM SERPL-MCNC: 8.8 MG/DL (ref 7.8–10.44)
CHLORIDE SERPL-SCNC: 110 MMOL/L (ref 98–107)
CO2 SERPL-SCNC: 24 MMOL/L (ref 23–31)
CREAT CL PREDICTED SERPL C-G-VRATE: 76 ML/MIN (ref 70–130)
EOSINOPHIL # BLD AUTO: 0.5 THOU/UL (ref 0–0.7)
EOSINOPHIL NFR BLD AUTO: 5.1 % (ref 0–10)
GLUCOSE SERPL-MCNC: 153 MG/DL (ref 80–115)
HGB BLD-MCNC: 8.7 G/DL (ref 14–18)
LYMPHOCYTES # BLD: 1.7 THOU/UL (ref 1.2–3.4)
LYMPHOCYTES NFR BLD AUTO: 19.5 % (ref 21–51)
MCH RBC QN AUTO: 29.6 PG (ref 27–31)
MCV RBC AUTO: 93.7 FL (ref 78–98)
MONOCYTES # BLD AUTO: 0.5 THOU/UL (ref 0.11–0.59)
MONOCYTES NFR BLD AUTO: 5 % (ref 0–10)
NEUTROPHILS # BLD AUTO: 6.3 THOU/UL (ref 1.4–6.5)
NEUTROPHILS NFR BLD AUTO: 70 % (ref 42–75)
PLATELET # BLD AUTO: 203 THOU/UL (ref 130–400)
POTASSIUM SERPL-SCNC: 3.3 MMOL/L (ref 3.5–5.1)
RBC # BLD AUTO: 2.93 MILL/UL (ref 4.7–6.1)
SODIUM SERPL-SCNC: 142 MMOL/L (ref 136–145)
WBC # BLD AUTO: 9 THOU/UL (ref 4.8–10.8)

## 2019-01-28 RX ADMIN — Medication SCH ML: at 09:34

## 2019-01-28 RX ADMIN — ASPIRIN 81 MG CHEWABLE TABLET SCH MG: 81 TABLET CHEWABLE at 09:33

## 2019-01-28 RX ADMIN — MEROPENEM AND SODIUM CHLORIDE SCH MLS: 1 INJECTION, SOLUTION INTRAVENOUS at 04:36

## 2019-01-28 RX ADMIN — Medication SCH ML: at 21:03

## 2019-01-28 RX ADMIN — SILVER SULFADIAZINE SCH APPLIC: 10 CREAM TOPICAL at 09:35

## 2019-01-28 RX ADMIN — SILVER SULFADIAZINE SCH APPLIC: 10 CREAM TOPICAL at 21:04

## 2019-01-28 RX ADMIN — FLUCONAZOLE SCH MLS: 2 INJECTION, SOLUTION INTRAVENOUS at 16:59

## 2019-01-28 RX ADMIN — MEROPENEM AND SODIUM CHLORIDE SCH MLS: 1 INJECTION, SOLUTION INTRAVENOUS at 16:59

## 2019-01-28 RX ADMIN — VANCOMYCIN HYDROCHLORIDE SCH MLS: 1 INJECTION, POWDER, LYOPHILIZED, FOR SOLUTION INTRAVENOUS at 12:01

## 2019-01-28 RX ADMIN — INSULIN LISPRO PRN UNIT: 100 INJECTION, SOLUTION INTRAVENOUS; SUBCUTANEOUS at 12:36

## 2019-01-28 NOTE — PRG
DATE OF SERVICE:  01/28/2019



SUBJECTIVE:  Danial Bojorquez is a 68-year-old gentleman, status post trach for

respiratory failure, renal failure, sepsis, and coronary artery disease. 



He is off the vent, appears quite appropriate.  Denies any distress or pain.  Moves

both extremities. 



OBJECTIVE:  VITAL SIGNS:  Blood pressure is 171/74, saturations are 100%,

respiratory rate 28, and pulse 89. 

CHEST:  Reveal decreased breath sounds without any wheezing. 

CARDIAC:  Normal S1 and S2.  No gallops. 

ABDOMEN:  No masses.



LABORATORY DATA:  White count 9000, H and H 8 and 27, and platelet count is 203. 



All cultures are negative, except for streptococcus.



ASSESSMENT:  

1. Left leg cellulitis, streptococcus.

2. Respiratory failure.

3. Congestive heart failure, pneumonia, and renal failure.



PLAN:  The patient is much improved.  Continue aggressive PT.  Antibiotics as per

Infectious Disease.  Supportive care.  Nutrition. 



We will follow, eventually long-term placement.  One-half hour of critical care time.







Job ID:  [id number]

## 2019-01-28 NOTE — PRG
DATE OF SERVICE:  



RENAL MEDICINE.



SUBJECTIVE:  Mr. Bojorquez is a 68-year-old white male, who was initially admitted for

sepsis.  He developed acute kidney injury, necessitating dialysis.  His renal

function has been stabilizing, he is making adequate urine output.  I evaluated him

this morning.  I feel that we can hold off dialysis and we will re-evaluate his lab

work again tomorrow.  No other complaints.  No acute events. 



In the interim, he also has received a cardiac cath with coronary artery stent

placement. 



OBJECTIVE:  VITAL SIGNS:  Blood pressure is 171/74, heart rate 89, respiratory rate

28, and pulse ox 100%. 

GENERAL:  Awake, comfortable, not in distress. 

HEENT:  Pinkish conjunctivae.  Anicteric sclerae.  Positive for a trach collar. 

LUNGS:  Decreased breath sounds. 

HEART:  Normal sinus rhythm.  No murmur.  No gallops.  No rubs. 

ABDOMEN:  Globular, soft, nontender.  No masses. 

EXTREMITIES:  No edema.  No deformities. 



Please note he has a PEG tube.



MEDICATIONS:  Medications of January 28, 2019, were reviewed.



LABORATORY DATA:  Laboratories of January 27, 2019:  BUN 31, creatinine 1.5.  Basic

met for January 28, 2019, pending; January 27, 2019, hemoglobin 8. 



ASSESSMENT AND PLAN:  

1. Acute kidney injury - secondary to presumed ischemic acute tubular necrosis.

Continue supportive care.  Hold off dialysis today.  Re-evaluate in a.m.  We will

again recheck another basic met tomorrow.  Overall, prognosis remains guarded. 

2. Status post acute respiratory failure, resolved, currently on T-piece.

3. Status post sepsis, doing well.  Continuing antibiotics.





Job ID:  [id number]

## 2019-01-28 NOTE — PDOC.PN
- Subjective


Encounter Start Date: 01/28/19


Encounter Start Time: 07:40


Subjective: is on trach collar, follows verbal stimuli





- Objective


Resuscitation Status - Order Detail:





01/07/19 04:33


Resuscitation Status Routine 


   Resuscitation Status: FULL: Full Resuscitation








MAR Reviewed: Yes


Vital Signs & Weight: 


 Vital Signs (12 hours)











  Temp Pulse Resp Pulse Ox


 


 01/28/19 12:00  98.1 F   


 


 01/28/19 08:00     98


 


 01/28/19 07:46   89  28 H  100


 


 01/28/19 07:00  97.8 F   


 


 01/28/19 04:00  99.9 F H   








 Weight











Admit Weight                   249 lb


 


Weight                         249 lb 1.957 oz











 Most Recent Monitor Data











Heart Rate from ECG            115


 


NIBP                           151/87


 


NIBP BP-Mean                   108


 


Respiration from ECG           32


 


SpO2                           98














I&O: 


 











 01/27/19 01/28/19 01/29/19





 06:59 06:59 06:59


 


Intake Total 3215 2502.9 45


 


Output Total 1307 1855 500


 


Balance 1908 647.9 -455











Result Diagrams: 


 01/28/19 08:23





 01/28/19 08:24


Additional Labs: 


 Accuchecks











  01/28/19 01/28/19 01/28/19





  12:30 05:50 00:23


 


POC Glucose  169 H  138 H  128 H














  01/27/19





  18:03


 


POC Glucose  162 H














Phys Exam





- Physical Examination


HEENT: PERRLA, sclera anicteric


Neck: no JVD


trach+


Respiratory: no wheezing, no rales


Cardiovascular: RRR, no significant murmur


Gastrointestinal: soft, no distention, positive bowel sounds


peg+


Musculoskeletal: pulses present


Neurological: non-focal, moves all 4 limbs


Psychiatric: A&O x 3





Dx/Plan


(1) Acute respiratory failure with hypoxia


Code(s): J96.01 - ACUTE RESPIRATORY FAILURE WITH HYPOXIA   Status: Acute   

Comment: s/p trach   





(2) Toxic metabolic encephalopathy


Code(s): G92 - TOXIC ENCEPHALOPATHY   Status: Acute   Comment: resolving   





(3) Sepsis


Code(s): A41.9 - SEPSIS, UNSPECIFIED ORGANISM   Status: Acute   


Qualifiers: 


   Sepsis type: Streptococcus group B   Qualified Code(s): A40.1 - Sepsis due 

to streptococcus, group B   





(4) Physical deconditioning


Code(s): R53.81 - OTHER MALAISE   Status: Acute   Comment: severe   





(5) Acute renal failure


Status: Acute   


Qualifiers: 


   Acute renal failure type: with acute tubular necrosis   Qualified Code(s): 

N17.0 - Acute kidney failure with tubular necrosis   


Comment: HD held to see if he responds   





(6) CAD (coronary artery disease)


Code(s): I25.10 - ATHSCL HEART DISEASE OF NATIVE CORONARY ARTERY W/O ANG PCTRS 

  Status: Chronic   


Qualifiers: 


   Coronary Disease-Associated Artery/Lesion type: native artery   Native vs. 

transplanted heart: native heart   Associated angina: without angina   

Qualified Code(s): I25.10 - Atherosclerotic heart disease of native coronary 

artery without angina pectoris   


Comment: s/p cath with bare metal stent to rca   





(7) CHF (congestive heart failure)


Code(s): I50.9 - HEART FAILURE, UNSPECIFIED   Status: Acute   


Qualifiers: 


   Heart failure type: combined systolic and diastolic 


Comment: ef of 45%   





(8) DM type 2 (diabetes mellitus, type 2)


Status: Chronic   


Qualifiers: 


   Diabetes mellitus long term insulin use: with long term use   Diabetes 

mellitus complication status: with kidney complications   Diabetes mellitus 

complication detail: with chronic kidney disease   Chronic kidney disease stage

: on chronic dialysis   Qualified Code(s): E11.22 - Type 2 diabetes mellitus 

with diabetic chronic kidney disease; N18.6 - End stage renal disease; Z79.4 - 

Long term (current) use of insulin; Z99.2 - Dependence on renal dialysis   





(9) HTN (hypertension)


Code(s): I10 - ESSENTIAL (PRIMARY) HYPERTENSION   Status: Chronic   


Qualifiers: 


   Hypertension type: essential hypertension   Qualified Code(s): I10 - 

Essential (primary) hypertension   





(10) Cellulitis and abscess of left leg


Code(s): L03.116 - CELLULITIS OF LEFT LOWER LIMB; L02.416 - CUTANEOUS ABSCESS 

OF LEFT LOWER LIMB   Status: Acute   Comment: resolving.   





(11) Group A streptococcal infection


Code(s): B95.0 - STREPTOCOCCUS, GROUP A, CAUSING DISEASES CLASSD ELSWHR   Status

: Acute   





- Plan


is slowly getting better, urine output is improving, last 24 was 1655ml


-: on meropenem and vanc


-: continue amiodarone, asp, coreg


-: risperdal dose is reduced to HS


-: hemostable, PT to mobilize pt as tolerated





* .








Review of Systems





- Medications/Allergies


Allergies/Adverse Reactions: 


 Allergies











Allergy/AdvReac Type Severity Reaction Status Date / Time


 


morphine Allergy  Anaphylaxis Verified 01/07/19 03:57











Medications: 


 Current Medications





Acetaminophen (Tylenol)  1,000 mg PO Q6H PRN


   PRN Reason: Mild Pain (1-3)


   Last Admin: 01/27/19 16:37 Dose:  1,000 mg


Albuterol/Ipratropium (Duoneb)  3 ml NEB [id number] [facility]


   Last Admin: 01/28/19 07:46 Dose:  3 ml


Amiodarone HCl (Cordarone)  400 mg PER TUBE BID [facility]


   Last Admin: 01/28/19 09:33 Dose:  400 mg


Aspirin (Aspirin Chewable)  81 mg PO DAILY [facility]


   Last Admin: 01/28/19 09:33 Dose:  81 mg


Bisacodyl (Dulcolax)  10 mg NE DAILY PRN


   PRN Reason: Constipation


   Last Admin: 01/13/19 09:44 Dose:  10 mg


Carvedilol (Coreg)  6.25 mg PER TUBE BID-[facility]


Clopidogrel Bisulfate (Plavix)  75 mg PO DAILY [facility]


   Last Admin: 01/28/19 09:33 Dose:  75 mg


Dextrose/Water (Dextrose 50%)  25 gm SLOW IVP PRN PRN


   PRN Reason: Hypoglycemia


Glucagon (Glucagon)  1 mg IM PRN PRN


   PRN Reason: Hypoglycemia


Norepinephrine Bitartrate (Levophed)  250 mls @ 0 mls/hr IVPB INF [facility]; Protocol


   Last Admin: 01/10/19 14:43 Dose:  250 mls


Vasopressin 40 unit/Miscellaneous Medication 1 each/ Sodium Chloride  102 mls @ 

0 mls/hr IV INF PRN; Protocol


   PRN Reason: SBP>90


Dextrose/Water (D5w)  1,000 mls @ 0 mls/hr IV .Q0M PRN


   PRN Reason: Hypoglycemia


Fluconazole/Sodium Chloride (200 mg/ Device)  100 mls @ 100 mls/hr IVPB 1800 [facility]


   Last Admin: 01/27/19 18:19 Dose:  100 mls


Meropenem 1 gm/ Device  50 mls @ 100 mls/hr IVPB 0430,1630 [facility]


   Last Admin: 01/28/19 04:36 Dose:  50 mls


Sodium Chloride (Normal Saline 0.9%)  1,000 mls @ 50 mls/hr IV .Q20H [facility]


   Last Admin: 01/28/19 09:37 Dose:  1,000 mls


Vancomycin HCl 1.25 gm/ Sodium (Chloride)  250 mls @ 166.667 mls/hr IVPB Q24HR 

[facility]


   Last Admin: 01/28/19 12:01 Dose:  250 mls


Insulin Human Lispro (Humalog)  0 units SC .MODERATE SLIDING SC PRN


   PRN Reason: Moderate Correctional Scale


   Last Admin: 01/28/19 12:36 Dose:  2 unit


Insulin Human Regular (Humulin R)  0 units SC .MODERATE SLIDING SC PRN; Protocol


   PRN Reason: MODERATE SLIDING SCALE


   Last Admin: 01/17/19 17:19 Dose:  2 units


Laxative/Stool Softener (Laxative Of Choice)  1 each PO DAILY PRN


   PRN Reason: Constipation


Magnesium Hydroxide (Milk Of Magnesium)  30 ml PO DAILY PRN


   PRN Reason: Constipation


   Last Admin: 01/12/19 08:32 Dose:  30 ml


Nitroglycerin (Nitrostat)  0.4 mg SL Q5MIN PRN


   PRN Reason: Chest Pain


Hold Vancomycin For (Level >20)  0 each FS .AT [facility]


Ondansetron HCl (Zofran Odt)  4 mg PO Q6H PRN


   PRN Reason: Nausea/Vomiting


Ondansetron HCl (Zofran)  4 mg IVP Q6H PRN


   PRN Reason: Nausea/Vomiting


Pantoprazole Sodium (Protonix)  40 mg IVP Q12HR [facility]


   Last Admin: 01/28/19 09:34 Dose:  40 mg


Risperidone (Risperidone)  0.5 mg PO HS [facility]


Rosuvastatin Calcium (Crestor)  10 mg PO HS [facility]


   Last Admin: 01/27/19 20:30 Dose:  10 mg


Silver Sulfadiazine (Silvadene)  0 gm TOP BID [facility]


   Last Admin: 01/28/19 09:35 Dose:  1 applic


Sodium Chloride (Flush - Normal Saline)  10 ml IVF Q12HR [facility]


   Last Admin: 01/28/19 09:34 Dose:  10 ml


Sodium Chloride (Flush - Normal Saline)  10 ml IVF PRN PRN


   PRN Reason: Saline Flush


   Last Admin: 01/23/19 09:52 Dose:  10 ml


Sodium Chloride (Flush - Normal Saline)  10 ml IV Q12HR [facility]


   Last Admin: 01/28/19 09:34 Dose:  10 ml

## 2019-01-29 LAB
ANION GAP SERPL CALC-SCNC: 12 MMOL/L (ref 10–20)
BASOPHILS # BLD AUTO: 0 THOU/UL (ref 0–0.2)
BASOPHILS NFR BLD AUTO: 0.5 % (ref 0–1)
BUN SERPL-MCNC: 29 MG/DL (ref 8.4–25.7)
CALCIUM SERPL-MCNC: 8.5 MG/DL (ref 7.8–10.44)
CHLORIDE SERPL-SCNC: 111 MMOL/L (ref 98–107)
CO2 SERPL-SCNC: 23 MMOL/L (ref 23–31)
CREAT CL PREDICTED SERPL C-G-VRATE: 81 ML/MIN (ref 70–130)
EOSINOPHIL # BLD AUTO: 0.5 THOU/UL (ref 0–0.7)
EOSINOPHIL NFR BLD AUTO: 6.4 % (ref 0–10)
GLUCOSE SERPL-MCNC: 173 MG/DL (ref 80–115)
HGB BLD-MCNC: 8.3 G/DL (ref 14–18)
LYMPHOCYTES # BLD: 1.5 THOU/UL (ref 1.2–3.4)
LYMPHOCYTES NFR BLD AUTO: 18.1 % (ref 21–51)
MCH RBC QN AUTO: 30.2 PG (ref 27–31)
MCV RBC AUTO: 92.8 FL (ref 78–98)
MONOCYTES # BLD AUTO: 0.4 THOU/UL (ref 0.11–0.59)
MONOCYTES NFR BLD AUTO: 5.5 % (ref 0–10)
NEUTROPHILS # BLD AUTO: 5.6 THOU/UL (ref 1.4–6.5)
NEUTROPHILS NFR BLD AUTO: 69.4 % (ref 42–75)
PLATELET # BLD AUTO: 198 THOU/UL (ref 130–400)
POTASSIUM SERPL-SCNC: 3.2 MMOL/L (ref 3.5–5.1)
RBC # BLD AUTO: 2.73 MILL/UL (ref 4.7–6.1)
SODIUM SERPL-SCNC: 143 MMOL/L (ref 136–145)
WBC # BLD AUTO: 8 THOU/UL (ref 4.8–10.8)

## 2019-01-29 RX ADMIN — SILVER SULFADIAZINE SCH APPLIC: 10 CREAM TOPICAL at 20:50

## 2019-01-29 RX ADMIN — INSULIN LISPRO PRN UNIT: 100 INJECTION, SOLUTION INTRAVENOUS; SUBCUTANEOUS at 18:19

## 2019-01-29 RX ADMIN — SILVER SULFADIAZINE SCH APPLIC: 10 CREAM TOPICAL at 08:28

## 2019-01-29 RX ADMIN — MEROPENEM AND SODIUM CHLORIDE SCH MLS: 1 INJECTION, SOLUTION INTRAVENOUS at 04:40

## 2019-01-29 RX ADMIN — MEROPENEM AND SODIUM CHLORIDE SCH MLS: 1 INJECTION, SOLUTION INTRAVENOUS at 17:55

## 2019-01-29 RX ADMIN — Medication SCH ML: at 20:49

## 2019-01-29 RX ADMIN — FLUCONAZOLE SCH MLS: 2 INJECTION, SOLUTION INTRAVENOUS at 18:00

## 2019-01-29 RX ADMIN — ASPIRIN 81 MG CHEWABLE TABLET SCH MG: 81 TABLET CHEWABLE at 08:28

## 2019-01-29 RX ADMIN — Medication SCH ML: at 08:22

## 2019-01-29 RX ADMIN — INSULIN LISPRO PRN UNIT: 100 INJECTION, SOLUTION INTRAVENOUS; SUBCUTANEOUS at 10:04

## 2019-01-29 RX ADMIN — Medication SCH ML: at 08:27

## 2019-01-29 RX ADMIN — VANCOMYCIN HYDROCHLORIDE SCH MLS: 1 INJECTION, POWDER, LYOPHILIZED, FOR SOLUTION INTRAVENOUS at 10:49

## 2019-01-29 NOTE — EKG
Test Reason : CK RHYTHM

Blood Pressure : ***/*** mmHG

Vent. Rate : 085 BPM     Atrial Rate : 085 BPM

   P-R Int : 154 ms          QRS Dur : 088 ms

    QT Int : 332 ms       P-R-T Axes : 041 038 151 degrees

   QTc Int : 395 ms

 

Normal sinus rhythm

ST depression, consider subendocardial injury

Nonspecific T wave abnormality

Abnormal ECG

When compared with ECG of 07-JAN-2019 09:43,

ST more depressed in Anterior leads

Nonspecific T wave abnormality, worse in Anterolateral leads

Confirmed by TOAN WALTON (2) on 1/29/2019 7:58:53 PM

 

Referred By:  RADHA           Confirmed By:TOAN WALTON

## 2019-01-29 NOTE — PDOC.PN
- Subjective


Encounter Start Date: 01/29/19


Encounter Start Time: 11:00


Subjective: awake, follows verbal stimuli


-: is on trach collar





- Objective


Resuscitation Status - Order Detail:





01/07/19 04:33


Resuscitation Status Routine 


   Resuscitation Status: FULL: Full Resuscitation








MAR Reviewed: Yes


Vital Signs & Weight: 


 Vital Signs (12 hours)











  Temp Pulse Resp BP Pulse Ox


 


 01/29/19 08:14     175/90 H 


 


 01/29/19 08:00  98.5 F    


 


 01/29/19 07:45      98


 


 01/29/19 07:43   91  21 H   98


 


 01/29/19 04:00  98.9 F    


 


 01/29/19 00:05   87  14   97








 Weight











Admit Weight                   249 lb


 


Weight                         249 lb 1.957 oz











 Most Recent Monitor Data











Heart Rate from ECG            83


 


NIBP                           181/89


 


NIBP BP-Mean                   119


 


Respiration from ECG           7


 


SpO2                           98














I&O: 


 











 01/28/19 01/29/19 01/30/19





 06:59 06:59 06:59


 


Intake Total 2502.9 2309 


 


Output Total 1855 1835 175


 


Balance 647.9 474 -175











Result Diagrams: 


 01/29/19 04:17





 01/29/19 04:17


Additional Labs: 


 Accuchecks











  01/29/19 01/29/19 01/28/19





  09:56 00:36 16:13


 


POC Glucose  188 H  180 H  141 H














  01/28/19





  12:30


 


POC Glucose  169 H














Phys Exam





- Physical Examination


HEENT: PERRLA, moist MMs


Neck: no JVD


trach+


Respiratory: no wheezing, no rales


Cardiovascular: RRR, no significant murmur


Gastrointestinal: soft, non-tender, positive bowel sounds


peg+


Musculoskeletal: pulses present, edema present


Neurological: non-focal, moves all 4 limbs


Psychiatric: normal affect, A&O x 3





Dx/Plan


(1) Acute respiratory failure with hypoxia


Code(s): J96.01 - ACUTE RESPIRATORY FAILURE WITH HYPOXIA   Status: Acute   

Comment: s/p trach   





(2) Toxic metabolic encephalopathy


Code(s): G92 - TOXIC ENCEPHALOPATHY   Status: Acute   Comment: resolving   





(3) Sepsis


Code(s): A41.9 - SEPSIS, UNSPECIFIED ORGANISM   Status: Acute   


Qualifiers: 


   Sepsis type: Streptococcus group B   Qualified Code(s): A40.1 - Sepsis due 

to streptococcus, group B   


Comment: resolving   





(4) Physical deconditioning


Code(s): R53.81 - OTHER MALAISE   Status: Acute   Comment: severe   





(5) Acute renal failure


Status: Acute   


Qualifiers: 


   Acute renal failure type: with acute tubular necrosis   Qualified Code(s): 

N17.0 - Acute kidney failure with tubular necrosis   


Comment: HD held to see if he responds   





(6) CAD (coronary artery disease)


Code(s): I25.10 - ATHSCL HEART DISEASE OF NATIVE CORONARY ARTERY W/O ANG PCTRS 

  Status: Chronic   


Qualifiers: 


   Coronary Disease-Associated Artery/Lesion type: native artery   Native vs. 

transplanted heart: native heart   Associated angina: without angina   

Qualified Code(s): I25.10 - Atherosclerotic heart disease of native coronary 

artery without angina pectoris   


Comment: s/p cath with bare metal stent to rca   





(7) CHF (congestive heart failure)


Code(s): I50.9 - HEART FAILURE, UNSPECIFIED   Status: Acute   


Qualifiers: 


   Heart failure type: combined systolic and diastolic 


Comment: ef of 45%   





(8) DM type 2 (diabetes mellitus, type 2)


Status: Chronic   


Qualifiers: 


   Diabetes mellitus long term insulin use: with long term use   Diabetes 

mellitus complication status: with kidney complications   Diabetes mellitus 

complication detail: with chronic kidney disease   Chronic kidney disease stage

: on chronic dialysis   Qualified Code(s): E11.22 - Type 2 diabetes mellitus 

with diabetic chronic kidney disease; N18.6 - End stage renal disease; Z79.4 - 

Long term (current) use of insulin; Z99.2 - Dependence on renal dialysis   





(9) HTN (hypertension)


Code(s): I10 - ESSENTIAL (PRIMARY) HYPERTENSION   Status: Chronic   


Qualifiers: 


   Hypertension type: essential hypertension   Qualified Code(s): I10 - 

Essential (primary) hypertension   





(10) Cellulitis and abscess of left leg


Code(s): L03.116 - CELLULITIS OF LEFT LOWER LIMB; L02.416 - CUTANEOUS ABSCESS 

OF LEFT LOWER LIMB   Status: Acute   Comment: resolving.   





(11) Group A streptococcal infection


Code(s): B95.0 - STREPTOCOCCUS, GROUP A, CAUSING DISEASES CLASSD ELSWHR   Status

: Acute   





- Plan


is on meropenem and vanc


-: will try speaking valve today and ongoing speech eval


-: continue amiodarone, asp, plavix, coreg and crestor


-: placement per pulm adv


-: PT to mobilize pt more as tolerated





* .








Review of Systems





- Medications/Allergies


Allergies/Adverse Reactions: 


 Allergies











Allergy/AdvReac Type Severity Reaction Status Date / Time


 


morphine Allergy  Anaphylaxis Verified 01/07/19 03:57











Medications: 


 Current Medications





Acetaminophen (Tylenol)  1,000 mg PO Q6H PRN


   PRN Reason: Mild Pain (1-3)


   Last Admin: 01/27/19 16:37 Dose:  1,000 mg


Albuterol/Ipratropium (Duoneb)  3 ml NEB [id number] [facility]


   Last Admin: 01/29/19 07:43 Dose:  3 ml


Amiodarone HCl (Cordarone)  400 mg PER TUBE BID [facility]


   Last Admin: 01/29/19 08:28 Dose:  400 mg


Aspirin (Aspirin Chewable)  81 mg PO DAILY [facility]


   Last Admin: 01/29/19 08:28 Dose:  81 mg


Bisacodyl (Dulcolax)  10 mg MN DAILY PRN


   PRN Reason: Constipation


   Last Admin: 01/13/19 09:44 Dose:  10 mg


Carvedilol (Coreg)  12.5 mg PER TUBE BID-[facility]


Clopidogrel Bisulfate (Plavix)  75 mg PO DAILY [facility]


   Last Admin: 01/29/19 08:28 Dose:  75 mg


Dextrose/Water (Dextrose 50%)  25 gm SLOW IVP PRN PRN


   PRN Reason: Hypoglycemia


Glucagon (Glucagon)  1 mg IM PRN PRN


   PRN Reason: Hypoglycemia


Norepinephrine Bitartrate (Levophed)  250 mls @ 0 mls/hr IVPB INF [facility]; Protocol


   Last Admin: 01/10/19 14:43 Dose:  250 mls


Vasopressin 40 unit/Miscellaneous Medication 1 each/ Sodium Chloride  102 mls @ 

0 mls/hr IV INF [facility]; Protocol


   PRN Reason: SBP>90


Dextrose/Water (D5w)  1,000 mls @ 0 mls/hr IV .Q0M PRN


   PRN Reason: Hypoglycemia


Fluconazole/Sodium Chloride (200 mg/ Device)  100 mls @ 100 mls/hr IVPB 1800 [facility]


   Last Admin: 01/28/19 16:59 Dose:  100 mls


Meropenem 1 gm/ Device  50 mls @ 100 mls/hr IVPB 0430,1630 [facility]


   Last Admin: 01/29/19 04:40 Dose:  50 mls


Sodium Chloride (Normal Saline 0.9%)  1,000 mls @ 50 mls/hr IV .Q20H [facility]


   Last Admin: 01/29/19 10:44 Dose:  1,000 mls


Vancomycin HCl 1.25 gm/ Sodium (Chloride)  250 mls @ 166.667 mls/hr IVPB Q24HR 

[facility]


   Last Admin: 01/29/19 10:49 Dose:  250 mls


Insulin Human Lispro (Humalog)  0 units SC .MODERATE SLIDING SC PRN


   PRN Reason: Moderate Correctional Scale


   Last Admin: 01/29/19 10:04 Dose:  2 unit


Insulin Human Regular (Humulin R)  0 units SC .MODERATE SLIDING SC [facility]; Protocol


   PRN Reason: MODERATE SLIDING SCALE


   Last Admin: 01/17/19 17:19 Dose:  2 units


Laxative/Stool Softener (Laxative Of Choice)  1 each PO DAILY PRN


   PRN Reason: Constipation


Magnesium Hydroxide (Milk Of Magnesium)  30 ml PO DAILY PRN


   PRN Reason: Constipation


   Last Admin: 01/12/19 08:32 Dose:  30 ml


Nitroglycerin (Nitrostat)  0.4 mg SL Q5MIN PRN


   PRN Reason: Chest Pain


Hold Vancomycin For (Level >20)  0 each FS .AT [facility]


Ondansetron HCl (Zofran Odt)  4 mg PO Q6H PRN


   PRN Reason: Nausea/Vomiting


Ondansetron HCl (Zofran)  4 mg IVP Q6H PRN


   PRN Reason: Nausea/Vomiting


Pantoprazole Sodium (Protonix)  40 mg IVP Q12HR [facility]


   Last Admin: 01/29/19 08:22 Dose:  40 mg


Risperidone (Risperidone)  0.5 mg PO [facility]


   Last Admin: 01/28/19 21:03 Dose:  0.5 mg


Rosuvastatin Calcium (Crestor)  10 mg PO [facility]


   Last Admin: 01/28/19 21:03 Dose:  10 mg


Silver Sulfadiazine (Silvadene)  0 gm TOP BID [facility]


   Last Admin: 01/29/19 08:28 Dose:  1 applic


Sodium Chloride (Flush - Normal Saline)  10 ml IVF Q12HR [facility]


   Last Admin: 01/29/19 08:22 Dose:  10 ml


Sodium Chloride (Flush - Normal Saline)  10 ml IVF PRN PRN


   PRN Reason: Saline Flush


   Last Admin: 01/23/19 09:52 Dose:  10 ml


Sodium Chloride (Flush - Normal Saline)  10 ml IV Q12HR [facility]


   Last Admin: 01/29/19 08:27 Dose:  10 ml

## 2019-01-29 NOTE — PRG
DATE OF SERVICE:  01/29/2019



SUBJECTIVE:  This morning, he is awake, alert, and responsive.  __________



OBJECTIVE:  VITAL SIGNS: Blood pressure 175/90, temperature 98, sats are  98% on

trach collar, and pulse 80. 

GENERAL: He is awake and responsive. 

CHEST: Decreased breath sounds. Bilateral rhonchi. 

CARDIAC: Normal S1 and S2. No gallops. 

ABDOMEN: No masses.



LABORATORY DATA:  White count 8000, hemoglobin 8, hematocrit 25, platelet count

normal. Lytes are normal. Creatinine 1.4. 



ASSESSMENT:  

1. Renal failure, resolved.

2. Respiratory failure, resolved, status post trach.

3. Obesity.

4. Encephalopathy.

5. Sepsis syndrome.



PLAN:  He is on antibiotics as per Infectious Disease. All cultures are negative.

His cellulitis is better. We will try and get speech to see him today. Otherwise,

long-term placement. Discussed with . 



One half hour of critical time.







Job ID:  [id number]

## 2019-01-29 NOTE — PRG
DATE OF SERVICE:  



RENAL MEDICINE.



SUBJECTIVE:  Mr. Bojorquez is a 68-year-old white male, who came in with sepsis and

was seen by the Renal Service due to the acute kidney injury.  He was initiated on

dialysis due to volume overload.  Currently, the patient is having some evidence of

renal recovery.  He is making urine output and creatinine is holding steady.  We

will hold off dialysis today.  If renal function continues to remain stable by

tomorrow, consider removing the dialysis catheter.  No other complaints today. 



OBJECTIVE:  VITAL SIGNS:  Blood pressure is 175/90, heart rate 91, respiratory rate

30, pulse ox 100%. 

GENERAL:  Awake, alert, comfortable, not in distress. 

SKIN:  Adequate turgor. 

HEENT:  He has pinkish conjunctivae.  Anicteric sclerae.  No neck mass.  No carotid

bruits.  No JVD.  Positive for tracheostomy. 

LUNGS:  Decreased breath sounds. 

HEART:  Normal sinus rhythm.   No murmur, no gallops or rubs. 

ABDOMEN:  Globular, soft, nontender.  No masses.  Positive for PEG tube. 

EXTREMITIES:  No edema.



MEDICATIONS:  Medications of January 29, 2019 was reviewed.



LABORATORY DATA:  Laboratories of January 29, 2019, white count 8, hemoglobin 8.3.

Sodium 143, potassium 3.2, chloride 111, carbon dioxide 23, BUN 29, creatinine 1.41,

glucose 173, calcium 8.5. 



ASSESSMENT AND PLAN:  

1. Acute kidney injury-secondary to ischemic ATN, much improved renal function.

Most recent GFR is now 50 mL/minute.  The patient is diuresing.  We will hold off 

any dialysis today.  Re-evaluate again in a.m.

2. Mild hypokalemia, p.r.n. potassium replacement.  We will probably give 20 mEq x1 

dose today.

3. Agree with current management.







Job ID:  [id number]

## 2019-01-30 LAB
ANION GAP SERPL CALC-SCNC: 11 MMOL/L (ref 10–20)
BUN SERPL-MCNC: 31 MG/DL (ref 8.4–25.7)
CALCIUM SERPL-MCNC: 8.7 MG/DL (ref 7.8–10.44)
CHLORIDE SERPL-SCNC: 113 MMOL/L (ref 98–107)
CO2 SERPL-SCNC: 25 MMOL/L (ref 23–31)
CREAT CL PREDICTED SERPL C-G-VRATE: 83 ML/MIN (ref 70–130)
GLUCOSE SERPL-MCNC: 166 MG/DL (ref 80–115)
POTASSIUM SERPL-SCNC: 3.3 MMOL/L (ref 3.5–5.1)
SODIUM SERPL-SCNC: 146 MMOL/L (ref 136–145)
VANCOMYCIN TROUGH SERPL-MCNC: 11.3 UG/ML

## 2019-01-30 RX ADMIN — SILVER SULFADIAZINE SCH APPLIC: 10 CREAM TOPICAL at 20:55

## 2019-01-30 RX ADMIN — MEROPENEM AND SODIUM CHLORIDE SCH MLS: 1 INJECTION, SOLUTION INTRAVENOUS at 17:04

## 2019-01-30 RX ADMIN — FLUCONAZOLE SCH MLS: 2 INJECTION, SOLUTION INTRAVENOUS at 17:28

## 2019-01-30 RX ADMIN — INSULIN LISPRO PRN UNIT: 100 INJECTION, SOLUTION INTRAVENOUS; SUBCUTANEOUS at 17:11

## 2019-01-30 RX ADMIN — SILVER SULFADIAZINE SCH APPLIC: 10 CREAM TOPICAL at 08:24

## 2019-01-30 RX ADMIN — INSULIN LISPRO PRN UNIT: 100 INJECTION, SOLUTION INTRAVENOUS; SUBCUTANEOUS at 07:29

## 2019-01-30 RX ADMIN — MEROPENEM AND SODIUM CHLORIDE SCH MLS: 1 INJECTION, SOLUTION INTRAVENOUS at 05:32

## 2019-01-30 RX ADMIN — INSULIN LISPRO PRN UNIT: 100 INJECTION, SOLUTION INTRAVENOUS; SUBCUTANEOUS at 11:32

## 2019-01-30 RX ADMIN — VANCOMYCIN HYDROCHLORIDE SCH MLS: 1 INJECTION, POWDER, LYOPHILIZED, FOR SOLUTION INTRAVENOUS at 11:26

## 2019-01-30 RX ADMIN — Medication SCH ML: at 08:24

## 2019-01-30 RX ADMIN — Medication SCH ML: at 08:23

## 2019-01-30 RX ADMIN — Medication SCH ML: at 20:55

## 2019-01-30 RX ADMIN — ASPIRIN 81 MG CHEWABLE TABLET SCH MG: 81 TABLET CHEWABLE at 08:23

## 2019-01-30 RX ADMIN — INSULIN LISPRO PRN UNIT: 100 INJECTION, SOLUTION INTRAVENOUS; SUBCUTANEOUS at 02:06

## 2019-01-30 NOTE — PDOC.PN
- Subjective


Encounter Start Date: 01/30/19


Encounter Start Time: 10:40


Subjective: awake, feels better


-: follows verbal stimuli


-: is moving all extremities





- Objective


Resuscitation Status - Order Detail:





01/07/19 04:33


Resuscitation Status Routine 


   Resuscitation Status: FULL: Full Resuscitation








MAR Reviewed: Yes


Vital Signs & Weight: 


 Vital Signs (12 hours)











  Temp Pulse Resp BP Pulse Ox


 


 01/30/19 08:00  98.5 F    


 


 01/30/19 07:59     166/82 H 


 


 01/30/19 07:36      100


 


 01/30/19 06:34      99


 


 01/30/19 06:33   86  16   98


 


 01/30/19 04:00  98.8 F    


 


 01/30/19 00:39   81  28 H   97








 Weight











Admit Weight                   249 lb


 


Weight                         249 lb 1.957 oz











 Most Recent Monitor Data











Heart Rate from ECG            74


 


NIBP                           167/78


 


NIBP BP-Mean                   107


 


Respiration from ECG           27


 


SpO2                           98














I&O: 


 











 01/29/19 01/30/19 01/31/19





 06:59 06:59 06:59


 


Intake Total 2309 2412 30


 


Output Total 1835 1965 280


 


Balance 474 447 -250











Result Diagrams: 


 01/29/19 04:17





 01/30/19 09:47


Additional Labs: 


 Accuchecks











  01/30/19 01/30/19 01/29/19





  07:17 02:05 18:16


 


POC Glucose  160 H  201 H  192 H














Phys Exam





- Physical Examination


HEENT: PERRLA, sclera anicteric


Neck: no JVD


trach+


Respiratory: no wheezing, no rales


Cardiovascular: RRR, no significant murmur


Gastrointestinal: soft, no distention, positive bowel sounds


peg+


Musculoskeletal: pulses present


edema is slowly receding


Neurological: non-focal, moves all 4 limbs


Psychiatric: A&O x 3





Dx/Plan


(1) Acute respiratory failure with hypoxia


Code(s): J96.01 - ACUTE RESPIRATORY FAILURE WITH HYPOXIA   Status: Acute   

Comment: s/p trach   





(2) Toxic metabolic encephalopathy


Code(s): G92 - TOXIC ENCEPHALOPATHY   Status: Acute   Comment: resolving   





(3) Sepsis


Code(s): A41.9 - SEPSIS, UNSPECIFIED ORGANISM   Status: Acute   


Qualifiers: 


   Sepsis type: Streptococcus group B   Qualified Code(s): A40.1 - Sepsis due 

to streptococcus, group B   


Comment: resolving   





(4) Physical deconditioning


Code(s): R53.81 - OTHER MALAISE   Status: Acute   Comment: severe   





(5) Acute renal failure


Status: Acute   


Qualifiers: 


   Acute renal failure type: with acute tubular necrosis   Qualified Code(s): 

N17.0 - Acute kidney failure with tubular necrosis   


Comment: HD held to see if he responds   





(6) CAD (coronary artery disease)


Code(s): I25.10 - ATHSCL HEART DISEASE OF NATIVE CORONARY ARTERY W/O ANG PCTRS 

  Status: Chronic   


Qualifiers: 


   Coronary Disease-Associated Artery/Lesion type: native artery   Native vs. 

transplanted heart: native heart   Associated angina: without angina   

Qualified Code(s): I25.10 - Atherosclerotic heart disease of native coronary 

artery without angina pectoris   


Comment: s/p cath with bare metal stent to rca   





(7) CHF (congestive heart failure)


Code(s): I50.9 - HEART FAILURE, UNSPECIFIED   Status: Acute   


Qualifiers: 


   Heart failure type: combined systolic and diastolic 


Comment: ef of 45%   





(8) DM type 2 (diabetes mellitus, type 2)


Status: Chronic   


Qualifiers: 


   Diabetes mellitus long term insulin use: with long term use   Diabetes 

mellitus complication status: with kidney complications   Diabetes mellitus 

complication detail: with chronic kidney disease   Chronic kidney disease stage

: on chronic dialysis   Qualified Code(s): E11.22 - Type 2 diabetes mellitus 

with diabetic chronic kidney disease; N18.6 - End stage renal disease; Z79.4 - 

Long term (current) use of insulin; Z99.2 - Dependence on renal dialysis   





(9) HTN (hypertension)


Code(s): I10 - ESSENTIAL (PRIMARY) HYPERTENSION   Status: Chronic   


Qualifiers: 


   Hypertension type: essential hypertension   Qualified Code(s): I10 - 

Essential (primary) hypertension   





(10) Cellulitis and abscess of left leg


Code(s): L03.116 - CELLULITIS OF LEFT LOWER LIMB; L02.416 - CUTANEOUS ABSCESS 

OF LEFT LOWER LIMB   Status: Acute   Comment: resolving.   





(11) Group A streptococcal infection


Code(s): B95.0 - STREPTOCOCCUS, GROUP A, CAUSING DISEASES CLASSD ELSWHR   Status

: Acute   





- Plan


is on vanc, meropenem and fluconazole


-: asp, plavix, crestor, coreg, amiodarone 400mg bid


-: risperdal HS prn


-: peg feeding, speech to eval if he can swallow


-: dc planning





* .








Review of Systems





- Medications/Allergies


Allergies/Adverse Reactions: 


 Allergies











Allergy/AdvReac Type Severity Reaction Status Date / Time


 


morphine Allergy  Anaphylaxis Verified 01/07/19 03:57











Medications: 


 Current Medications





Acetaminophen (Tylenol)  1,000 mg PO Q6H PRN


   PRN Reason: Mild Pain (1-3)


   Last Admin: 01/27/19 16:37 Dose:  1,000 mg


Albuterol/Ipratropium (Duoneb)  3 ml NEB [id number]


   Last Admin: 01/30/19 06:33 Dose:  3 ml


Amiodarone HCl (Cordarone)  400 mg PER TUBE BID [facility]


   Last Admin: 01/30/19 08:23 Dose:  400 mg


Aspirin (Aspirin Chewable)  81 mg PO DAILY [facility]


   Last Admin: 01/30/19 08:23 Dose:  81 mg


Bisacodyl (Dulcolax)  10 mg [place] DAILY PRN


   PRN Reason: Constipation


   Last Admin: 01/13/19 09:44 Dose:  10 mg


Carvedilol (Coreg)  12.5 mg PER TUBE BID-[facility]


   Last Admin: 01/30/19 07:59 Dose:  12.5 mg


Clopidogrel Bisulfate (Plavix)  75 mg PO DAILY [facility]


   Last Admin: 01/30/19 08:23 Dose:  75 mg


Dextrose/Water (Dextrose 50%)  25 gm SLOW IVP PRN PRN


   PRN Reason: Hypoglycemia


Glucagon (Glucagon)  1 mg IM PRN PRN


   PRN Reason: Hypoglycemia


Norepinephrine Bitartrate (Levophed)  250 mls @ 0 mls/hr IVPB INF [facility]; Protocol


   Last Admin: 01/10/19 14:43 Dose:  250 mls


Vasopressin 40 unit/Miscellaneous Medication 1 each/ Sodium Chloride  102 mls @ 

0 mls/hr IV INF PRN; Protocol


   PRN Reason: SBP>90


Dextrose/Water (D5w)  1,000 mls @ 0 mls/hr IV .Q0M PRN


   PRN Reason: Hypoglycemia


Fluconazole/Sodium Chloride (200 mg/ Device)  100 mls @ 100 mls/hr IVPB 1800 [facility]


   Last Admin: 01/29/19 18:00 Dose:  100 mls


Meropenem 1 gm/ Device  50 mls @ 100 mls/hr IVPB 0430,1630 [facility]


   Last Admin: 01/30/19 05:32 Dose:  50 mls


Sodium Chloride (Normal Saline 0.9%)  1,000 mls @ 50 mls/hr IV .Q20H [facility]


   Last Admin: 01/29/19 10:44 Dose:  1,000 mls


Vancomycin HCl 1.25 gm/ Sodium (Chloride)  250 mls @ 166.667 mls/hr IVPB Q24HR 

[facility]


   Last Admin: 01/29/19 10:49 Dose:  250 mls


Insulin Human Lispro (Humalog)  0 units SC .MODERATE SLIDING SC PRN


   PRN Reason: Moderate Correctional Scale


   Last Admin: 01/30/19 07:29 Dose:  2 unit


Insulin Human Regular (Humulin R)  0 units SC .MODERATE SLIDING SC PRN; Protocol


   PRN Reason: MODERATE SLIDING SCALE


   Last Admin: 01/17/19 17:19 Dose:  2 units


Laxative/Stool Softener (Laxative Of Choice)  1 each PO DAILY PRN


   PRN Reason: Constipation


Magnesium Hydroxide (Milk Of Magnesium)  30 ml PO DAILY PRN


   PRN Reason: Constipation


   Last Admin: 01/12/19 08:32 Dose:  30 ml


Nitroglycerin (Nitrostat)  0.4 mg SL Q5MIN PRN


   PRN Reason: Chest Pain


Hold Vancomycin For (Level >20)  0 each FS .AT DIALYSIS [facility]


Ondansetron HCl (Zofran Odt)  4 mg PO Q6H PRN


   PRN Reason: Nausea/Vomiting


Ondansetron HCl (Zofran)  4 mg IVP Q6H PRN


   PRN Reason: Nausea/Vomiting


Pantoprazole Sodium (Protonix)  40 mg IVP Q12HR [facility]


   Last Admin: 01/30/19 08:23 Dose:  40 mg


Risperidone (Risperidone)  0.25 mg PO [facility]


   Stop: 01/30/19 21:01


Rosuvastatin Calcium (Crestor)  10 mg PO [facility]


   Last Admin: 01/29/19 20:48 Dose:  10 mg


Silver Sulfadiazine (Silvadene)  0 gm TOP BID [facility]


   Last Admin: 01/30/19 08:24 Dose:  1 applic


Sodium Chloride (Flush - Normal Saline)  10 ml IVF Q12HR [facility]


   Last Admin: 01/30/19 08:23 Dose:  10 ml


Sodium Chloride (Flush - Normal Saline)  10 ml IVF PRN PRN


   PRN Reason: Saline Flush


   Last Admin: 01/23/19 09:52 Dose:  10 ml


Sodium Chloride (Flush - Normal Saline)  10 ml IV Q12HR [facility]


   Last Admin: 01/30/19 08:24 Dose:  10 ml

## 2019-01-30 NOTE — PRG
DATE OF SERVICE:  01/30/2019



SUBJECTIVE:  Mr. Bojorquez is a 68-year-old white male, who was admitted for septic

shock.  He received a course of IV antibiotics.  He was also seen by the Renal

Service for his acute kidney injury secondary to a presumed ATN.  His dialysis was

initiated due to the volume overload.  His renal function is much improved.  He is

diuresing well.  We have stopped dialysis since last Saturday.  My plan is to have

the temporary femoral dialysis catheter pulled out.  No other complaints. 



OBJECTIVE:  VITAL SIGNS:  Blood pressure 166/82, heart rate 86, respiratory rate 16,

pulse ox 98%. 

GENERAL:  The patient is awake, alert, comfortable, not in distress.  Positive for a

trach collar. 

LUNGS:  Clear breath sounds.  No wheezing.  No crackles. 

HEART:  Normal sinus rhythm.  No murmur.  No gallops or rubs. 

ABDOMEN:  Globular, soft, nontender.  No masses. 

EXTREMITIES:  No edema.  No deformities.  Please note, there is a positive PEG tube.



MEDICATIONS:  Medications of 01/30/2019 reviewed.



LABORATORY DATA:  Laboratories of 01/29/2019; white count is 8, hemoglobin 8.3.

Sodium 143, potassium 3.2, chloride 111, carbon dioxide 23, BUN 29, creatinine 1.41,

glucose 173, calcium 8.5.  We are awaiting results of his basic met for 01/30. 



ASSESSMENT AND PLAN:  

1. Acute kidney injury-secondary to ischemic acute tubular necrosis, much improved.

The dialysis has been discontinued.  We will be requesting the nursing staff to pull

out that temporary femoral dialysis catheter. 

2. Status post sepsis, much improved.

3. Anemia, p.r.n. blood transfusion. 

Overall, I agree with current management.







Job ID:  [id number]

## 2019-01-30 NOTE — PRG
DATE OF SERVICE:  01/30/2019



SUBJECTIVE:  Maryellen is much more awake, responsive, less agitated.



OBJECTIVE:  VITAL SIGNS:  Sats 100% trach collar, temperature is 98, pulse 80,

respiratory rate 16, blood pressure 165/86. 

GENERAL:  Making good urine output. 

CHEST:  Decreased breath sounds.  No wheezing. 

CARDIAC:  Normal S1 and S2.  No gallops. 

ABDOMEN:  No masses.



IMPRESSION:  

1. Multiorgan failure, sepsis syndrome, much improved.

2. Encephalopathy, much improved.

3. Severe deconditioning.

4. Diabetes.



PLAN:  Continue antibiotics, eventually Infectious Disease.  Aggressive PT,

eventually placement. 



We will try to downsize the medication. 



One-half hour of critical time.







Job ID:  [id number]

## 2019-01-31 RX ADMIN — INSULIN LISPRO PRN UNIT: 100 INJECTION, SOLUTION INTRAVENOUS; SUBCUTANEOUS at 16:17

## 2019-01-31 RX ADMIN — ASPIRIN 81 MG CHEWABLE TABLET SCH MG: 81 TABLET CHEWABLE at 08:26

## 2019-01-31 RX ADMIN — MEROPENEM AND SODIUM CHLORIDE SCH MLS: 1 INJECTION, SOLUTION INTRAVENOUS at 04:45

## 2019-01-31 RX ADMIN — Medication SCH ML: at 08:27

## 2019-01-31 RX ADMIN — SILVER SULFADIAZINE SCH APPLIC: 10 CREAM TOPICAL at 20:31

## 2019-01-31 RX ADMIN — Medication SCH ML: at 20:33

## 2019-01-31 RX ADMIN — SILVER SULFADIAZINE SCH APPLIC: 10 CREAM TOPICAL at 08:27

## 2019-01-31 RX ADMIN — FLUCONAZOLE SCH MLS: 2 INJECTION, SOLUTION INTRAVENOUS at 18:42

## 2019-01-31 RX ADMIN — POTASSIUM BICARBONATE SCH MEQ: 977.5 TABLET, EFFERVESCENT ORAL at 09:30

## 2019-01-31 RX ADMIN — VANCOMYCIN HYDROCHLORIDE SCH MLS: 1 INJECTION, POWDER, LYOPHILIZED, FOR SOLUTION INTRAVENOUS at 11:06

## 2019-01-31 RX ADMIN — MEROPENEM AND SODIUM CHLORIDE SCH MLS: 1 INJECTION, SOLUTION INTRAVENOUS at 15:36

## 2019-01-31 RX ADMIN — INSULIN LISPRO PRN UNIT: 100 INJECTION, SOLUTION INTRAVENOUS; SUBCUTANEOUS at 11:55

## 2019-01-31 NOTE — PRG
DATE OF SERVICE:  



SUBJECTIVE:  Danial Bojorquez this morning is awake, alert, and responsive.  
He is

doing better.  He is on T-piece.  He has had a trach for day #9.  It probably 
needs

to be downsized next week ,avoiding this week fresh trach. 



OBJECTIVE:  VITAL SIGNS:  Blood pressure is 180/90, pulse 80, saturations 98%,

respiratory rate 18. 

CHEST:  Decreased breath sounds.  Minimal rhonchi. 

CARDIAC:  Normal S1 and S2.  No gallops. 

ABDOMEN:  No masses.



IMPRESSION:  

1. Status post multiorgan failure secondary to left leg cellulitis, felt to be

Streptococcus. 

2. Renal failure, resolved.

3. Respiratory failure, much improved.

4. Major encephalopathy, improved.

5. ARDS resolved.

6. Supraventricular tachycardia.

7. Diabetes, uncontrolled.

8. Severe deconditioning.



PLAN:  Plan is to transfer to LTAC.  Antibiotics as per Infectious Disease.

Probably can downsize the trach next week.  He has a speaking valve in place.

Otherwise, supportive care, PT. 



One-half hour of critical time.







Job ID:  [id number]



[facility]

## 2019-01-31 NOTE — PDOC.PN
- Subjective


Encounter Start Date: 01/31/19


Encounter Start Time: 10:45


Subjective: awake, on trach collar


-: follows verbal stimuli


-: is moving all extremities





- Objective


Resuscitation Status - Order Detail:





01/07/19 04:33


Resuscitation Status Routine 


   Resuscitation Status: FULL: Full Resuscitation








MAR Reviewed: Yes


Vital Signs & Weight: 


 Vital Signs (12 hours)











  Temp Pulse Pulse Pulse Pulse Resp BP


 


 01/31/19 08:33    82  85  82  


 


 01/31/19 08:25        184/94 H


 


 01/31/19 07:28   76     16 


 


 01/31/19 07:07       


 


 01/31/19 07:00  98.6 F      


 


 01/31/19 04:00  98.0 F      


 


 01/31/19 00:46   76     25 H 


 


 01/31/19 00:00  97.9 F      














  BP BP BP Pulse Ox Pulse Ox Pulse Ox


 


 01/31/19 08:33  162/82 H  182/91 H  183/89 H   100  100


 


 01/31/19 08:25      


 


 01/31/19 07:28     100  


 


 01/31/19 07:07     97  


 


 01/31/19 07:00      


 


 01/31/19 04:00      


 


 01/31/19 00:46     100  


 


 01/31/19 00:00      








 Weight











Admit Weight                   249 lb


 


Weight                         249 lb 1.957 oz











 Most Recent Monitor Data











Heart Rate from ECG            73


 


NIBP                           151/80


 


NIBP BP-Mean                   103


 


Respiration from ECG           16


 


SpO2                           100














I&O: 


 











 01/30/19 01/31/19 02/01/19





 06:59 06:59 06:59


 


Intake Total 2412 2739 180


 


Output Total 1965 1800 460


 


Balance 447 939 -280











Result Diagrams: 


 01/29/19 04:17





 01/30/19 09:47


Additional Labs: 


 Accuchecks











  01/31/19 01/30/19





  06:06 17:12


 


POC Glucose  181 H  194 H














Phys Exam





- Physical Examination


HEENT: PERRLA, sclera anicteric


Neck: no JVD


trach+


Respiratory: no wheezing, no rales


Cardiovascular: RRR, no significant murmur


Gastrointestinal: soft, non-tender, positive bowel sounds


peg+


Musculoskeletal: pulses present


edema is receding well


Neurological: non-focal, moves all 4 limbs





Dx/Plan


(1) Acute respiratory failure with hypoxia


Code(s): J96.01 - ACUTE RESPIRATORY FAILURE WITH HYPOXIA   Status: Acute   

Comment: s/p trach   





(2) Toxic metabolic encephalopathy


Code(s): G92 - TOXIC ENCEPHALOPATHY   Status: Resolved   





(3) Sepsis


Code(s): A41.9 - SEPSIS, UNSPECIFIED ORGANISM   Status: Acute   


Qualifiers: 


   Sepsis type: Streptococcus group B   Qualified Code(s): A40.1 - Sepsis due 

to streptococcus, group B   


Comment: resolving   





(4) Physical deconditioning


Code(s): R53.81 - OTHER MALAISE   Status: Acute   Comment: severe   





(5) Acute renal failure


Status: Acute   


Qualifiers: 


   Acute renal failure type: with acute tubular necrosis   Qualified Code(s): 

N17.0 - Acute kidney failure with tubular necrosis   


Comment: HD held to see if he responds   





(6) CAD (coronary artery disease)


Code(s): I25.10 - ATHSCL HEART DISEASE OF NATIVE CORONARY ARTERY W/O ANG PCTRS 

  Status: Chronic   


Qualifiers: 


   Coronary Disease-Associated Artery/Lesion type: native artery   Native vs. 

transplanted heart: native heart   Associated angina: without angina   

Qualified Code(s): I25.10 - Atherosclerotic heart disease of native coronary 

artery without angina pectoris   


Comment: s/p cath with bare metal stent to rca   





(7) CHF (congestive heart failure)


Code(s): I50.9 - HEART FAILURE, UNSPECIFIED   Status: Acute   


Qualifiers: 


   Heart failure type: combined systolic and diastolic 


Comment: ef of 45%   





(8) DM type 2 (diabetes mellitus, type 2)


Status: Chronic   


Qualifiers: 


   Diabetes mellitus long term insulin use: with long term use   Diabetes 

mellitus complication status: with kidney complications   Diabetes mellitus 

complication detail: with chronic kidney disease   Chronic kidney disease stage

: on chronic dialysis   Qualified Code(s): E11.22 - Type 2 diabetes mellitus 

with diabetic chronic kidney disease; N18.6 - End stage renal disease; Z79.4 - 

Long term (current) use of insulin; Z99.2 - Dependence on renal dialysis   





(9) HTN (hypertension)


Code(s): I10 - ESSENTIAL (PRIMARY) HYPERTENSION   Status: Chronic   


Qualifiers: 


   Hypertension type: essential hypertension   Qualified Code(s): I10 - 

Essential (primary) hypertension   





(10) Cellulitis and abscess of left leg


Code(s): L03.116 - CELLULITIS OF LEFT LOWER LIMB; L02.416 - CUTANEOUS ABSCESS 

OF LEFT LOWER LIMB   Status: Acute   Comment: resolving.   





(11) Group A streptococcal infection


Code(s): B95.0 - STREPTOCOCCUS, GROUP A, CAUSING DISEASES CLASSD ELSWHR   Status

: Acute   





- Plan


is on meropenem, vanc and diflucan


-: nebs, asp, plavix, amiodarone, coreg


-: speech to clear for swallowing, peg feeding


-: awaiting ltac placement


-: has speaking valve, change of trach size in 1 week





* .








Review of Systems





- Medications/Allergies


Allergies/Adverse Reactions: 


 Allergies











Allergy/AdvReac Type Severity Reaction Status Date / Time


 


morphine Allergy  Anaphylaxis Verified 01/07/19 03:57











Medications: 


 Current Medications





Acetaminophen (Tylenol)  1,000 mg PO Q6H PRN


   PRN Reason: Mild Pain (1-3)


   Last Admin: 01/27/19 16:37 Dose:  1,000 mg


Albuterol/Ipratropium (Duoneb)  3 ml NEB [id number] [facility]


   Last Admin: 01/31/19 07:28 Dose:  3 ml


Amiodarone HCl (Cordarone)  400 mg PER TUBE BID [facility]


   Last Admin: 01/31/19 08:25 Dose:  400 mg


Aspirin (Aspirin Chewable)  81 mg PO DAILY [facility]


   Last Admin: 01/31/19 08:26 Dose:  81 mg


Bisacodyl (Dulcolax)  10 mg [place] DAILY PRN


   PRN Reason: Constipation


   Last Admin: 01/13/19 09:44 Dose:  10 mg


Carvedilol (Coreg)  12.5 mg PER TUBE BID-[facility]


   Last Admin: 01/31/19 08:25 Dose:  12.5 mg


Clopidogrel Bisulfate (Plavix)  75 mg PO DAILY [facility]


   Last Admin: 01/31/19 08:26 Dose:  75 mg


Dextrose/Water (Dextrose 50%)  25 gm SLOW IVP PRN PRN


   PRN Reason: Hypoglycemia


Glucagon (Glucagon)  1 mg IM PRN PRN


   PRN Reason: Hypoglycemia


Norepinephrine Bitartrate (Levophed)  250 mls @ 0 mls/hr IVPB INF [facility]; Protocol


   Last Admin: 01/10/19 14:43 Dose:  250 mls


Dextrose/Water (D5w)  1,000 mls @ 0 mls/hr IV .Q0M PRN


   PRN Reason: Hypoglycemia


Fluconazole/Sodium Chloride (200 mg/ Device)  100 mls @ 100 mls/hr IVPB 1800 [facility]


   Last Admin: 01/30/19 17:28 Dose:  100 mls


Meropenem 1 gm/ Device  50 mls @ 100 mls/hr IVPB 0430,1630 [facility]


   Last Admin: 01/31/19 04:45 Dose:  50 mls


Sodium Chloride (Normal Saline 0.9%)  1,000 mls @ 50 mls/hr IV .Q20H [facility]


   Last Admin: 01/30/19 20:57 Dose:  1,000 mls


Vancomycin HCl 1.25 gm/ Sodium (Chloride)  250 mls @ 166.667 mls/hr IVPB 1100 

[facility]


   Last Admin: 01/31/19 11:06 Dose:  250 mls


Insulin Human Lispro (Humalog)  0 units SC .MODERATE SLIDING SC PRN


   PRN Reason: Moderate Correctional Scale


   Last Admin: 01/31/19 11:55 Dose:  2 unit


Insulin Human Regular (Humulin R)  0 units SC .MODERATE SLIDING SC PRN; Protocol


   PRN Reason: MODERATE SLIDING SCALE


   Last Admin: 01/17/19 17:19 Dose:  2 units


Laxative/Stool Softener (Laxative Of Choice)  1 each PO DAILY PRN


   PRN Reason: Constipation


Magnesium Hydroxide (Milk Of Magnesium)  30 ml PO DAILY PRN


   PRN Reason: Constipation


   Last Admin: 01/12/19 08:32 Dose:  30 ml


Nitroglycerin (Nitrostat)  0.4 mg SL Q5MIN PRN


   PRN Reason: Chest Pain


Hold Vancomycin For (Level >20)  0 each FS .AT DIALYSIS [facility]


Ondansetron HCl (Zofran Odt)  4 mg PO Q6H PRN


   PRN Reason: Nausea/Vomiting


Ondansetron HCl (Zofran)  4 mg IVP Q6H PRN


   PRN Reason: Nausea/Vomiting


Pantoprazole Sodium (Protonix)  40 mg PO DAILY [facility]


   Last Admin: 01/31/19 08:26 Dose:  40 mg


Potassium Bicarb/Potassium Chloride (K-Lyte Cl)  25 meq PO QAM-WM [facility]


   Stop: 02/04/19 08:01


   Last Admin: 01/31/19 09:30 Dose:  25 meq


Rosuvastatin Calcium (Crestor)  10 mg PO HS [facility]


   Last Admin: 01/30/19 20:53 Dose:  10 mg


Silver Sulfadiazine (Silvadene)  0 gm TOP BID [facility]


   Last Admin: 01/31/19 08:27 Dose:  1 applic


Sodium Chloride (Flush - Normal Saline)  10 ml IVF Q12HR [facility]


   Last Admin: 01/31/19 08:27 Dose:  10 ml


Sodium Chloride (Flush - Normal Saline)  10 ml IVF PRN PRN


   PRN Reason: Saline Flush


   Last Admin: 01/23/19 09:52 Dose:  10 ml

## 2019-01-31 NOTE — PRG
DATE OF SERVICE:  01/31/2019



SUBJECTIVE:  Mr. Bojorquez is a 68-year-old white male, who was admitted for septic

shock and seen by the Renal Service for his acute kidney injury secondary to a

possible ischemic ATN.  He was placed temporarily on dialysis.  The dialysis has

been discontinued due to the improved renal function.  The right femoral dialysis

catheter has been pulled out.  No other complaints today. 



OBJECTIVE:  VITAL SIGNS:  Blood pressure 162/82, heart rate 82, respiratory rate 26,

pulse ox 100%. 

GENERAL:  Noted to be awake, supine, comfortable, not in distress. 

SKIN:  Adequate turgor. 

HEENT:  He has slightly pale conjunctivae.  Anicteric sclerae. 

NECK:  No neck mass.  No carotid bruits.  No JVD.  Positive for tracheostomy. 

LUNGS:  Decreased breath sounds. 

HEART:  Normal sinus rhythm.  No murmurs, gallops, or rubs. 

ABDOMEN:  Globular, soft, nontender.  No masses. 

EXTREMITIES:  Trace edema.



MEDICATIONS:  Medications of January 31, 2019 was reviewed.



LABORATORY DATA:  Laboratories of January 31, 2019 shows the following; white count

8, hemoglobin 8.3.  On January 30, 2019; sodium 146, potassium 3.3, chloride 113,

carbon dioxide 25, BUN 31, creatinine 1.38, glucose 166, calcium 8.7. 



ASSESSMENT AND PLAN:  

1. Acute kidney injury - secondary to ischemic ATN - resolved.  The dialysis has

been discontinued.  In addition, right femoral dialysis catheter has been pulled

out. 

2. Hypernatremia/hypokalemia.  We will recheck the patient's chemistries tomorrow.

3. Status post septic shock, much improved.

4. Overall, I agree with current management.







Job ID:  [id number]

## 2019-02-01 VITALS — SYSTOLIC BLOOD PRESSURE: 164 MMHG | DIASTOLIC BLOOD PRESSURE: 83 MMHG

## 2019-02-01 VITALS — TEMPERATURE: 98.8 F

## 2019-02-01 LAB
ANION GAP SERPL CALC-SCNC: 12 MMOL/L (ref 10–20)
BASOPHILS # BLD AUTO: 0.1 THOU/UL (ref 0–0.2)
BASOPHILS NFR BLD AUTO: 1.3 % (ref 0–1)
BUN SERPL-MCNC: 33 MG/DL (ref 8.4–25.7)
CALCIUM SERPL-MCNC: 8.7 MG/DL (ref 7.8–10.44)
CHLORIDE SERPL-SCNC: 115 MMOL/L (ref 98–107)
CO2 SERPL-SCNC: 24 MMOL/L (ref 23–31)
CREAT CL PREDICTED SERPL C-G-VRATE: 87 ML/MIN (ref 70–130)
EOSINOPHIL # BLD AUTO: 0.4 THOU/UL (ref 0–0.7)
EOSINOPHIL NFR BLD AUTO: 5.4 % (ref 0–10)
GLUCOSE SERPL-MCNC: 184 MG/DL (ref 80–115)
HGB BLD-MCNC: 7.3 G/DL (ref 14–18)
LYMPHOCYTES # BLD: 1.8 THOU/UL (ref 1.2–3.4)
LYMPHOCYTES NFR BLD AUTO: 22.4 % (ref 21–51)
MCH RBC QN AUTO: 30.2 PG (ref 27–31)
MCV RBC AUTO: 94.1 FL (ref 78–98)
MONOCYTES # BLD AUTO: 0.5 THOU/UL (ref 0.11–0.59)
MONOCYTES NFR BLD AUTO: 5.6 % (ref 0–10)
NEUTROPHILS # BLD AUTO: 5.4 THOU/UL (ref 1.4–6.5)
NEUTROPHILS NFR BLD AUTO: 65.3 % (ref 42–75)
PLATELET # BLD AUTO: 252 THOU/UL (ref 130–400)
POTASSIUM SERPL-SCNC: 3.2 MMOL/L (ref 3.5–5.1)
RBC # BLD AUTO: 2.42 MILL/UL (ref 4.7–6.1)
SODIUM SERPL-SCNC: 148 MMOL/L (ref 136–145)
VANCOMYCIN TROUGH SERPL-MCNC: 14.5 UG/ML
WBC # BLD AUTO: 8.2 THOU/UL (ref 4.8–10.8)

## 2019-02-01 RX ADMIN — SILVER SULFADIAZINE SCH APPLIC: 10 CREAM TOPICAL at 08:38

## 2019-02-01 RX ADMIN — POTASSIUM BICARBONATE SCH MEQ: 977.5 TABLET, EFFERVESCENT ORAL at 07:53

## 2019-02-01 RX ADMIN — VANCOMYCIN HYDROCHLORIDE SCH MLS: 1 INJECTION, POWDER, LYOPHILIZED, FOR SOLUTION INTRAVENOUS at 11:56

## 2019-02-01 RX ADMIN — ASPIRIN 81 MG CHEWABLE TABLET SCH MG: 81 TABLET CHEWABLE at 08:38

## 2019-02-01 RX ADMIN — Medication SCH ML: at 08:37

## 2019-02-01 RX ADMIN — INSULIN LISPRO PRN UNIT: 100 INJECTION, SOLUTION INTRAVENOUS; SUBCUTANEOUS at 10:04

## 2019-02-01 RX ADMIN — INSULIN LISPRO PRN UNIT: 100 INJECTION, SOLUTION INTRAVENOUS; SUBCUTANEOUS at 05:02

## 2019-02-01 RX ADMIN — MEROPENEM AND SODIUM CHLORIDE SCH MLS: 1 INJECTION, SOLUTION INTRAVENOUS at 05:01

## 2019-02-01 NOTE — PRG
DATE OF SERVICE:  02/01/2019



SUBJECTIVE:  This morning, he is awake, alert, responsive.  Pulse 78, sats 98%,

respiratory rate 21, blood pressure 130/80.  Denies any respiratory distress.  His

#8 cuffed trach was replaced with a #6 cuffless trach without any problems.  He has

a speaking valve to which he is able to tolerate and talk.  Blood pressure 167/83

thereafter. 



OBJECTIVE:  CHEST:  Decreased breath sounds.  No wheezing. 

CARDIAC:  Normal S1, S2.  No gallops. 

ABDOMEN:  Soft.



LABORATORY DATA:  Creatinine 1.3, potassium 3.2.  Lytes are normal.  H and H 7.3 and

22. 



ASSESSMENT AND PLAN:  Respiratory failure, multiorgan failure, renal failure,

encephalopathy, all resolved. 



He will be transferred to a long-term facility where aggressive PT supportive care. 



His #6 trach can be downsized to #4 and thereafter discontinue. 



One-half hour of critical time.







Job ID:  [id number]

## 2019-02-01 NOTE — DIS
DATE OF ADMISSION:  01/07/2019



DATE OF DISCHARGE:  02/01/2019



ADMITTING DIAGNOSES:  

1. Septic shock.

2. Diabetic ketoacidosis.

3. Left groin abscess.

4. Diabetes mellitus.

5. Acute kidney injury.



DISCHARGE DIAGNOSES:  

1. Septic shock.

2. Diabetic ketoacidosis.

3. Left groin abscess.

4. Diabetes mellitus.

5. Acute kidney injury.

6. Left leg cellulitis.



CONSULTANTS:  

1. Dr. Corral.

2. Dr. Fournier.

3. Dr. Mazariegos.

4. Dr. Fuller.

5. Dr. Malin.



PROCEDURES:  Chest x-ray, placement of femoral dialysis catheter, dialysis therapy,

echocardiogram, abdomen and pelvis CT, and I and D of abscess. 



COURSE OF HOSPITALIZATION:  Hospitalization was prolonged.  The patient has severe

deconditioning at this time during transfer to [facility] for IV antibiotic therapy and

possible physical therapy. 







Job ID:  [id number]

## 2019-02-01 NOTE — PDOC.PN
- Subjective


Encounter Start Date: 02/01/19


Encounter Start Time: 08:50


Subjective: No specific complaint





- Objective


Resuscitation Status - Order Detail:





01/07/19 04:33


Resuscitation Status Routine 


   Resuscitation Status: FULL: Full Resuscitation








Vital Signs & Weight: 


 Vital Signs (12 hours)











  Temp Pulse Resp BP Pulse Ox


 


 02/01/19 07:53     167/83 H 


 


 02/01/19 07:25   79  23 H  


 


 02/01/19 07:08      97


 


 02/01/19 07:00  98.6 F    


 


 02/01/19 04:00  97.6 F    


 


 01/31/19 23:22   65  20   100








 Weight











Admit Weight                   249 lb


 


Weight                         249 lb 1.957 oz











 Most Recent Monitor Data











Heart Rate from ECG            73


 


NIBP                           160/79


 


NIBP BP-Mean                   106


 


Respiration from ECG           19


 


SpO2                           100














I&O: 


 











 01/31/19 02/01/19 02/02/19





 06:59 06:59 06:59


 


Intake Total 2739 2462 180


 


Output Total 1800 1265 250


 


Balance 939 1197 -70











Result Diagrams: 


 02/01/19 05:05





 02/01/19 05:05


Additional Labs: 


 Accuchecks











  02/01/19 02/01/19 01/31/19





  10:01 05:01 16:17


 


POC Glucose  165 H  180 H  163 H














  01/31/19





  11:51


 


POC Glucose  164 H














Phys Exam





- Physical Examination


Neck: no JVD


Respiratory: clear to auscultation bilateral


Cardiovascular: irregular


Gastrointestinal: soft


Musculoskeletal: edema present (left ashley abscess,left leg cellulitis..)





Dx/Plan


(1) Abscess of left thigh


Code(s): L02.416 - CUTANEOUS ABSCESS OF LEFT LOWER LIMB   Status: Acute   





(2) Acute renal failure


Status: Acute   


Qualifiers: 


   Acute renal failure type: with acute tubular necrosis   Qualified Code(s): 

N17.0 - Acute kidney failure with tubular necrosis   


Comment: HD held to see if he responds   





(3) Acute respiratory failure with hypoxia


Code(s): J96.01 - ACUTE RESPIRATORY FAILURE WITH HYPOXIA   Status: Acute   

Comment: s/p trach   





(4) Cellulitis and abscess of left leg


Code(s): L03.116 - CELLULITIS OF LEFT LOWER LIMB; L02.416 - CUTANEOUS ABSCESS 

OF LEFT LOWER LIMB   Status: Acute   Comment: resolving.   





(5) Diastolic CHF


Code(s): I50.30 - UNSPECIFIED DIASTOLIC (CONGESTIVE) HEART FAILURE   Status: 

Acute   





(6) Group A streptococcal infection


Code(s): B95.0 - STREPTOCOCCUS, GROUP A, CAUSING DISEASES CLASSD ELSWHR   Status

: Acute   





- Plan


-: Being transfered to [facility].





* .

## 2022-02-25 NOTE — PRG
DATE OF SERVICE:  01/25/2019



Danial Bojorquez continues to make slow progress.  He was down for cardiac

catheterization today. 



He remains mechanically ventilated.  Once he is back from cardiac catheterization,

he can be placed back on a trach collar during the day and continue with nocturnal

ventilation. 







Job ID:  [id number] All other review of systems negative, except as noted in HPI